# Patient Record
Sex: MALE | Race: WHITE | NOT HISPANIC OR LATINO | Employment: OTHER | URBAN - METROPOLITAN AREA
[De-identification: names, ages, dates, MRNs, and addresses within clinical notes are randomized per-mention and may not be internally consistent; named-entity substitution may affect disease eponyms.]

---

## 2018-08-30 ENCOUNTER — OFFICE VISIT (OUTPATIENT)
Dept: FAMILY MEDICINE CLINIC | Facility: CLINIC | Age: 83
End: 2018-08-30
Payer: MEDICARE

## 2018-08-30 VITALS
BODY MASS INDEX: 25.91 KG/M2 | DIASTOLIC BLOOD PRESSURE: 60 MMHG | SYSTOLIC BLOOD PRESSURE: 114 MMHG | WEIGHT: 171 LBS | HEART RATE: 68 BPM | HEIGHT: 68 IN | RESPIRATION RATE: 16 BRPM | TEMPERATURE: 96.2 F

## 2018-08-30 DIAGNOSIS — I25.10 CORONARY ARTERY DISEASE INVOLVING NATIVE CORONARY ARTERY OF NATIVE HEART WITHOUT ANGINA PECTORIS: ICD-10-CM

## 2018-08-30 DIAGNOSIS — I48.0 PAROXYSMAL ATRIAL FIBRILLATION (HCC): ICD-10-CM

## 2018-08-30 DIAGNOSIS — E11.9 TYPE 2 DIABETES MELLITUS WITH HEMOGLOBIN A1C GOAL OF LESS THAN 7.0% (HCC): Primary | ICD-10-CM

## 2018-08-30 DIAGNOSIS — I10 ESSENTIAL HYPERTENSION: ICD-10-CM

## 2018-08-30 DIAGNOSIS — R68.89 COLD INTOLERANCE: ICD-10-CM

## 2018-08-30 DIAGNOSIS — I44.2 CHB (COMPLETE HEART BLOCK) (HCC): ICD-10-CM

## 2018-08-30 DIAGNOSIS — M06.9 RHEUMATOID ARTHRITIS, INVOLVING UNSPECIFIED SITE, UNSPECIFIED RHEUMATOID FACTOR PRESENCE: ICD-10-CM

## 2018-08-30 DIAGNOSIS — Z95.2 S/P AVR (AORTIC VALVE REPLACEMENT): ICD-10-CM

## 2018-08-30 PROBLEM — Z87.891 FORMER SMOKER: Status: ACTIVE | Noted: 2017-02-26

## 2018-08-30 PROBLEM — Z95.0 PACEMAKER: Status: ACTIVE | Noted: 2018-08-30

## 2018-08-30 PROBLEM — M72.0 DUPUYTREN CONTRACTURE: Status: ACTIVE | Noted: 2018-04-10

## 2018-08-30 PROBLEM — H90.3 BILATERAL SENSORINEURAL HEARING LOSS: Status: ACTIVE | Noted: 2017-02-26

## 2018-08-30 PROCEDURE — 99204 OFFICE O/P NEW MOD 45 MIN: CPT | Performed by: NURSE PRACTITIONER

## 2018-08-30 RX ORDER — BLOOD-GLUCOSE METER
EACH MISCELLANEOUS
COMMUNITY
Start: 2018-08-27 | End: 2018-11-13 | Stop reason: SDUPTHER

## 2018-08-30 RX ORDER — FLUTICASONE PROPIONATE 50 MCG
1 SPRAY, SUSPENSION (ML) NASAL DAILY
COMMUNITY
End: 2019-04-29

## 2018-08-30 RX ORDER — BENAZEPRIL HYDROCHLORIDE 5 MG/1
TABLET, FILM COATED ORAL
COMMUNITY
Start: 2018-02-15 | End: 2018-11-13 | Stop reason: SDUPTHER

## 2018-08-30 RX ORDER — HYDROXYCHLOROQUINE SULFATE 200 MG/1
TABLET, FILM COATED ORAL
COMMUNITY
Start: 2018-04-10 | End: 2018-09-11

## 2018-08-30 RX ORDER — LANCETS 33 GAUGE
EACH MISCELLANEOUS
COMMUNITY
Start: 2018-08-27 | End: 2019-04-29

## 2018-08-30 RX ORDER — POTASSIUM CHLORIDE 750 MG/1
TABLET, EXTENDED RELEASE ORAL
COMMUNITY
Start: 2018-03-01 | End: 2018-11-13 | Stop reason: SDUPTHER

## 2018-08-30 RX ORDER — METOPROLOL SUCCINATE 50 MG/1
TABLET, EXTENDED RELEASE ORAL
COMMUNITY
Start: 2018-02-15 | End: 2018-11-13 | Stop reason: HOSPADM

## 2018-08-30 RX ORDER — WARFARIN SODIUM 2 MG/1
TABLET ORAL
COMMUNITY
Start: 2018-02-15 | End: 2018-11-27 | Stop reason: SDUPTHER

## 2018-08-30 RX ORDER — LANOLIN ALCOHOL/MO/W.PET/CERES
CREAM (GRAM) TOPICAL
COMMUNITY
Start: 2006-04-07 | End: 2018-09-11 | Stop reason: SDUPTHER

## 2018-08-30 RX ORDER — RANITIDINE 300 MG/1
300 TABLET ORAL
COMMUNITY
End: 2018-11-13 | Stop reason: SDUPTHER

## 2018-08-30 RX ORDER — AMLODIPINE BESYLATE 10 MG/1
TABLET ORAL
COMMUNITY
Start: 2018-02-15 | End: 2019-02-07 | Stop reason: SDUPTHER

## 2018-08-30 RX ORDER — ROSUVASTATIN CALCIUM 10 MG/1
TABLET, COATED ORAL
COMMUNITY
Start: 2018-02-15 | End: 2019-09-30

## 2018-08-30 RX ORDER — LANCING DEVICE/LANCETS
KIT MISCELLANEOUS
COMMUNITY
Start: 2018-08-27 | End: 2019-04-29

## 2018-08-30 RX ORDER — ASPIRIN 81 MG/1
TABLET ORAL
COMMUNITY
Start: 2005-02-24 | End: 2018-11-13 | Stop reason: SDUPTHER

## 2018-08-30 RX ORDER — GABAPENTIN 100 MG/1
CAPSULE ORAL
COMMUNITY
Start: 2018-02-15 | End: 2018-11-13 | Stop reason: SDUPTHER

## 2018-08-30 RX ORDER — ALBUTEROL SULFATE 90 UG/1
AEROSOL, METERED RESPIRATORY (INHALATION)
COMMUNITY
Start: 2015-12-18 | End: 2018-11-13 | Stop reason: SDUPTHER

## 2018-08-30 NOTE — PROGRESS NOTES
Assessment/Plan:    Order for labs given  Orders as below  He would like to continue care in [place] office for 3 month follow up  Repeat INR due on 9/12/18  Problem List Items Addressed This Visit        Endocrine    Type 2 diabetes mellitus with hemoglobin A1c goal of less than 7 0% (HCC) - Primary     No results found for: HGBA1C    No results for input(s): POCGLU in the last 72 hours  Blood Sugar Average: Last 72 hrs:    Diabetes diet controlled  Continue same         Relevant Orders    CBC and differential    Comprehensive metabolic panel    Hemoglobin A1C    Lipid panel       Cardiovascular and Mediastinum    CHB (complete heart block) ([facility] )     ICD in place  Has appropriate follow up arranged         Relevant Medications    metoprolol succinate (TOPROL-XL) 50 mg 24 hr tablet    warfarin (COUMADIN) 2 mg tablet    Other Relevant Orders    Ambulatory referral to Cardiology    Coronary artery disease involving native coronary artery of native heart without angina pectoris    Relevant Medications    amLODIPine (NORVASC) 10 mg tablet    metoprolol succinate (TOPROL-XL) 50 mg 24 hr tablet    Essential hypertension     Stable  Continue current medications         Relevant Medications    amLODIPine (NORVASC) 10 mg tablet    benazepril (LOTENSIN) 5 mg tablet    metoprolol succinate (TOPROL-XL) 50 mg 24 hr tablet    Paroxysmal atrial fibrillation (HCC)     On Coumadin    Referral given for local cardiologist if he decides he would like to see someone locally         Relevant Medications    amLODIPine (NORVASC) 10 mg tablet    metoprolol succinate (TOPROL-XL) 50 mg 24 hr tablet    Other Relevant Orders    Ambulatory referral to Cardiology    Protime-INR       Musculoskeletal and Integument    RA (rheumatoid arthritis) ([facility] )     Referred to rheumatology         Relevant Orders    Ambulatory referral to Rheumatology       Other    S/P AVR (aortic valve replacement)      Other Visit Diagnoses     Cold intolerance        Relevant Orders    TSH, 3rd generation with Free T4 reflex          Patient Instructions   Repeat Coumadin bloodwork on 9/12/19  Labs to be done in the beginning of November      Return in about 3 months (around 11/30/2018)  Subjective:      Patient ID: Pierce Hay is a 80 y o  male  Chief Complaint   Patient presents with    initial visit     follow up medication use-lj       New pt here today to establish care  Recently moved to the area earlier this year, prior care at [facility]  He  Plans to continue care with his cardiologist, but needs new PCP  Also requesting a referral for local rheumatologist   He has been having a flare  Was scheduled for a colonoscopy, which he cancelled  Referred for rectal bleeding  This has been rescheduled for December  Wife reports pt with complaints of cold intolerance  Always wearing extra blankets and clothing despite the heat          The following portions of the patient's history were reviewed and updated as appropriate: allergies, current medications, past family history, past medical history, past social history, past surgical history and problem list     Review of Systems   Constitutional: Negative for chills, fatigue and fever  Respiratory: Negative for cough, shortness of breath and wheezing  Cardiovascular: Negative for chest pain, palpitations and leg swelling  Gastrointestinal: Negative for abdominal pain, diarrhea, nausea and vomiting  Musculoskeletal: Positive for arthralgias and joint swelling  Skin: Negative for rash  Neurological: Negative for dizziness and headaches           Current Outpatient Prescriptions   Medication Sig Dispense Refill    albuterol (PROVENTIL HFA,VENTOLIN HFA) 90 mcg/act inhaler Inhale      amLODIPine (NORVASC) 10 mg tablet take 1/2  tablet daily      aspirin (ECOTRIN LOW STRENGTH) 81 mg EC tablet Take by mouth      benazepril (LOTENSIN) 5 mg tablet Take by mouth      Blood Glucose Monitoring Suppl (ONE TOUCH ULTRA 2) w/Device KIT Check sugar daily and as needed  Dx: E11 9      etanercept (ENBREL SURECLICK) 50 MG/ML injection Inject 50 mg as directed once a week   fluticasone (FLONASE) 50 mcg/act nasal spray 1 spray into each nostril daily      folic acid (FOLVITE) 064 mcg tablet Take by mouth      gabapentin (NEURONTIN) 100 mg capsule Take by mouth      glucose blood test strip Check sugar daily and as needed  Dx: E11 9      hydroxychloroquine (PLAQUENIL) 200 mg tablet Take by mouth      [employer] (ONE TOUCH DELICA LANCING DEV) MISC Check sugar daily and as needed  Dx: E11 9      metoprolol succinate (TOPROL-XL) 50 mg 24 hr tablet Take by mouth      ONETOUCH DELICA LANCETS 72H MISC Check sugar daily and as needed  Dx: E11 9      potassium chloride (K-DUR,KLOR-CON) 10 mEq tablet Take by mouth      ranitidine (ZANTAC) 300 MG tablet Take 300 mg by mouth daily at bedtime      rosuvastatin (CRESTOR) 10 MG tablet 1 by mouth daily      warfarin (COUMADIN) 2 mg tablet Take 2 mg on Mondays and Fridays and 4 mg all other days of the week or as directed       No current facility-administered medications for this visit  Objective:    /60   Pulse 68   Temp (!) 96 2 °F (35 7 °C)   Resp 16   Ht 5' 7 5" (1 715 m)   Wt 77 6 kg (171 lb)   BMI 26 39 kg/m²        Physical Exam   Constitutional: He appears well-developed and well-nourished  HENT:   Right Ear: Tympanic membrane, external ear and ear canal normal    Left Ear: Tympanic membrane, external ear and ear canal normal    Nose: No mucosal edema  Mouth/Throat: Oropharynx is clear and moist and mucous membranes are normal    Eyes: Conjunctivae are normal    Cardiovascular: Normal rate, regular rhythm and normal heart sounds  Pulmonary/Chest: Effort normal and breath sounds normal    Abdominal: Bowel sounds are normal  He exhibits no distension  There is no splenomegaly or hepatomegaly  There is no tenderness  Lymphadenopathy:        Right cervical: No superficial cervical adenopathy present  Left cervical: No superficial cervical adenopathy present  Skin: No rash noted  Psychiatric: He has a normal mood and affect  Nursing note and vitals reviewed           HEMOGLOBIN, A1C(%)  Kendra Dt/Tm Resulted Value Status  -----------------------------------------------  8/31/17 8:46A 8/31/17 6 4 FINAL  5/22/17 8:01A 5/22/17 6 2 FINAL  2/16/17 8:47A 2/16/17 6 2 FINAL    LDL (CALCULATED)(mg/dL)  Kendra Dt/Tm Resulted Value Status  -----------------------------------------------  3/14/18 10:07A 3/14/18 61 FINAL  Creatinine Results:    CREATININE(mg/dL)  Kendra Dt/Tm Resulted Value Status  -----------------------------------------------  7/16/18 2:19P 7/16/18 0 8 FINAL  4/10/18 11:43A 4/10/18 0 7 FINAL  3/14/18 10:07A 3/14/18 0 6 FINAL      GUSTAVO Sosa

## 2018-08-31 NOTE — ASSESSMENT & PLAN NOTE
No results found for: HGBA1C    No results for input(s): POCGLU in the last 72 hours  Blood Sugar Average: Last 72 hrs:    Diabetes diet controlled    Continue same

## 2018-08-31 NOTE — ASSESSMENT & PLAN NOTE
On Coumadin    Referral given for local cardiologist if he decides he would like to see someone locally

## 2018-09-11 ENCOUNTER — OFFICE VISIT (OUTPATIENT)
Dept: RHEUMATOLOGY | Facility: CLINIC | Age: 83
End: 2018-09-11
Payer: MEDICARE

## 2018-09-11 VITALS
HEART RATE: 69 BPM | WEIGHT: 167.6 LBS | BODY MASS INDEX: 25.4 KG/M2 | SYSTOLIC BLOOD PRESSURE: 119 MMHG | HEIGHT: 68 IN | DIASTOLIC BLOOD PRESSURE: 70 MMHG

## 2018-09-11 DIAGNOSIS — M06.9 RHEUMATOID ARTHRITIS, INVOLVING UNSPECIFIED SITE, UNSPECIFIED RHEUMATOID FACTOR PRESENCE: ICD-10-CM

## 2018-09-11 PROCEDURE — 99205 OFFICE O/P NEW HI 60 MIN: CPT | Performed by: INTERNAL MEDICINE

## 2018-09-11 RX ORDER — FOLIC ACID 1 MG/1
1 TABLET ORAL DAILY
Qty: 30 TABLET | Refills: 11 | Status: SHIPPED | OUTPATIENT
Start: 2018-09-11 | End: 2018-09-27

## 2018-09-11 RX ORDER — PREDNISONE 1 MG/1
5 TABLET ORAL DAILY
Qty: 30 TABLET | Refills: 2 | Status: SHIPPED | OUTPATIENT
Start: 2018-09-11 | End: 2018-11-13 | Stop reason: SDUPTHER

## 2018-09-11 RX ORDER — HYDROXYCHLOROQUINE SULFATE 200 MG/1
200 TABLET, FILM COATED ORAL 2 TIMES DAILY WITH MEALS
Qty: 60 TABLET | Refills: 5 | Status: SHIPPED | OUTPATIENT
Start: 2018-09-11 | End: 2018-11-13 | Stop reason: SDUPTHER

## 2018-09-11 NOTE — PATIENT INSTRUCTIONS
Please start taking Plaquenil 1 tablet twice daily  Methotrexate will be 4 tablets all at once, once WEEKLY  Please start prednisone 1 tablet daily

## 2018-09-11 NOTE — PROGRESS NOTES
Assessment and Plan:   Mr Sandoval Calvin is an 70-year-old male with history significant for seropositive rheumatoid arthritis (positive RF and CCP) who presents for initial evaluation and establishing with Rheumatology  # Seropositive RA, active, with evidence of chronic synovial hypertrophy and joint deformities  - Edy Carreno has evidence of active and chronic synovitis on his exam today, which leads me to believe he would still benefit from additional treatment and DMARD's  I do not believe his disease is in the "burnt-out" stage yet  - We discussed multiple treatment options, including Plaquenil, traditional DMARD's and biologics  Edy Carreno is concerned about side effects of these medications, which is a very valid point in view of his multiple co-morbidities and age  At this point of time, I would like to initiate treatment to aid with the arthritis and prevent further deformities, but also take in to consideration side effect profiles  - I will start him on Plaquenil 200 mg BID along with Methotrexate 4 tablets weekly and folic acid 1 mg daily  In addition he will also take prednisone 5 mg daily - I advised him to check his blood sugars periodically if possible  We did discuss the side effects of Methotrexate, including GI intolerance, risk of infections, bone marrow suppression, renal and hepatic toxicity  He will obtain CBC, CMP and chronic hepatitis panel today, and repeat at the next visit  We did also review the long term side effects of prednisone and the aim would be to taper off this as soon as possible  - He was also strongly advised to schedule an eye exam every 6 months for Plaquenil toxicity monitoring, and have results faxed to me  - I will see him back in the office in 2 months  Plan:  Diagnoses and all orders for this visit:    Rheumatoid arthritis, involving unspecified site, unspecified rheumatoid factor presence (HCC)  -     C-reactive protein;  Future  -     Sedimentation rate, automated; Future  -     RF Screen w/ Reflex to Titer; Future  -     Cyclic citrul peptide antibody, IgG; Future  -     hydroxychloroquine (PLAQUENIL) 200 mg tablet; Take 1 tablet (200 mg total) by mouth 2 (two) times a day with meals for 180 days  -     Chronic Hepatitis Panel; Future  -     methotrexate (RHEUMATREX) 2 5 MG tablet; Take 4 tablets (10 mg total) by mouth once a week  -     predniSONE 5 mg tablet; Take 1 tablet (5 mg total) by mouth daily  -     folic acid (FOLVITE) 1 mg tablet; Take 1 tablet (1 mg total) by mouth daily      Activities as tolerated    Fall precautions emphasized    Diet: low carb/low fat, more greens/vegetables, adequate hydration  Exercise: try to maintain a low impact exercise regimen as much as possible  Walk for 30 minutes a day for at least 3 days a week    Encouraged to maintain good sleep hygiene  Continue other medications as prescribed by PCP and other specialists        RTC in 2 months  HPI   Mr Cleve Campos is an 80-year-old male with history significant for seropositive rheumatoid arthritis (positive RF and CCP) who presents for initial evaluation and establishing with Rheumatology  Patient and his wife are present at today's visit and although they are both very coherent, there appears to be a slight lapse in recalling patients prior history  History today is obtained from patient, his wife, and also from review of his prior rheumatology records  He was diagnosed with seropositive rheumatoid arthritis about 15 years ago when he developed diffuse joint pains  He was first seen by Dr Viviana Lemon (private practice rheumatologist) and was started on Hydroxychloroquine 400 mg daily along with low dose steroids, and it appears he was maintained on that regimen till about 2016, when he discontinued the medications as his symptoms had resolved   He remained mostly asymptomatic until earlier this year when he again experienced diffuse joint pains and swelling, but predominantly in his hands, wrists, shoulders and knees  Per the notes from his most recent rheumatologist, Dr Rodney Coe from [facility], patient was restarted on Plaquenil and low dose prednisone, but patient is unaware of this and says he was not taking the medications  There was also conversation regarding step-up therapy to TNF-inhibitors, but unfortunately due to the cost Humira or Enbrel were never started  Overall, as per patient he is currently not on any medications for the rheumatoid arthritis  Currently, he states continued aching pain in his joints, mostly affecting his bilateral shoulders, left wrist, bilateral hands and left knee  He has swelling of his fingers, left wrist and knee as well  Morning stiffness lasts a few hours and slightly eases up but does not resolve completely  He denies fevers, sicca symptoms, mouth/nose ulcers, swollen glands, skin rash, abdominal pain, N/V/D or blood in stools  He does not get recurrent infections  A quantiferon checked in 7/2018 was negative  The following portions of the patient's history were reviewed and updated as appropriate: allergies, current medications, past family history, past medical history, past social history, past surgical history and problem list       Review of Systems  Constitutional: Negative for fevers, night sweats, fatigue  Positive for weight loss and chills  ENT/Mouth: Negative for ear pain, nasal congestion, sinus pain, hoarseness, sore throat, rhinorrhea, swallowing difficulty  Positive for loss of hearing  Eyes: Negative for pain, redness, discharge, vision changes  Cardiovascular: Negative for chest pain, palpitations  Positive for SOB  Respiratory: Negative for cough, sputum, wheezing  Gastrointestinal: Negative for nausea, vomiting, diarrhea, constipation, pain, heartburn  Genitourinary: Negative for dysuria, hematuria  Positive for urinary frequency  Musculoskeletal: As per HPI  Skin: Negative for skin rash, color changes     Neuro: Negative for weakness, numbness, tingling, loss of consciousness  Positive for headache  Psych: Negative for anxiety, depression  Heme/Lymph: Negative for easy bruising, bleeding, lymphadenopathy  Past Medical History:   Diagnosis Date    CAD (coronary artery disease)     Cardiomyopathy ([facility] )     Dupuytren contracture     Dyslipidemia     Essential hypertension     ICD (implantable cardioverter-defibrillator) in place     Lumbosacral radiculopathy at S1     Osteoarthritis     Rheumatoid arthritis (HCC)     SNHL (sensorineural hearing loss)     Spinal stenosis     Type 2 diabetes mellitus ([facility] )        Past Surgical History:   Procedure Laterality Date    AORTIC VALVE REPLACEMENT      CHOLECYSTECTOMY         Social History     Social History    Marital status: /Civil Union     Spouse name: N/A    Number of children: N/A    Years of education: N/A     Occupational History    Not on file       Social History Main Topics    Smoking status: Former Smoker     Quit date: 1995    Smokeless tobacco: Never Used    Alcohol use No    Drug use: No    Sexual activity: Not on file     Other Topics Concern    Not on file     Social History Narrative    No narrative on file       Family History   Problem Relation Age of Onset    Heart disease Brother     Cancer Brother     COPD Father     Diabetes Mother        No Known Allergies      Current Outpatient Prescriptions:     albuterol (PROVENTIL HFA,VENTOLIN HFA) 90 mcg/act inhaler, Inhale, Disp: , Rfl:     amLODIPine (NORVASC) 10 mg tablet, take 1/2  tablet daily, Disp: , Rfl:     aspirin (ECOTRIN LOW STRENGTH) 81 mg EC tablet, Take by mouth, Disp: , Rfl:     benazepril (LOTENSIN) 5 mg tablet, Take by mouth, Disp: , Rfl:     Blood Glucose Monitoring Suppl (ONE TOUCH ULTRA 2) w/Device KIT, Check sugar daily and as needed  Dx: E11 9, Disp: , Rfl:     etanercept (ENBREL SURECLICK) 50 MG/ML injection, Inject 50 mg as directed once a week , Disp: , Rfl:     fluticasone (FLONASE) 50 mcg/act nasal spray, 1 spray into each nostril daily, Disp: , Rfl:     folic acid (FOLVITE) 1 mg tablet, Take 1 tablet (1 mg total) by mouth daily, Disp: 30 tablet, Rfl: 11    gabapentin (NEURONTIN) 100 mg capsule, Take by mouth, Disp: , Rfl:     glucose blood test strip, Check sugar daily and as needed  Dx: E11 9, Disp: , Rfl:     Lancet Devices (ONE TOUCH DELICA LANCING DEV) MISC, Check sugar daily and as needed  Dx: E11 9, Disp: , Rfl:     metoprolol succinate (TOPROL-XL) 50 mg 24 hr tablet, Take by mouth, Disp: , Rfl:     ONETOUCH DELICA LANCETS 48B MISC, Check sugar daily and as needed  Dx: E11 9, Disp: , Rfl:     potassium chloride (K-DUR,KLOR-CON) 10 mEq tablet, Take by mouth, Disp: , Rfl:     ranitidine (ZANTAC) 300 MG tablet, Take 300 mg by mouth daily at bedtime, Disp: , Rfl:     rosuvastatin (CRESTOR) 10 MG tablet, 1 by mouth daily, Disp: , Rfl:     warfarin (COUMADIN) 2 mg tablet, Take 2 mg on Mondays and Fridays and 4 mg all other days of the week or as directed, Disp: , Rfl:     hydroxychloroquine (PLAQUENIL) 200 mg tablet, Take 1 tablet (200 mg total) by mouth 2 (two) times a day with meals for 180 days, Disp: 60 tablet, Rfl: 5    methotrexate (RHEUMATREX) 2 5 MG tablet, Take 4 tablets (10 mg total) by mouth once a week, Disp: 16 tablet, Rfl: 2    predniSONE 5 mg tablet, Take 1 tablet (5 mg total) by mouth daily, Disp: 30 tablet, Rfl: 2      Objective:    Vitals:    09/11/18 1154   BP: 119/70   BP Location: Left arm   Patient Position: Sitting   Cuff Size: Standard   Pulse: 69   Weight: 76 kg (167 lb 9 6 oz)   Height: 5' 7 5" (1 715 m)       Physical Exam  General: Well appearing, well nourished, in no distress  Oriented x 3, normal mood and affect   Ambulating with cane  Stooped posture  Skin: Good turgor, no rash, unusual bruising or prominent lesions  Hair: Normal texture and distribution    Nails: Normal color, no deformities  HEENT:  Head: Normocephalic, atraumatic  Eyes: Conjunctiva clear, sclera non-icteric, EOM intact, PERRL  Nose: No external lesions, mucosa non-inflamed  Mouth: Mucous membranes moist, no mucosal lesions  Neck: Supple, thyroid non-enlarged and non-tender  Lungs: Clear to auscultation, no crackles or wheezing  Abdomen: Soft, non-tender, non-distended, bowel sounds normal    Musculoskeletal:   Hands - He has tenderness at multiple PIP's bilaterally, with STS noted at all PIP's  OA changes present at DIP's  Right 5th digit has a fixed Boutenniere deformity  Slight ulnar deviation of right hand  MCP's are unremarkable  Wrists - Left wrist has STS with likely chronic synovial hypertrophy, painful ROM  Elbows - Likely chronic synovitis at bilateral elbows  Slight limitation in full extension of right elbow  Shoulders - Non-tender without any STS, but with limited active abduction till 90 degrees bilaterally  Able to perform increased ROM with passive motion  Hips - Unremarkable  Knees - Left knee with moderate effusion, non-tender, slight warmth, no erythema, good ROM, crepitus present  Right knee unremarkable  Ankles and feet - non-tender, mild pedal edema present  Negative MTP squeeze test    Neurologic: Alert and oriented  No focal neurological deficits appreciated  Psychiatric: Normal mood and affect  NAVYA Roche    Adult Rheumatology

## 2018-09-12 ENCOUNTER — APPOINTMENT (OUTPATIENT)
Dept: LAB | Facility: CLINIC | Age: 83
End: 2018-09-12
Payer: MEDICARE

## 2018-09-12 ENCOUNTER — TELEPHONE (OUTPATIENT)
Dept: FAMILY MEDICINE CLINIC | Facility: CLINIC | Age: 83
End: 2018-09-12

## 2018-09-12 ENCOUNTER — TRANSCRIBE ORDERS (OUTPATIENT)
Dept: ADMINISTRATIVE | Facility: HOSPITAL | Age: 83
End: 2018-09-12

## 2018-09-12 ENCOUNTER — ANTICOAG VISIT (OUTPATIENT)
Dept: FAMILY MEDICINE CLINIC | Facility: CLINIC | Age: 83
End: 2018-09-12

## 2018-09-12 DIAGNOSIS — M06.9 RHEUMATOID ARTHRITIS, INVOLVING UNSPECIFIED SITE, UNSPECIFIED RHEUMATOID FACTOR PRESENCE: ICD-10-CM

## 2018-09-12 DIAGNOSIS — E11.9 TYPE 2 DIABETES MELLITUS WITH HEMOGLOBIN A1C GOAL OF LESS THAN 7.0% (HCC): ICD-10-CM

## 2018-09-12 DIAGNOSIS — R68.89 COLD INTOLERANCE: ICD-10-CM

## 2018-09-12 DIAGNOSIS — I48.0 PAROXYSMAL ATRIAL FIBRILLATION (HCC): ICD-10-CM

## 2018-09-12 LAB
ALBUMIN SERPL BCP-MCNC: 2.9 G/DL (ref 3.5–5)
ALP SERPL-CCNC: 131 U/L (ref 46–116)
ALT SERPL W P-5'-P-CCNC: 15 U/L (ref 12–78)
ANION GAP SERPL CALCULATED.3IONS-SCNC: 8 MMOL/L (ref 4–13)
AST SERPL W P-5'-P-CCNC: 23 U/L (ref 5–45)
BASOPHILS # BLD AUTO: 0.09 THOUSANDS/ΜL (ref 0–0.1)
BASOPHILS NFR BLD AUTO: 1 % (ref 0–1)
BILIRUB SERPL-MCNC: 0.91 MG/DL (ref 0.2–1)
BUN SERPL-MCNC: 13 MG/DL (ref 5–25)
CALCIUM SERPL-MCNC: 8.9 MG/DL (ref 8.3–10.1)
CHLORIDE SERPL-SCNC: 103 MMOL/L (ref 100–108)
CHOLEST SERPL-MCNC: 104 MG/DL (ref 50–200)
CO2 SERPL-SCNC: 28 MMOL/L (ref 21–32)
CREAT SERPL-MCNC: 0.61 MG/DL (ref 0.6–1.3)
CRP SERPL QL: 31.6 MG/L
EOSINOPHIL # BLD AUTO: 0.15 THOUSAND/ΜL (ref 0–0.61)
EOSINOPHIL NFR BLD AUTO: 2 % (ref 0–6)
ERYTHROCYTE [DISTWIDTH] IN BLOOD BY AUTOMATED COUNT: 16.7 % (ref 11.6–15.1)
ERYTHROCYTE [SEDIMENTATION RATE] IN BLOOD: 46 MM/HOUR (ref 0–10)
EST. AVERAGE GLUCOSE BLD GHB EST-MCNC: 120 MG/DL
GFR SERPL CREATININE-BSD FRML MDRD: 92 ML/MIN/1.73SQ M
GLUCOSE P FAST SERPL-MCNC: 106 MG/DL (ref 65–99)
HBA1C MFR BLD: 5.8 % (ref 4.2–6.3)
HCT VFR BLD AUTO: 36.4 % (ref 36.5–49.3)
HDLC SERPL-MCNC: 37 MG/DL (ref 40–60)
HGB BLD-MCNC: 11.3 G/DL (ref 12–17)
IMM GRANULOCYTES # BLD AUTO: 0.02 THOUSAND/UL (ref 0–0.2)
IMM GRANULOCYTES NFR BLD AUTO: 0 % (ref 0–2)
INR PPP: 2.41 (ref 0.86–1.17)
LDLC SERPL CALC-MCNC: 48 MG/DL (ref 0–100)
LYMPHOCYTES # BLD AUTO: 1.94 THOUSANDS/ΜL (ref 0.6–4.47)
LYMPHOCYTES NFR BLD AUTO: 20 % (ref 14–44)
MCH RBC QN AUTO: 25.2 PG (ref 26.8–34.3)
MCHC RBC AUTO-ENTMCNC: 31 G/DL (ref 31.4–37.4)
MCV RBC AUTO: 81 FL (ref 82–98)
MONOCYTES # BLD AUTO: 0.98 THOUSAND/ΜL (ref 0.17–1.22)
MONOCYTES NFR BLD AUTO: 10 % (ref 4–12)
NEUTROPHILS # BLD AUTO: 6.41 THOUSANDS/ΜL (ref 1.85–7.62)
NEUTS SEG NFR BLD AUTO: 67 % (ref 43–75)
NONHDLC SERPL-MCNC: 67 MG/DL
NRBC BLD AUTO-RTO: 0 /100 WBCS
PLATELET # BLD AUTO: 349 THOUSANDS/UL (ref 149–390)
PMV BLD AUTO: 9.2 FL (ref 8.9–12.7)
POTASSIUM SERPL-SCNC: 4 MMOL/L (ref 3.5–5.3)
PROT SERPL-MCNC: 7.2 G/DL (ref 6.4–8.2)
PROTHROMBIN TIME: 26.3 SECONDS (ref 11.8–14.2)
RBC # BLD AUTO: 4.49 MILLION/UL (ref 3.88–5.62)
SODIUM SERPL-SCNC: 139 MMOL/L (ref 136–145)
T4 FREE SERPL-MCNC: 1.53 NG/DL (ref 0.76–1.46)
TRIGL SERPL-MCNC: 94 MG/DL
TSH SERPL DL<=0.05 MIU/L-ACNC: 0.35 UIU/ML (ref 0.36–3.74)
WBC # BLD AUTO: 9.59 THOUSAND/UL (ref 4.31–10.16)

## 2018-09-12 PROCEDURE — 85652 RBC SED RATE AUTOMATED: CPT

## 2018-09-12 PROCEDURE — 36415 COLL VENOUS BLD VENIPUNCTURE: CPT

## 2018-09-12 PROCEDURE — 80061 LIPID PANEL: CPT

## 2018-09-12 PROCEDURE — 86140 C-REACTIVE PROTEIN: CPT

## 2018-09-12 PROCEDURE — 86431 RHEUMATOID FACTOR QUANT: CPT

## 2018-09-12 PROCEDURE — 85610 PROTHROMBIN TIME: CPT

## 2018-09-12 PROCEDURE — 84443 ASSAY THYROID STIM HORMONE: CPT

## 2018-09-12 PROCEDURE — 86430 RHEUMATOID FACTOR TEST QUAL: CPT

## 2018-09-12 PROCEDURE — 84439 ASSAY OF FREE THYROXINE: CPT

## 2018-09-12 PROCEDURE — 86704 HEP B CORE ANTIBODY TOTAL: CPT

## 2018-09-12 PROCEDURE — 86200 CCP ANTIBODY: CPT

## 2018-09-12 PROCEDURE — 80053 COMPREHEN METABOLIC PANEL: CPT

## 2018-09-12 PROCEDURE — 87340 HEPATITIS B SURFACE AG IA: CPT

## 2018-09-12 PROCEDURE — 83036 HEMOGLOBIN GLYCOSYLATED A1C: CPT

## 2018-09-12 PROCEDURE — 85025 COMPLETE CBC W/AUTO DIFF WBC: CPT

## 2018-09-12 PROCEDURE — 86705 HEP B CORE ANTIBODY IGM: CPT

## 2018-09-12 PROCEDURE — 86803 HEPATITIS C AB TEST: CPT

## 2018-09-12 NOTE — TELEPHONE ENCOUNTER
----- Message from Rachelle Wright sent at 9/12/2018  2:12 PM EDT -----      ----- Message -----  From: Lab, Background User  Sent: 9/12/2018   2:04 PM  To: Lars Gardner

## 2018-09-12 NOTE — TELEPHONE ENCOUNTER
Spoke with patients wife, created new calender  Per wife they just moved here and need someone to manage this  Reviewed current dosage with wife and gave new instructions same dosage and redraw in one month  Patients wife verbalized understanding       Set goal range for calender 2-3

## 2018-09-12 NOTE — TELEPHONE ENCOUNTER
Pt was seen by Chipper Lennox, I am unsure if we are namaging the INR if so we need to make a flow sheet  The INR is theraputic however so would be same dose redraw in a month   only if we are managing though

## 2018-09-13 LAB
CRYOGLOB RF SER-ACNC: ABNORMAL [IU]/ML
HBV CORE AB SER QL: NORMAL
HBV CORE IGM SER QL: NORMAL
HBV SURFACE AG SER QL: NORMAL
HCV AB SER QL: NORMAL
RHEUMATOID FACT SER QL LA: POSITIVE

## 2018-09-14 ENCOUNTER — TELEPHONE (OUTPATIENT)
Dept: OBGYN CLINIC | Facility: CLINIC | Age: 83
End: 2018-09-14

## 2018-09-14 LAB — CCP IGA+IGG SERPL IA-ACNC: >250 UNITS (ref 0–19)

## 2018-09-14 NOTE — TELEPHONE ENCOUNTER
Called pt and advised  Verbalized understanding and has a follow up with his PCP when he returns from vacation  I advised him to call the PCP office and let them know that he had labs done and his thyroid levels were slightly abnormal so it could be addressed at the appointment      ----- Message from Nacho Modi MD sent at 9/14/2018 12:40 PM EDT -----  Please let patient know his labs showed inflammation which is likely due to the uncontrolled arthritis  His thyroid levels were slightly abnormal and he should call his PCP regarding this  Thank you

## 2018-09-19 NOTE — TELEPHONE ENCOUNTER
I saw that Arron Tavera and his wife were scheduled for f/u with me next week  They were planning on transferring to the [facility] practice once it opened  Please clarify this with pt    Anthony Medina

## 2018-09-19 NOTE — TELEPHONE ENCOUNTER
Please disregard  Will see pt for lab f/u and schedule him to be seen in [place] for further f/u    Ranjana Pederson

## 2018-09-27 ENCOUNTER — OFFICE VISIT (OUTPATIENT)
Dept: FAMILY MEDICINE CLINIC | Facility: CLINIC | Age: 83
End: 2018-09-27
Payer: MEDICARE

## 2018-09-27 VITALS
SYSTOLIC BLOOD PRESSURE: 120 MMHG | WEIGHT: 163 LBS | TEMPERATURE: 96.8 F | HEART RATE: 100 BPM | BODY MASS INDEX: 24.71 KG/M2 | DIASTOLIC BLOOD PRESSURE: 78 MMHG | RESPIRATION RATE: 18 BRPM | HEIGHT: 68 IN

## 2018-09-27 DIAGNOSIS — Z23 NEED FOR IMMUNIZATION AGAINST INFLUENZA: ICD-10-CM

## 2018-09-27 DIAGNOSIS — M06.9 RHEUMATOID ARTHRITIS, INVOLVING UNSPECIFIED SITE, UNSPECIFIED RHEUMATOID FACTOR PRESENCE: ICD-10-CM

## 2018-09-27 DIAGNOSIS — I48.0 PAROXYSMAL ATRIAL FIBRILLATION (HCC): ICD-10-CM

## 2018-09-27 DIAGNOSIS — E05.90 HYPERTHYROIDISM: Primary | ICD-10-CM

## 2018-09-27 DIAGNOSIS — I10 ESSENTIAL HYPERTENSION: ICD-10-CM

## 2018-09-27 PROCEDURE — 90662 IIV NO PRSV INCREASED AG IM: CPT

## 2018-09-27 PROCEDURE — 99214 OFFICE O/P EST MOD 30 MIN: CPT | Performed by: NURSE PRACTITIONER

## 2018-09-27 PROCEDURE — G0008 ADMIN INFLUENZA VIRUS VAC: HCPCS

## 2018-09-27 RX ORDER — FOLIC ACID 1 MG/1
TABLET ORAL
COMMUNITY
End: 2018-11-13 | Stop reason: SDUPTHER

## 2018-09-27 RX ORDER — METHOTREXATE 2.5 MG/1
TABLET ORAL
Refills: 0 | COMMUNITY
Start: 2018-09-11 | End: 2018-11-13 | Stop reason: SDUPTHER

## 2018-09-27 NOTE — PATIENT INSTRUCTIONS
Take 2mg of Coumadin on Mondays and Friday, 4mg all other days  Recheck your PT/INR at the lab on 10/10/18  Please schedule the ultrasound of your thyroid  We will have you scheduled to see an endocrinologist in November  Please call the [facility] office to schedule your 1 month follow up appointment

## 2018-09-27 NOTE — ASSESSMENT & PLAN NOTE
Stable  Follows with his cardiologist at [facility]    Instructions given for his Coumadin

## 2018-09-27 NOTE — PROGRESS NOTES
Assessment/Plan:    He was instructed to call the office if he has any questions about what he should be taking or any concerns about his care  Problem List Items Addressed This Visit        Endocrine    Hyperthyroidism - Primary     Thyroid US ordered  He will need to see endocrinology for further workup  He would prefer to wait until  [facility] endocrinology comes to the [place] area within the next couple of months  Relevant Medications    PredniSONE 5 MG TBEC    Other Relevant Orders    US thyroid       Cardiovascular and Mediastinum    Essential hypertension     Stable  Continue current medications         Paroxysmal atrial fibrillation (HCC)     Stable  Follows with his cardiologist at [facility]  Instructions given for his Coumadin             Musculoskeletal and Integument    RA (rheumatoid arthritis) ([facility] [address] )     Follows with rheumatology           Other Visit Diagnoses     Need for immunization against influenza        Relevant Orders    influenza vaccine, [id number], high-dose, PF 0 5 mL, for patients 65 yr+ (FLUZONE HIGH-DOSE) (Completed)          Patient Instructions   Take 2mg of Coumadin on Mondays and Friday, 4mg all other days  Recheck your PT/INR at the lab on 10/10/18  Please schedule the ultrasound of your thyroid  We will have you scheduled to see an endocrinologist in November  Please call the [place] office to schedule your 1 month follow up appointment  Return if symptoms worsen or fail to improve  Subjective:      Patient ID: Rashel Park is a 80 y o  male  Chief Complaint   Patient presents with    medication questions     PT INR questions akrma        He has not been taking any of his medications because he was confused about what he should be taking  He saw his rheumatologist and has been feeling much better since his meds were adjusted  He has not been taking his Coumadin      Labs done prior to visit today  No other complaints or concerns  The following portions of the patient's history were reviewed and updated as appropriate: allergies, current medications, past family history, past medical history, past social history, past surgical history and problem list     Review of Systems   Constitutional: Negative for chills, fatigue and fever  Respiratory: Negative for cough, shortness of breath and wheezing  Cardiovascular: Negative for chest pain, palpitations and leg swelling  Gastrointestinal: Negative for abdominal pain, diarrhea, nausea and vomiting  Musculoskeletal:        See HPI   Skin: Negative for rash  Neurological: Negative for dizziness and headaches  Current Outpatient Prescriptions   Medication Sig Dispense Refill    albuterol (PROVENTIL HFA,VENTOLIN HFA) 90 mcg/act inhaler Inhale      amLODIPine (NORVASC) 10 mg tablet take 1/2  tablet daily      aspirin (ECOTRIN LOW STRENGTH) 81 mg EC tablet Take by mouth      benazepril (LOTENSIN) 5 mg tablet Take by mouth      Blood Glucose Monitoring Suppl (ONE TOUCH ULTRA 2) w/Device KIT Check sugar daily and as needed  Dx: E11 9      etanercept (ENBREL SURECLICK) 50 MG/ML injection Inject 50 mg as directed once a week        folic acid (FOLVITE) 1 mg tablet Take by mouth      gabapentin (NEURONTIN) 100 mg capsule Take by mouth      glucose blood test strip Check sugar daily and as needed  Dx: E11 9      hydroxychloroquine (PLAQUENIL) 200 mg tablet Take 1 tablet (200 mg total) by mouth 2 (two) times a day with meals for 180 days 60 tablet 5    Lancet Devices (ONE TOUCH DELICA LANCING DEV) MISC Check sugar daily and as needed  Dx: E11 9      methotrexate (RHEUMATREX) 2 5 MG tablet Take by mouth      metoprolol succinate (TOPROL-XL) 50 mg 24 hr tablet Take by mouth      ONETOUCH DELICA LANCETS 06O MISC Check sugar daily and as needed  Dx: E11 9      potassium chloride (K-DUR,KLOR-CON) 10 mEq tablet Take by mouth      predniSONE 5 mg tablet Take 1 tablet (5 mg total) by mouth daily 30 tablet 2    rosuvastatin (CRESTOR) 10 MG tablet 1 by mouth daily      warfarin (COUMADIN) 2 mg tablet Take 2 mg on Mondays and Fridays and 4 mg all other days of the week or as directed      fluticasone (FLONASE) 50 mcg/act nasal spray 1 spray into each nostril daily      methotrexate (RHEUMATREX) 2 5 MG tablet Take 4 tablets (10 mg total) by mouth once a week (Patient not taking: Reported on 9/27/2018 ) 16 tablet 2    methotrexate 2 5 MG tablet TAKE 4 TABLETS BY MOUTH ONCE A WEEK  0    PredniSONE 5 MG TBEC Take by mouth      ranitidine (ZANTAC) 300 MG tablet Take 300 mg by mouth daily at bedtime       No current facility-administered medications for this visit  Objective:    /78   Pulse 100   Temp (!) 96 8 °F (36 °C)   Resp 18   Ht 5' 7 5" (1 715 m)   Wt 73 9 kg (163 lb)   BMI 25 15 kg/m²        Physical Exam   Constitutional: He appears well-developed and well-nourished  HENT:   Head: Normocephalic and atraumatic  Right Ear: Tympanic membrane, external ear and ear canal normal    Left Ear: Tympanic membrane, external ear and ear canal normal    Nose: No mucosal edema or rhinorrhea  Mouth/Throat: Uvula is midline, oropharynx is clear and moist and mucous membranes are normal    Eyes: Conjunctivae are normal    Neck: Neck supple  No edema present  No thyromegaly present  Cardiovascular: Normal rate, regular rhythm, normal heart sounds and intact distal pulses  No murmur heard  Pulmonary/Chest: Effort normal and breath sounds normal    Abdominal: Bowel sounds are normal  He exhibits no distension  There is no splenomegaly or hepatomegaly  There is no tenderness  Lymphadenopathy:        Right cervical: No superficial cervical adenopathy present  Left cervical: No superficial cervical adenopathy present  Skin: Skin is warm, dry and intact  No rash noted  Psychiatric: He has a normal mood and affect  Nursing note and vitals reviewed  Morgan Berg

## 2018-09-27 NOTE — ASSESSMENT & PLAN NOTE
Thyroid US ordered  He will need to see endocrinology for further workup  He would prefer to wait until  [facility] comes to the [place] area within the next couple of months

## 2018-10-10 ENCOUNTER — TELEPHONE (OUTPATIENT)
Dept: FAMILY MEDICINE CLINIC | Facility: CLINIC | Age: 83
End: 2018-10-10

## 2018-10-10 ENCOUNTER — TRANSCRIBE ORDERS (OUTPATIENT)
Dept: ADMINISTRATIVE | Facility: HOSPITAL | Age: 83
End: 2018-10-10

## 2018-10-10 DIAGNOSIS — I78.0 HEREDITARY HEMORRHAGIC TELANGIECTASIA (HCC): Primary | ICD-10-CM

## 2018-10-10 DIAGNOSIS — I48.0 PAROXYSMAL ATRIAL FIBRILLATION (HCC): ICD-10-CM

## 2018-10-10 DIAGNOSIS — R79.89 ABNORMAL TSH: Primary | ICD-10-CM

## 2018-10-10 NOTE — TELEPHONE ENCOUNTER
He went with his wife for BW this morning and he couldn't get his done because they didn't have an order in the system for him    He needs his Coumadin level tested and his Thyroid please create order and they will have it in the system when he goes, please call to let know when done

## 2018-10-11 PROBLEM — R79.89 ABNORMAL TSH: Status: ACTIVE | Noted: 2018-09-27

## 2018-10-12 ENCOUNTER — ANTICOAG VISIT (OUTPATIENT)
Dept: FAMILY MEDICINE CLINIC | Facility: CLINIC | Age: 83
End: 2018-10-12

## 2018-10-12 ENCOUNTER — APPOINTMENT (OUTPATIENT)
Dept: LAB | Facility: CLINIC | Age: 83
End: 2018-10-12
Payer: MEDICARE

## 2018-10-12 DIAGNOSIS — I48.0 PAROXYSMAL ATRIAL FIBRILLATION (HCC): ICD-10-CM

## 2018-10-12 DIAGNOSIS — R79.89 ABNORMAL TSH: ICD-10-CM

## 2018-10-12 LAB
INR PPP: 1.85 (ref 0.86–1.17)
PROTHROMBIN TIME: 21.4 SECONDS (ref 11.8–14.2)
T4 FREE SERPL-MCNC: 1.18 NG/DL (ref 0.76–1.46)
TSH SERPL DL<=0.05 MIU/L-ACNC: 0.82 UIU/ML (ref 0.36–3.74)

## 2018-10-12 PROCEDURE — 85610 PROTHROMBIN TIME: CPT

## 2018-10-12 PROCEDURE — 36415 COLL VENOUS BLD VENIPUNCTURE: CPT

## 2018-10-12 PROCEDURE — 84439 ASSAY OF FREE THYROXINE: CPT

## 2018-10-12 PROCEDURE — 84443 ASSAY THYROID STIM HORMONE: CPT

## 2018-10-19 ENCOUNTER — TELEPHONE (OUTPATIENT)
Dept: FAMILY MEDICINE CLINIC | Facility: CLINIC | Age: 83
End: 2018-10-19

## 2018-10-19 NOTE — TELEPHONE ENCOUNTER
Spoke with Wife , who stated that she was concerned that Pt was taking too much Warfarin , confirmed with wife that Pt was on correct amount of Warfarin but Pt did not get his INR lab today   Wife stated they  will both go for INR on Monday Oct 22, 2018  [facility]

## 2018-10-19 NOTE — TELEPHONE ENCOUNTER
Mrs Debbie Lopez left a message on the test results hotline  She is looking for his INR results and states he had his INR done in [place] "last Friday" and he "never heard back from anyone" She said every time he has his INR done they have a problem getting his results  She would like a call back   Geri Torres LPN

## 2018-10-22 NOTE — TELEPHONE ENCOUNTER
I spoke w/ pt's son, states his parents have been issues with their memory  He was there this weekend to go over their medications  I expressed my concerns about his parents not knowing when they are due for labs or how they should be taking their medication  He did say we should call him directly with any instructions regarding their Coumadin  He will call them to arrange for their PT/INR to be done today or tomorrow    Tam Warren

## 2018-10-22 NOTE — TELEPHONE ENCOUNTER
I called pt today, and his wife answered  She states she did not know they were supposed to go for their INRs today  She has it on the calendar for next Saturday  States both her and her  woke up and have bad backs and can hardly move  She states they will go tomorrow for labs  She does not recall speaking with our office staff last week regarding this  I got the phone number for pt's son and left a message for him to call back to discuss my concerns    Aniyah Luke

## 2018-10-23 ENCOUNTER — ANTICOAG VISIT (OUTPATIENT)
Dept: FAMILY MEDICINE CLINIC | Facility: CLINIC | Age: 83
End: 2018-10-23

## 2018-10-23 ENCOUNTER — APPOINTMENT (OUTPATIENT)
Dept: LAB | Facility: CLINIC | Age: 83
End: 2018-10-23
Payer: MEDICARE

## 2018-10-23 DIAGNOSIS — I48.0 PAROXYSMAL ATRIAL FIBRILLATION (HCC): ICD-10-CM

## 2018-10-23 LAB
INR PPP: 2.28 (ref 0.86–1.17)
PROTHROMBIN TIME: 25.2 SECONDS (ref 11.8–14.2)

## 2018-10-23 PROCEDURE — 85610 PROTHROMBIN TIME: CPT

## 2018-10-23 PROCEDURE — 36415 COLL VENOUS BLD VENIPUNCTURE: CPT

## 2018-11-01 ENCOUNTER — TELEPHONE (OUTPATIENT)
Dept: FAMILY MEDICINE CLINIC | Facility: CLINIC | Age: 83
End: 2018-11-01

## 2018-11-01 NOTE — TELEPHONE ENCOUNTER
I spoke w/ Maikol Soriano  She verified that Hortencia German did not have her labs done this week  She plans to go tomorrow morning  I expressed my concerns about their inability to manage their medications or appointment correctly  She would like to be called with INR results and will accompany pt to future appts  She will call back to reschedule his November follow up   Oumar Lackey

## 2018-11-01 NOTE — TELEPHONE ENCOUNTER
L/M for pt's daughter in law to call back  Lester Jarquin and his wife did not go for their INR's again  I am concerned about their memory  They need someone in the home to help their manage their medications and appointments  Ari Hedge

## 2018-11-02 ENCOUNTER — TELEPHONE (OUTPATIENT)
Dept: FAMILY MEDICINE CLINIC | Facility: CLINIC | Age: 83
End: 2018-11-02

## 2018-11-02 ENCOUNTER — ANTICOAG VISIT (OUTPATIENT)
Dept: FAMILY MEDICINE CLINIC | Facility: CLINIC | Age: 83
End: 2018-11-02

## 2018-11-02 ENCOUNTER — APPOINTMENT (OUTPATIENT)
Dept: LAB | Facility: CLINIC | Age: 83
End: 2018-11-02
Payer: MEDICARE

## 2018-11-02 DIAGNOSIS — I48.0 PAROXYSMAL ATRIAL FIBRILLATION (HCC): ICD-10-CM

## 2018-11-02 DIAGNOSIS — I78.0 HEREDITARY HEMORRHAGIC TELANGIECTASIA (HCC): ICD-10-CM

## 2018-11-02 LAB
INR PPP: 2.12 (ref 0.86–1.17)
PROTHROMBIN TIME: 23.8 SECONDS (ref 11.8–14.2)

## 2018-11-02 PROCEDURE — 85610 PROTHROMBIN TIME: CPT

## 2018-11-02 PROCEDURE — 36415 COLL VENOUS BLD VENIPUNCTURE: CPT

## 2018-11-02 NOTE — TELEPHONE ENCOUNTER
Reviewed  I spoke with Keyonna Michele is as well, she is aware of instructions and will follow up with both of her in [facility]    Chacha RUSTdonald

## 2018-11-02 NOTE — TELEPHONE ENCOUNTER
Called patients son Christi Soler, gave son patients dosage schedule and to recheck his INR and 1 month  (Wrote down instructions) He is aware of his wife Alyssa Bolanos trying to manage this, informed him our office tried calling her number we have is correct currently giving her issues  Son will visit patient and verify dosage        Christi Soler (Son) [phone]

## 2018-11-06 ENCOUNTER — TELEPHONE (OUTPATIENT)
Dept: FAMILY MEDICINE CLINIC | Facility: CLINIC | Age: 83
End: 2018-11-06

## 2018-11-06 NOTE — TELEPHONE ENCOUNTER
Spoke with patient states he woke up this morning and his left knee is "making a awful about of noise " Has bothered him for awhile  Does not recall any injuries  Unable to bend it without causing pain  Wife requesting an Xray   Sending to [place] to review

## 2018-11-06 NOTE — TELEPHONE ENCOUNTER
Nicolas Viera   Patients wife called and said her husbands knee is swollen  She wants an xray order  Please call back to advise

## 2018-11-06 NOTE — TELEPHONE ENCOUNTER
It sounds like he is having a rheumatoid arthritis flare  He should call Dr Jorge Black office  He may need additional steroids    Shonna Harris

## 2018-11-06 NOTE — TELEPHONE ENCOUNTER
Spoke with patient and wife Nory Sims, previous message given  Per wife patient has appointment with Dr Saab  on 11/13 next week so they will keep appointment     No further actions required at this time  Ana Luisa Khan LPN

## 2018-11-13 ENCOUNTER — APPOINTMENT (OUTPATIENT)
Dept: LAB | Facility: CLINIC | Age: 83
End: 2018-11-13
Payer: MEDICARE

## 2018-11-13 ENCOUNTER — APPOINTMENT (OUTPATIENT)
Dept: RADIOLOGY | Facility: CLINIC | Age: 83
End: 2018-11-13
Payer: MEDICARE

## 2018-11-13 ENCOUNTER — OFFICE VISIT (OUTPATIENT)
Dept: RHEUMATOLOGY | Facility: CLINIC | Age: 83
End: 2018-11-13
Payer: MEDICARE

## 2018-11-13 VITALS
HEIGHT: 68 IN | BODY MASS INDEX: 25.85 KG/M2 | SYSTOLIC BLOOD PRESSURE: 149 MMHG | HEART RATE: 61 BPM | WEIGHT: 170.6 LBS | DIASTOLIC BLOOD PRESSURE: 77 MMHG

## 2018-11-13 DIAGNOSIS — Z79.899 HIGH RISK MEDICATION USE: ICD-10-CM

## 2018-11-13 DIAGNOSIS — M05.9 SEROPOSITIVE RHEUMATOID ARTHRITIS (HCC): ICD-10-CM

## 2018-11-13 DIAGNOSIS — Z79.52 CURRENT CHRONIC USE OF SYSTEMIC STEROIDS: ICD-10-CM

## 2018-11-13 DIAGNOSIS — M05.9 SEROPOSITIVE RHEUMATOID ARTHRITIS (HCC): Primary | ICD-10-CM

## 2018-11-13 LAB
BASOPHILS # BLD AUTO: 0.08 THOUSANDS/ΜL (ref 0–0.1)
BASOPHILS NFR BLD AUTO: 1 % (ref 0–1)
EOSINOPHIL # BLD AUTO: 0.11 THOUSAND/ΜL (ref 0–0.61)
EOSINOPHIL NFR BLD AUTO: 1 % (ref 0–6)
ERYTHROCYTE [DISTWIDTH] IN BLOOD BY AUTOMATED COUNT: 16.8 % (ref 11.6–15.1)
ERYTHROCYTE [SEDIMENTATION RATE] IN BLOOD: 16 MM/HOUR (ref 0–10)
HCT VFR BLD AUTO: 40.6 % (ref 36.5–49.3)
HGB BLD-MCNC: 12.3 G/DL (ref 12–17)
IMM GRANULOCYTES # BLD AUTO: 0.03 THOUSAND/UL (ref 0–0.2)
IMM GRANULOCYTES NFR BLD AUTO: 0 % (ref 0–2)
LYMPHOCYTES # BLD AUTO: 1.54 THOUSANDS/ΜL (ref 0.6–4.47)
LYMPHOCYTES NFR BLD AUTO: 16 % (ref 14–44)
MCH RBC QN AUTO: 25.8 PG (ref 26.8–34.3)
MCHC RBC AUTO-ENTMCNC: 30.3 G/DL (ref 31.4–37.4)
MCV RBC AUTO: 85 FL (ref 82–98)
MONOCYTES # BLD AUTO: 0.57 THOUSAND/ΜL (ref 0.17–1.22)
MONOCYTES NFR BLD AUTO: 6 % (ref 4–12)
NEUTROPHILS # BLD AUTO: 7.23 THOUSANDS/ΜL (ref 1.85–7.62)
NEUTS SEG NFR BLD AUTO: 76 % (ref 43–75)
NRBC BLD AUTO-RTO: 0 /100 WBCS
PLATELET # BLD AUTO: 271 THOUSANDS/UL (ref 149–390)
PMV BLD AUTO: 9.7 FL (ref 8.9–12.7)
RBC # BLD AUTO: 4.77 MILLION/UL (ref 3.88–5.62)
WBC # BLD AUTO: 9.56 THOUSAND/UL (ref 4.31–10.16)

## 2018-11-13 PROCEDURE — 99214 OFFICE O/P EST MOD 30 MIN: CPT | Performed by: INTERNAL MEDICINE

## 2018-11-13 PROCEDURE — 80053 COMPREHEN METABOLIC PANEL: CPT

## 2018-11-13 PROCEDURE — 36415 COLL VENOUS BLD VENIPUNCTURE: CPT

## 2018-11-13 PROCEDURE — 71046 X-RAY EXAM CHEST 2 VIEWS: CPT

## 2018-11-13 PROCEDURE — 85652 RBC SED RATE AUTOMATED: CPT

## 2018-11-13 PROCEDURE — 86140 C-REACTIVE PROTEIN: CPT

## 2018-11-13 PROCEDURE — 85025 COMPLETE CBC W/AUTO DIFF WBC: CPT

## 2018-11-13 RX ORDER — METOPROLOL SUCCINATE 50 MG/1
50 TABLET, EXTENDED RELEASE ORAL
COMMUNITY
Start: 2018-02-15 | End: 2019-02-07 | Stop reason: SDUPTHER

## 2018-11-13 RX ORDER — POTASSIUM CHLORIDE 750 MG/1
10 TABLET, EXTENDED RELEASE ORAL
COMMUNITY
Start: 2018-03-01 | End: 2019-09-30

## 2018-11-13 RX ORDER — HYDROXYCHLOROQUINE SULFATE 200 MG/1
400 TABLET, FILM COATED ORAL
COMMUNITY
Start: 2018-04-10 | End: 2018-11-13 | Stop reason: SDUPTHER

## 2018-11-13 RX ORDER — HYDROXYCHLOROQUINE SULFATE 200 MG/1
200 TABLET, FILM COATED ORAL 2 TIMES DAILY WITH MEALS
Qty: 60 TABLET | Refills: 5 | Status: SHIPPED | OUTPATIENT
Start: 2018-11-13 | End: 2019-05-07 | Stop reason: SDUPTHER

## 2018-11-13 RX ORDER — BENAZEPRIL HYDROCHLORIDE 5 MG/1
5 TABLET, FILM COATED ORAL
COMMUNITY
Start: 2018-02-15 | End: 2019-09-30

## 2018-11-13 RX ORDER — ALBUTEROL SULFATE 90 UG/1
2 AEROSOL, METERED RESPIRATORY (INHALATION)
COMMUNITY
Start: 2015-12-18 | End: 2019-04-29

## 2018-11-13 RX ORDER — ROSUVASTATIN CALCIUM 10 MG/1
TABLET, COATED ORAL
COMMUNITY
Start: 2018-02-15 | End: 2018-11-13 | Stop reason: HOSPADM

## 2018-11-13 RX ORDER — GABAPENTIN 100 MG/1
100 CAPSULE ORAL
COMMUNITY
Start: 2018-02-15 | End: 2019-02-07 | Stop reason: SDUPTHER

## 2018-11-13 RX ORDER — FOLIC ACID 1 MG/1
1 TABLET ORAL
COMMUNITY
End: 2019-03-12

## 2018-11-13 NOTE — PROGRESS NOTES
Assessment and Plan:   Mr Rhoda Bowman is an 51-year-old  male with history significant for seropositive rheumatoid arthritis (positive rheumatoid factor and CCP antibody) who presents for follow-up  He is currently on methotrexate 4 tablets weekly with folic acid 1 mg daily, hydroxychloroquine 200 mg b i d  and prednisone 5 mg daily  # Seropositive rheumatoid arthritis, currently in clinical remission, with evidence of chronic synovial hypertrophy and joint deformities  - Mai Gonzalez is seen today in follow-up and with the current medication regimen he is on has had significant improvement in his overall arthritis, without significant evidence of synovitis today  He is very pleased with his improvement  I advised him we will continue the same regimen with methotrexate 4 tablets weekly with folic acid 1 mg daily, hydroxychloroquine 200 mg b i d  and prednisone 5 mg daily  - I will obtain high risk medication lab monitoring today  As he is also on methotrexate and has a history of a chronic cough I will obtain a chest x-ray to further evaluate  - In view of the hydroxychloroquine he is aware to get at least annual eye exams done  - As he has previously not had a bone density done and he is currently on chronic steroids with a history of rheumatoid arthritis, I will order the study today  - I will plan to see him back in the office in 3 months, but advised him to call me he has any concerns or flare-up in symptoms  # Chronic steroid use  - He is aware of the side effects with chronic steroid use including risk of infections, osteoporosis, avascular necrosis, diabetes, hypertension, cataracts/glaucoma and GI intolerance  He is currently on a very low dose as maintenance for the rheumatoid arthritis, and at future visits if he continues to remain asymptomatic I can potentially plan to taper off this        Plan:  Diagnoses and all orders for this visit:    Seropositive rheumatoid arthritis ([facility] [address] )  - Comprehensive metabolic panel; Future  -     CBC and differential; Future  -     C-reactive protein; Future  -     Sedimentation rate, automated; Future  -     PredniSONE 5 MG TBEC; Take 1 tablet (5 mg total) by mouth daily  -     hydroxychloroquine (PLAQUENIL) 200 mg tablet; Take 1 tablet (200 mg total) by mouth 2 (two) times a day with meals for 30 days  -     methotrexate (RHEUMATREX) 2 5 MG tablet; Take 4 tablets (10 mg total) by mouth once a week    High risk medication use  -     Comprehensive metabolic panel; Future  -     CBC and differential; Future  -     C-reactive protein; Future  -     Sedimentation rate, automated; Future  -     XR chest pa & lateral; Future    Activities as tolerated    Diet: low carb/low fat, more greens/vegetables, adequate hydration  Exercise: try to maintain a low impact exercise regimen as much as possible  Walk for 30 minutes a day for at least 3 days a week    Encouraged to maintain good sleep hygiene  Continue other medications as prescribed by PCP and other specialists        RTC in 3 months  HPI    INITIAL VISIT NOTE:  Mr Debbie Lopez is an 80-year-old male with history significant for seropositive rheumatoid arthritis (positive RF and CCP) who presents for initial evaluation and establishing with Rheumatology       Patient and his wife are present at today's visit and although they are both very coherent, there appears to be a slight lapse in recalling patients prior history  History today is obtained from patient, his wife, and also from review of his prior rheumatology records      He was diagnosed with seropositive rheumatoid arthritis about 15 years ago when he developed diffuse joint pains  He was first seen by Dr Ramiro Contreras (private practice rheumatologist) and was started on Hydroxychloroquine 400 mg daily along with low dose steroids, and it appears he was maintained on that regimen till about 2016, when he discontinued the medications as his symptoms had resolved  He remained mostly asymptomatic until earlier this year when he again experienced diffuse joint pains and swelling, but predominantly in his hands, wrists, shoulders and knees  Per the notes from his most recent rheumatologist, Dr Efrain Zurita from [facility], patient was restarted on Plaquenil and low dose prednisone, but patient is unaware of this and says he was not taking the medications  There was also conversation regarding step-up therapy to TNF-inhibitors, but unfortunately due to the cost Humira or Enbrel were never started  Overall, as per patient he is currently not on any medications for the rheumatoid arthritis      Currently, he states continued aching pain in his joints, mostly affecting his bilateral shoulders, left wrist, bilateral hands and left knee  He has swelling of his fingers, left wrist and knee as well  Morning stiffness lasts a few hours and slightly eases up but does not resolve completely  He denies fevers, sicca symptoms, mouth/nose ulcers, swollen glands, skin rash, abdominal pain, N/V/D or blood in stools  He does not get recurrent infections  A quantiferon checked in 7/2018 was negative  11/13/2018:  Patient is seen for follow-up today in view of seropositive rheumatoid arthritis with chronic deformities  He is currently on methotrexate 4 tablets weekly with folic acid 1 mg daily, hydroxychloroquine 200 mg b i d  and prednisone 5 mg daily  This combination of medications was started following his initial visit with me on 09/11, and he states there has been a significant improvement in his overall joint pains since starting this regimen  He noticed the improvement in the first few days, and states he only experiences occasional pain in his left hand 3rd and 4th digits, but otherwise denies any joint pains at this time  A few days ago he did have a twisting injury of his left knee which resulted in an occasional pain, but that is also improving    He does have chronic numbness down his left lower extremity  He denies any acutely swollen joints and states morning stiffness has also improved  He is otherwise tolerating the medications well without any noticeable side effects such as fevers, chills, recurrent infections, mouth or nose ulcers or GI upset  Of note he does give a history of a chronic dry cough for the past 1 year which is associated with a runny nose, and this has previously been attributed to allergies  He has not had any recent chest x-rays done  He has chronic shortness of breath on exertion which is unchanged  No chest pain  No other complaints at this time  The following portions of the patient's history were reviewed and updated as appropriate: allergies, current medications, past family history, past medical history, past social history, past surgical history and problem list       Review of Systems  Constitutional: Negative for fevers, chills, night sweats, fatigue  Positive for weight gain  ENT/Mouth: Negative for hearing changes, ear pain, nasal congestion, sinus pain, hoarseness, sore throat, rhinorrhea, swallowing difficulty  Eyes: Negative for pain, redness, discharge, vision changes  Cardiovascular: Negative for chest pain, palpitations  Respiratory: Negative for cough, sputum, wheezing  Positive for shortness of breath  Gastrointestinal: Negative for nausea, vomiting, diarrhea, constipation, pain, heartburn  Genitourinary: Negative for dysuria, hematuria  Positive for urinary frequency  Musculoskeletal: As per HPI  Skin: Negative for skin rash, color changes  Neuro: Negative for weakness, tingling, loss of consciousness  Positive for numbness  Psych: Negative for anxiety, depression  Heme/Lymph: Negative for easy bruising, bleeding, lymphadenopathy          Past Medical History:   Diagnosis Date    CAD (coronary artery disease)     Cardiomyopathy ([facility] [address] )     Dupuytren contracture     Dyslipidemia     Essential hypertension     ICD (implantable cardioverter-defibrillator) in place     Lumbosacral radiculopathy at S1     Osteoarthritis     Rheumatoid arthritis ([facility] [address]     SNHL (sensorineural hearing loss)     Spinal stenosis     Type 2 diabetes mellitus ([facility] [address] )        Past Surgical History:   Procedure Laterality Date    AORTIC VALVE REPLACEMENT      CHOLECYSTECTOMY         Social History     Social History    Marital status: /Civil Union     Spouse name: N/A    Number of children: N/A    Years of education: N/A     Occupational History    Not on file       Social History Main Topics    Smoking status: Former Smoker     Quit date: 1995    Smokeless tobacco: Never Used    Alcohol use No    Drug use: No    Sexual activity: Not on file     Other Topics Concern    Not on file     Social History Narrative    No narrative on file       Family History   Problem Relation Age of Onset    Heart disease Brother     Cancer Brother     COPD Father     Diabetes Mother        No Known Allergies      Current Outpatient Prescriptions:     albuterol (PROVENTIL HFA,VENTOLIN HFA) 90 mcg/act inhaler, Inhale 2 puffs, Disp: , Rfl:     amLODIPine (NORVASC) 10 mg tablet, take 1/2  tablet daily, Disp: , Rfl:     benazepril (LOTENSIN) 5 mg tablet, Take 5 mg by mouth, Disp: , Rfl:     fluticasone (FLONASE) 50 mcg/act nasal spray, 1 spray into each nostril daily, Disp: , Rfl:     folic acid (FOLVITE) 1 mg tablet, Take 1 mg by mouth, Disp: , Rfl:     gabapentin (NEURONTIN) 100 mg capsule, Take 100 mg by mouth, Disp: , Rfl:     glucose blood (ONE TOUCH ULTRA TEST) test strip, Check sugar daily and as needed  Dx: E11 9, Disp: , Rfl:     glucose blood test strip, Check sugar daily and as needed  Dx: E11 9, Disp: , Rfl:     hydroxychloroquine (PLAQUENIL) 200 mg tablet, Take 1 tablet (200 mg total) by mouth 2 (two) times a day with meals for 30 days, Disp: 60 tablet, Rfl: 5    Lancet Devices (ONE TOUCH DELICA LANCING DEV) MISC, Check sugar daily and as needed  Dx: E11 9, Disp: , Rfl:     methotrexate (RHEUMATREX) 2 5 MG tablet, Take 4 tablets (10 mg total) by mouth once a week, Disp: 16 tablet, Rfl: 2    metoprolol succinate (TOPROL XL) 50 mg 24 hr tablet, Take 50 mg by mouth, Disp: , Rfl:     ONETOUCH DELICA LANCETS 10B MISC, Check sugar daily and as needed  Dx: E11 9, Disp: , Rfl:     potassium chloride (K-DUR,KLOR-CON) 10 mEq tablet, Take 10 mEq by mouth, Disp: , Rfl:     PredniSONE 5 MG TBEC, Take 1 tablet (5 mg total) by mouth daily, Disp: 30 tablet, Rfl: 5    rosuvastatin (CRESTOR) 10 MG tablet, 1 by mouth daily, Disp: , Rfl:     warfarin (COUMADIN) 2 mg tablet, Take 2 mg on Mondays and Fridays and 4 mg all other days of the week or as directed, Disp: , Rfl:       Objective:    Vitals:    11/13/18 1116   BP: 149/77   BP Location: Left arm   Patient Position: Sitting   Cuff Size: Adult   Pulse: 61   Weight: 77 4 kg (170 lb 9 6 oz)   Height: 5' 8" (1 727 m)       Physical Exam  General: Well appearing, well nourished, in no distress  Oriented x 3, normal mood and affect  Ambulating without difficulty  Stooped posture  Skin: Good turgor, no rash, unusual bruising or prominent lesions  Hair: Normal texture and distribution  Nails: Normal color, no deformities  HEENT:  Head: Normocephalic, atraumatic  Eyes: Conjunctiva clear, sclera non-icteric, EOM intact  Nose: No external lesions, mucosa non-inflamed  Mouth: Mucous membranes moist, no mucosal lesions  Neck: Supple  No lymphadenopathy  Heart: Regular rate and rhythm, no murmur or gallop  Lungs: Clear to auscultation, no crackles or wheezing  Abdomen: Soft, non-tender, non-distended, bowel sounds normal    Extremities: No amputations or deformities, cyanosis, edema  Musculoskeletal:   Hands - there is significant improvement noted in soft tissue swelling at his bilateral PIPs  He possibly has mild synovitis, but he is not significantly tender at his joints    He does have osteoarthritic changes present at his DIPs bilaterally  Right 5th digit has a fixed boutonniere deformity  Slight ulnar deviation of his right hand  MCPs are unremarkable  Wrists - no soft tissue swelling or tenderness appreciated  Elbows - chronic synovial hypertrophy noted more significantly at his right elbow  He has slight limitation in full extension of right elbow  Elbows are nontender  Shoulders - nontender without any soft tissue swelling, but would limited active abduction till 90° bilaterally  Able to perform increased range of motion with passive motion  Hips - unremarkable  Knees - they are nontender without soft tissue swelling  Crepitus present bilaterally  Ankles and feet - nontender with negative MTP squeeze test bilaterally  Neurologic: Alert and oriented  No focal neurological deficits appreciated  Psychiatric: Normal mood and affect  NAVYA Barrios    Rheumatology

## 2018-11-14 ENCOUNTER — TELEPHONE (OUTPATIENT)
Dept: OBGYN CLINIC | Facility: CLINIC | Age: 83
End: 2018-11-14

## 2018-11-14 LAB
ALBUMIN SERPL BCP-MCNC: 3.5 G/DL (ref 3.5–5)
ALP SERPL-CCNC: 115 U/L (ref 46–116)
ALT SERPL W P-5'-P-CCNC: 19 U/L (ref 12–78)
ANION GAP SERPL CALCULATED.3IONS-SCNC: 5 MMOL/L (ref 4–13)
AST SERPL W P-5'-P-CCNC: 24 U/L (ref 5–45)
BILIRUB SERPL-MCNC: 0.66 MG/DL (ref 0.2–1)
BUN SERPL-MCNC: 8 MG/DL (ref 5–25)
CALCIUM SERPL-MCNC: 8.5 MG/DL (ref 8.3–10.1)
CHLORIDE SERPL-SCNC: 102 MMOL/L (ref 100–108)
CO2 SERPL-SCNC: 29 MMOL/L (ref 21–32)
CREAT SERPL-MCNC: 0.61 MG/DL (ref 0.6–1.3)
CRP SERPL QL: 4.6 MG/L
GFR SERPL CREATININE-BSD FRML MDRD: 92 ML/MIN/1.73SQ M
GLUCOSE SERPL-MCNC: 92 MG/DL (ref 65–140)
POTASSIUM SERPL-SCNC: 3.9 MMOL/L (ref 3.5–5.3)
PROT SERPL-MCNC: 7.4 G/DL (ref 6.4–8.2)
SODIUM SERPL-SCNC: 136 MMOL/L (ref 136–145)

## 2018-11-14 NOTE — TELEPHONE ENCOUNTER
----- Message from Daryl Chand MD sent at 11/14/2018  7:46 AM EST -----  Please let him know his labs are normal and the inflammation markers have significantly reduced as a result of treatment  Thanks

## 2018-11-15 ENCOUNTER — OFFICE VISIT (OUTPATIENT)
Dept: PODIATRY | Facility: CLINIC | Age: 83
End: 2018-11-15
Payer: MEDICARE

## 2018-11-15 VITALS — BODY MASS INDEX: 25.76 KG/M2 | WEIGHT: 170 LBS | HEIGHT: 68 IN

## 2018-11-15 DIAGNOSIS — M21.969 ACQUIRED DEFORMITY OF FOOT, UNSPECIFIED LATERALITY: Primary | ICD-10-CM

## 2018-11-15 DIAGNOSIS — B35.1 ONYCHOMYCOSIS: ICD-10-CM

## 2018-11-15 DIAGNOSIS — E11.42 DIABETIC POLYNEUROPATHY ASSOCIATED WITH TYPE 2 DIABETES MELLITUS (HCC): ICD-10-CM

## 2018-11-15 DIAGNOSIS — I70.209 PERIPHERAL ARTERIOSCLEROSIS (HCC): ICD-10-CM

## 2018-11-15 DIAGNOSIS — M79.672 PAIN IN BOTH FEET: ICD-10-CM

## 2018-11-15 DIAGNOSIS — M79.671 PAIN IN BOTH FEET: ICD-10-CM

## 2018-11-15 PROCEDURE — 99203 OFFICE O/P NEW LOW 30 MIN: CPT | Performed by: PODIATRIST

## 2018-11-15 NOTE — PROGRESS NOTES
Assessment/Plan:  Pain  Paronychia  Peripheral artery disease  Mild diabetic neuropathy  Mycosis of nail  Plan  Diabetic foot exam performed  Patient educated on conditions  All nails debrided without pain or complication  No problem-specific Assessment & Plan notes found for this encounter  There are no diagnoses linked to this encounter  Subjective:  Patient complains of pain in his feet with ambulation  No history of trauma  Patient is diabetic      Past Medical History:   Diagnosis Date    CAD (coronary artery disease)     Cardiomy[facility] [address]     Dupuytren contracture     Dyslipidemia     Essential hypertension     ICD (implantable cardioverter-defibrillator) in place     Lumbosacral radiculopathy at S1     Osteoarthritis     Rheumatoid arthritis (HCC)     SNHL (sensorineural hearing loss)     Spinal stenosis     Type 2 diabetes mellitus (HCC)        Past Surgical History:   Procedure Laterality Date    AORTIC VALVE REPLACEMENT      CHOLECYSTECTOMY         No Known Allergies      Current Outpatient Prescriptions:     albuterol (PROVENTIL HFA,VENTOLIN HFA) 90 mcg/act inhaler, Inhale 2 puffs, Disp: , Rfl:     amLODIPine (NORVASC) 10 mg tablet, take 1/2  tablet daily, Disp: , Rfl:     benazepril (LOTENSIN) 5 mg tablet, Take 5 mg by mouth, Disp: , Rfl:     fluticasone (FLONASE) 50 mcg/act nasal spray, 1 spray into each nostril daily, Disp: , Rfl:     folic acid (FOLVITE) 1 mg tablet, Take 1 mg by mouth, Disp: , Rfl:     gabapentin (NEURONTIN) 100 mg capsule, Take 100 mg by mouth, Disp: , Rfl:     glucose blood (ONE TOUCH ULTRA TEST) test strip, Check sugar daily and as needed  Dx: E11 9, Disp: , Rfl:     glucose blood test strip, Check sugar daily and as needed  Dx: E11 9, Disp: , Rfl:     hydroxychloroquine (PLAQUENIL) 200 mg tablet, Take 1 tablet (200 mg total) by mouth 2 (two) times a day with meals for 30 days, Disp: 60 tablet, Rfl: 5    Lancet Devices (ONE TOUCH DELICA LANCING DEV) MISC, Check sugar daily and as needed  Dx: E11 9, Disp: , Rfl:     methotrexate (RHEUMATREX) 2 5 MG tablet, Take 4 tablets (10 mg total) by mouth once a week, Disp: 16 tablet, Rfl: 2    metoprolol succinate (TOPROL XL) 50 mg 24 hr tablet, Take 50 mg by mouth, Disp: , Rfl:     ONETOUCH DELICA LANCETS 52M MISC, Check sugar daily and as needed  Dx: E11 9, Disp: , Rfl:     potassium chloride (K-DUR,KLOR-CON) 10 mEq tablet, Take 10 mEq by mouth, Disp: , Rfl:     PredniSONE 5 MG TBEC, Take 1 tablet (5 mg total) by mouth daily, Disp: 30 tablet, Rfl: 5    rosuvastatin (CRESTOR) 10 MG tablet, 1 by mouth daily, Disp: , Rfl:     warfarin (COUMADIN) 2 mg tablet, Take 2 mg on Mondays and Fridays and 4 mg all other days of the week or as directed, Disp: , Rfl:     Patient Active Problem List   Diagnosis    Patient refuses to disclose advance directive information    Bilateral sensorineural hearing loss    Biventricular ICD (implantable cardioverter-defibrillator) in place    Cardiomy[facility] [address] [facility]    CHB [facility]complete heart block) ([facility] [address] [facility]    Coronary artery disease involving native coronary artery of native heart without angina pectoris    Dupuytren contracture    Dyslipidemia, goal LDL below 70    Hyperplasia of prostate with lower urinary tract symptoms (LUTS)    Former smoker    Essential hypertension    Lymphedema of left leg    Osteoarthritis of multiple joints    Pacemaker    Paroxysmal atrial fibrillation (HCC)    RA [facility]rheumatoid arthritis) ([facility] [address] [facility]    Restrictive lung disease    RBBB    S/P AVR (aortic valve replacement)    S/P primary angioplasty with coronary stent    Spinal stenosis of lumbar region without neurogenic claudication    Type 2 diabetes mellitus with hemoglobin A1c goal of less than 7 0% (HCC)    Abnormal TSH          Patient ID: Renea Francois is a 80 y o  male      HPI    The following portions of the patient's history were reviewed and updated as appropriate: allergies, current medications, past family history, past medical history, past social history, past surgical history and problem list     Review of Systems      Objective:  Patient's shoes and socks removed  Foot Exam    General  General Appearance: appears stated age and healthy   Orientation: alert and oriented to person, place, and time   Affect: appropriate   Gait: antalgic       Right Foot/Ankle     Inspection and Palpation  Ecchymosis: none  Tenderness: metatarsals   Swelling: metatarsals   Arch: pes planus  Hammertoes: fifth toe  Claw Toes: absent  Hallux valgus: no  Hallux limitus: yes  Skin Exam: dry skin; Left Foot/Ankle      Inspection and Palpation  Ecchymosis: none  Tenderness: metatarsals   Swelling: metatarsals   Arch: pes planus  Hammertoes: fifth toe  Claw toes: absent  Hallux valgus: no  Hallux limitus: yes  Skin Exam: dry skin; Physical Exam   Constitutional: He appears well-developed and well-nourished  Cardiovascular: Normal rate and regular rhythm  QA 9 findings bilateral   Negative digital hair noted  Positive abnormal cooling   Feet:   Right Foot:   Skin Integrity: Positive for dry skin  Left Foot:   Skin Integrity: Positive for dry skin  Skin:   Severe mycotic toenail noted bilateral   Nails are thickened with debris  Positive malodor   Nursing note and vitals reviewed    Right Foot/Ankle   Right Foot Inspection  Skin Exam: dry skin                                Right Toe  - Comprehensive Exam  Ecchymosis: none  Arch: pes planus  Hammertoes: fifth toe  Claw Toes: absent  Hallux valgus: no  Hallux limitus: yes  Swelling: metatarsals   Tenderness: metatarsals         Left Foot/Ankle  Left Foot Inspection  Skin Exam: dry skin                                             Left Toe  - Comprehensive Exam  Ecchymosis: none  Arch: pes planus  Hammertoes: fifth toe  Claw toes: absent  Hallux valgus: no  Hallux limitus: yes  Swelling: metatarsals   Tenderness: metatarsals

## 2018-11-15 NOTE — PROGRESS NOTES
Assessment/Plan:    No problem-specific Assessment & Plan notes found for this encounter  There are no diagnoses linked to this encounter        Subjective:     Past Medical History:   Diagnosis Date    CAD (coronary artery disease)     Cardiomyopathy ([facility] [address]     Dupuytren contracture     Dyslipidemia     Essential hypertension     ICD (implantable cardioverter-defibrillator) in place     Lumbosacral radiculopathy at S1     Osteoarthritis     Rheumatoid arthritis (HCC)     SNHL (sensorineural hearing loss)     Spinal stenosis     [facility]        Past Surgical History:   Procedure Laterality Date    AORTIC VALVE REPLACEMENT      CHOLECYSTECTOMY         No Known Allergies      Current Outpatient Prescriptions:     albuterol (PROVENTIL HFA,VENTOLIN HFA) 90 mcg/act inhaler, Inhale 2 puffs, Disp: , Rfl:     amLODIPine (NORVASC) 10 mg tablet, take 1/2  tablet daily, Disp: , Rfl:     benazepril (LOTENSIN) 5 mg tablet, Take 5 mg by mouth, Disp: , Rfl:     fluticasone (FLONASE) 50 mcg/act nasal spray, 1 spray into each nostril daily, Disp: , Rfl:     folic acid (FOLVITE) 1 mg tablet, Take 1 mg by mouth, Disp: , Rfl:     gabapentin (NEURONTIN) 100 mg capsule, Take 100 mg by mouth, Disp: , Rfl:     glucose blood (ONE TOUCH ULTRA TEST) test strip, Check sugar daily and as needed  Dx: E11 9, Disp: , Rfl:     glucose blood test strip, Check sugar daily and as needed  Dx: E11 9, Disp: , Rfl:     hydroxychloroquine (PLAQUENIL) 200 mg tablet, Take 1 tablet (200 mg total) by mouth 2 (two) times a day with meals for 30 days, Disp: 60 tablet, Rfl: 5    Lancet Devices (ONE TOUCH DELICA LANCING DEV) MISC, Check sugar daily and as needed  Dx: E11 9, Disp: , Rfl:     methotrexate (RHEUMATREX) 2 5 MG tablet, Take 4 tablets (10 mg total) by mouth once a week, Disp: 16 tablet, Rfl: 2    metoprolol succinate (TOPROL XL) 50 mg 24 hr tablet, Take 50 mg by mouth, Disp: , Rfl:     ONETOUCH DELICA LANCETS 33G MISC, Check sugar daily and as needed  Dx: E11 9, Disp: , Rfl:     potassium chloride (K-DUR,KLOR-CON) 10 mEq tablet, Take 10 mEq by mouth, Disp: , Rfl:     PredniSONE 5 MG TBEC, Take 1 tablet (5 mg total) by mouth daily, Disp: 30 tablet, Rfl: 5    rosuvastatin (CRESTOR) 10 MG tablet, 1 by mouth daily, Disp: , Rfl:     warfarin (COUMADIN) 2 mg tablet, Take 2 mg on Mondays and Fridays and 4 mg all other days of the week or as directed, Disp: , Rfl:     Patient Active Problem List   Diagnosis    Patient refuses to disclose advance directive information    Bilateral sensorineural hearing loss    Biventricular ICD (implantable cardioverter-defibrillator) in place    [email or web address]    CHB (complete heart block) ([email or web address]    Coronary artery disease involving native coronary artery of native heart without angina pectoris    Dupuytren contracture    Dyslipidemia, goal LDL below 70    Hyperplasia of prostate with lower urinary tract symptoms (LUTS)    Former smoker    Essential hypertension    Lymphedema of left leg    Osteoarthritis of multiple joints    Pacemaker    Paroxysmal atrial fibrillation (HCC)    RA (rheumatoid arthritis) ([email or web address]    Restrictive lung disease    RBBB    S/P AVR (aortic valve replacement)    S/P primary angioplasty with coronary stent    Spinal stenosis of lumbar region without neurogenic claudication    Type 2 diabetes mellitus with hemoglobin A1c goal of less than 7 0% (HCC)    Abnormal TSH          Patient ID: Reyna Ayala is a 80 y o  male  HPI    The following portions of the patient's history were reviewed and updated as appropriate: allergies, current medications, past family history, past medical history, past social history, past surgical history and problem list     Review of Systems      Objective:  Patient's shoes and socks removed     Foot Exam    Right Foot/Ankle     Inspection and Palpation  Skin Exam: dry skin;     Neurovascular  Dorsalis pedis: 1+  Posterior tibial: 1+      Left Foot/Ankle      Inspection and Palpation  Skin Exam: dry skin;     Neurovascular  Dorsalis pedis: 1+  Posterior tibial: 1+        Physical Exam   Cardiovascular: Pulses are weak pulses  Pulses:       Dorsalis pedis pulses are 1+ on the right side, and 1+ on the left side  Posterior tibial pulses are 1+ on the right side, and 1+ on the left side  Feet:   Right Foot:   Skin Integrity: Positive for warmth and dry skin  Left Foot:   Skin Integrity: Positive for warmth and dry skin  Patient's shoes and socks removed  Right Foot/Ankle   Right Foot Inspection  Skin Exam: dry skin and warmth                          Toe Exam: swelling and erythema  Sensory   Vibration: diminished  Proprioception: diminished   Monofilament testing: intact  Vascular  Capillary refills: elevated  The right DP pulse is 1+  The right PT pulse is 1+  Left Foot/Ankle  Left Foot Inspection  Skin Exam: dry skin and warmth                                         Sensory   Vibration: diminished  Proprioception: diminished  Monofilament: intact  Vascular  Capillary refills: elevated  The left DP pulse is 1+  The left PT pulse is 1+  Assign Risk Category:  Deformity present;  Loss of protective sensation; Weak pulses       Risk: 2

## 2018-11-19 NOTE — TELEPHONE ENCOUNTER
Spoke with patient and informed him about his lab results  Patient was not able to fully understand and I spoke with his wife who provided gratitude and understanding

## 2018-11-20 ENCOUNTER — TELEPHONE (OUTPATIENT)
Dept: OBGYN CLINIC | Facility: CLINIC | Age: 83
End: 2018-11-20

## 2018-11-20 NOTE — TELEPHONE ENCOUNTER
Spoke with Patient and in formed that his chest XR were normal   He showed appreciation and understandings  He Also mention about blood work needing to be done but the orders are not in   Dose he need labs to be done?

## 2018-11-20 NOTE — TELEPHONE ENCOUNTER
----- Message from Christopher Del Toro MD sent at 11/15/2018  9:56 PM EST -----  Please let patient know his chest x ray was normal  Thanks

## 2018-11-21 NOTE — TELEPHONE ENCOUNTER
Spoke with Patient and made him aware that at this time there's is no additional lab work needed  Patient shared understandings

## 2018-11-27 ENCOUNTER — TELEPHONE (OUTPATIENT)
Dept: FAMILY MEDICINE CLINIC | Facility: CLINIC | Age: 83
End: 2018-11-27

## 2018-11-27 DIAGNOSIS — I48.0 PAROXYSMAL ATRIAL FIBRILLATION (HCC): Primary | ICD-10-CM

## 2018-11-27 RX ORDER — WARFARIN SODIUM 2 MG/1
TABLET ORAL
Qty: 90 TABLET | Refills: 1 | Status: SHIPPED | OUTPATIENT
Start: 2018-11-27

## 2018-11-27 NOTE — TELEPHONE ENCOUNTER
I spoke w/ Irene Rosa  He has been out of Coumadin for the past 2 weeks  States he called the office for a refill and no one returned his phone call  I sent over a refill and instructed him to have INR drawn in 2 weeks   Victor Hugo Leonard

## 2018-11-27 NOTE — TELEPHONE ENCOUNTER
Mrs Jer Christianson called stating that pt has been out of warfarin for two weeks and does not understand how to get medication or when to go for labs     Koko Bush MA

## 2018-11-29 ENCOUNTER — TELEPHONE (OUTPATIENT)
Dept: RHEUMATOLOGY | Facility: CLINIC | Age: 83
End: 2018-11-29

## 2018-11-29 ENCOUNTER — TRANSCRIBE ORDERS (OUTPATIENT)
Dept: ADMINISTRATIVE | Facility: HOSPITAL | Age: 83
End: 2018-11-29

## 2018-11-29 ENCOUNTER — HOSPITAL ENCOUNTER (OUTPATIENT)
Dept: RADIOLOGY | Facility: HOSPITAL | Age: 83
Discharge: HOME | End: 2018-11-29
Attending: INTERNAL MEDICINE
Payer: MEDICARE

## 2018-11-29 DIAGNOSIS — Z79.52 CURRENT CHRONIC USE OF SYSTEMIC STEROIDS: ICD-10-CM

## 2018-11-29 DIAGNOSIS — M05.9 SEROPOSITIVE RHEUMATOID ARTHRITIS (HCC): ICD-10-CM

## 2018-11-29 PROCEDURE — 77080 DXA BONE DENSITY AXIAL: CPT

## 2018-11-29 NOTE — TELEPHONE ENCOUNTER
----- Message from Sobia Wiley MD sent at 11/29/2018  1:30 PM EST -----  Please let patient know his bone density shows osteoporosis and I would like him to schedule a follow up to discuss this  Thank you

## 2018-12-04 ENCOUNTER — TRANSCRIBE ORDERS (OUTPATIENT)
Dept: ADMINISTRATIVE | Facility: HOSPITAL | Age: 83
End: 2018-12-04

## 2018-12-11 ENCOUNTER — OFFICE VISIT (OUTPATIENT)
Dept: RHEUMATOLOGY | Facility: CLINIC | Age: 83
End: 2018-12-11
Payer: MEDICARE

## 2018-12-11 ENCOUNTER — TELEPHONE (OUTPATIENT)
Dept: FAMILY MEDICINE CLINIC | Facility: CLINIC | Age: 83
End: 2018-12-11

## 2018-12-11 ENCOUNTER — APPOINTMENT (OUTPATIENT)
Dept: LAB | Facility: CLINIC | Age: 83
End: 2018-12-11
Payer: MEDICARE

## 2018-12-11 VITALS
SYSTOLIC BLOOD PRESSURE: 134 MMHG | WEIGHT: 170 LBS | HEIGHT: 67 IN | BODY MASS INDEX: 26.68 KG/M2 | DIASTOLIC BLOOD PRESSURE: 75 MMHG | HEART RATE: 54 BPM

## 2018-12-11 DIAGNOSIS — M81.0 AGE-RELATED OSTEOPOROSIS WITHOUT CURRENT PATHOLOGICAL FRACTURE: ICD-10-CM

## 2018-12-11 DIAGNOSIS — M81.0 AGE-RELATED OSTEOPOROSIS WITHOUT CURRENT PATHOLOGICAL FRACTURE: Primary | ICD-10-CM

## 2018-12-11 DIAGNOSIS — M05.79 RHEUMATOID ARTHRITIS INVOLVING MULTIPLE SITES WITH POSITIVE RHEUMATOID FACTOR (HCC): ICD-10-CM

## 2018-12-11 DIAGNOSIS — Z79.52 CURRENT USE OF STEROID MEDICATION: ICD-10-CM

## 2018-12-11 DIAGNOSIS — Z79.899 HIGH RISK MEDICATION USE: ICD-10-CM

## 2018-12-11 LAB
25(OH)D3 SERPL-MCNC: 19.7 NG/ML (ref 30–100)
MAGNESIUM SERPL-MCNC: 2.2 MG/DL (ref 1.6–2.6)
PHOSPHATE SERPL-MCNC: 3 MG/DL (ref 2.3–4.1)

## 2018-12-11 PROCEDURE — 99214 OFFICE O/P EST MOD 30 MIN: CPT | Performed by: INTERNAL MEDICINE

## 2018-12-11 PROCEDURE — 36415 COLL VENOUS BLD VENIPUNCTURE: CPT

## 2018-12-11 PROCEDURE — 82306 VITAMIN D 25 HYDROXY: CPT

## 2018-12-11 PROCEDURE — 83735 ASSAY OF MAGNESIUM: CPT

## 2018-12-11 PROCEDURE — 84100 ASSAY OF PHOSPHORUS: CPT

## 2018-12-11 RX ORDER — B-COMPLEX WITH VITAMIN C
1 TABLET ORAL 2 TIMES DAILY WITH MEALS
Qty: 60 TABLET | Refills: 11 | Status: SHIPPED | OUTPATIENT
Start: 2018-12-11

## 2018-12-11 NOTE — PROGRESS NOTES
Assessment and Plan:   Mr Teresa Aaron is an 49-year-old  male with history significant for seropositive rheumatoid arthritis (positive rheumatoid factor and CCP antibody) who presents for follow-up of osteoporosis  He is currently on methotrexate 4 tablets weekly with folic acid 1 mg daily, hydroxychloroquine 200 mg b i d  and prednisone 5 mg daily  # Osteoporosis without current evidence of pathological fracture  - Lester Jarquin was found to have a bone density consistent with osteoporosis on a recently done DEXA scan, and I requested he follow up in the office to further discuss osteoporosis and its management  We discussed various medications including bisphosphonates and denosumab, but will proceed with Prolia 60 mg subcutaneously every 6 months  We discussed the side effects associated with Prolia, including but not limited to, risk for infections, need for electrolyte monitoring, worsening arthralgias, atypical fractures or osteonecrosis  He is also due to see his dentist and possibly have dental work done  I advised him to discuss with his dentist prior to initiation of Prolia if there are any contraindications at this time from their perspective  - I also requested he obtain the below mentioned labs today  After review of the labs and clearance from his dentist, I advised him to call the office to further set up an appointment to receive the first Prolia injection  Following the Prolia injection I will repeat a BMP, magnesium and phosphorus in 2 weeks  - I also advised him to start taking calcium and vitamin-D supplements twice daily, to achieve a calcium intake of 1000 mg daily   - At this time it is most likely the osteoporosis is age related, and possibly aggravated with the underlying rheumatoid arthritis  He is currently on chronic steroid use at 5 mg daily, but since the arthritic symptoms have been well controlled I would like to begin a taper of the steroids    I advised him following today's visit to decrease prednisone to 5 mg every other day and I will reassess for a further taper at the next office visit  # Seropositive rheumatoid arthritis, currently in clinical remission, with evidence of chronic synovial hypertrophy and joint deformities  - As the arthritis is currently well controlled, I will continue him on the same regimen of methotrexate 4 tablets weekly with folic acid 1 mg daily, hydroxychloroquine 200 mg b i d  and prednisone as discussed above  He is up-to-date with high-risk medication monitoring   - Return to office for a routine visit in 3 months  Plan:  Diagnoses and all orders for this visit:    Age-related osteoporosis without current pathological fracture  -     Magnesium; Future  -     Phosphorus; Future  -     Vitamin D 25 hydroxy; Future  -     calcium carbonate-vitamin D (OSCAL-D) 500 mg-200 units per tablet; Take 1 tablet by mouth 2 (two) times a day with meals    Current use of steroid medication    Rheumatoid arthritis involving multiple sites with positive rheumatoid factor (HCC)    High risk medication use      Activities as tolerated    Diet: low carb/low fat, more greens/vegetables, adequate hydration  Exercise: try to maintain a low impact exercise regimen as much as possible  Walk for 30 minutes a day for at least 3 days a week    Encouraged to maintain good sleep hygiene  Continue other medications as prescribed by PCP and other specialists        RTC in 3 months  HPI    INITIAL VISIT NOTE:  Mr Wallace Judd is an 12-year-old male with history significant for seropositive rheumatoid arthritis (positive RF and CCP) who presents for initial evaluation and establishing with Rheumatology       Patient and his wife are present at today's visit and although they are both very coherent, there appears to be a slight lapse in recalling patients prior history   History today is obtained from patient, his wife, and also from review of his prior rheumatology records      He was diagnosed with seropositive rheumatoid arthritis about 15 years ago when he developed diffuse joint pains  He was first seen by Dr Graeme Awad (private practice rheumatologist) and was started on Hydroxychloroquine 400 mg daily along with low dose steroids, and it appears he was maintained on that regimen till about 2016, when he discontinued the medications as his symptoms had resolved  He remained mostly asymptomatic until earlier this year when he again experienced diffuse joint pains and swelling, but predominantly in his hands, wrists, shoulders and knees  Per the notes from his most recent rheumatologist, Dr Deetta Merlin from [facility], patient was restarted on Plaquenil and low dose prednisone, but patient is unaware of this and says he was not taking the medications  There was also conversation regarding step-up therapy to TNF-inhibitors, but unfortunately due to the cost Humira or Enbrel were never started  Overall, as per patient he is currently not on any medications for the rheumatoid arthritis      Currently, he states continued aching pain in his joints, mostly affecting his bilateral shoulders, left wrist, bilateral hands and left knee  He has swelling of his fingers, left wrist and knee as well  Morning stiffness lasts a few hours and slightly eases up but does not resolve completely  He denies fevers, sicca symptoms, mouth/nose ulcers, swollen glands, skin rash, abdominal pain, N/V/D or blood in stools  He does not get recurrent infections  A quantiferon checked in 7/2018 was negative          11/13/2018:  Patient is seen for follow-up today in view of seropositive rheumatoid arthritis with chronic deformities    He is currently on methotrexate 4 tablets weekly with folic acid 1 mg daily, hydroxychloroquine 200 mg b i d  and prednisone 5 mg daily      This combination of medications was started following his initial visit with me on 09/11, and he states there has been a significant improvement in his overall joint pains since starting this regimen  He noticed the improvement in the first few days, and states he only experiences occasional pain in his left hand 3rd and 4th digits, but otherwise denies any joint pains at this time  A few days ago he did have a twisting injury of his left knee which resulted in an occasional pain, but that is also improving  He does have chronic numbness down his left lower extremity  He denies any acutely swollen joints and states morning stiffness has also improved  He is otherwise tolerating the medications well without any noticeable side effects such as fevers, chills, recurrent infections, mouth or nose ulcers or GI upset  Of note he does give a history of a chronic dry cough for the past 1 year which is associated with a runny nose, and this has previously been attributed to allergies  He has not had any recent chest x-rays done  He has chronic shortness of breath on exertion which is unchanged  No chest pain  No other complaints at this time  12/11/2018:  Patient presents for follow-up today to discuss results of his bone density exam     He had a recent DEXA scan done on the 29th of November which showed osteoporosis and a T-score of -2 6 in the left hip  There is a 10 year risk of hip fracture being 11%, with a 10 year risk of major osteoporotic fracture being 20%  Patient denies any history of recent obvious fractures  He is willing to start medications for the osteoporosis  He is currently not taking calcium or vitamin-D supplements  In terms of the rheumatoid arthritis he has been doing well on a combination of methotrexate 4 tablets weekly, with hydroxychloroquine 200 mg b i d  and prednisone 5 mg daily  He denies any significant joint pains or swelling at today's visit  We did also review his labs following the prior visit which revealed improvement of his inflammatory markers, as well as an unremarkable CBC and CMP      He does report a head injury he sustained earlier today, and has a bruise at that site  He denies any headaches, dizziness or changes in his vision  No other acute complaints at today's visit  The following portions of the patient's history were reviewed and updated as appropriate: allergies, current medications, past family history, past medical history, past social history, past surgical history and problem list       Review of Systems  Constitutional: Negative for weight change, fevers, chills, night sweats, fatigue  ENT/Mouth: Negative for ear pain, nasal congestion, sinus pain, hoarseness, sore throat, rhinorrhea, swallowing difficulty  Positive for hearing loss  Eyes: Negative for pain, redness, discharge, vision changes  Cardiovascular: Negative for chest pain, SOB, palpitations  Respiratory: Negative for sputum, wheezing, dyspnea  Positive for cough  Gastrointestinal: Negative for nausea, vomiting, diarrhea, constipation, pain, heartburn  Genitourinary: Negative for dysuria, hematuria  Positive for urinary frequency  Musculoskeletal: As per HPI  Skin: Negative for skin rash, color changes  Neuro: Negative for weakness, numbness, tingling, loss of consciousness  Psych: Negative for anxiety, depression  Heme/Lymph: Negative for easy bruising, bleeding, lymphadenopathy          Past Medical History:   Diagnosis Date    CAD (coronary artery disease)     Cardiomyopathy ([facility] [address] )     Dupuytren contracture     Dyslipidemia     Essential hypertension     ICD (implantable cardioverter-defibrillator) in place     Lumbosacral radiculopathy at S1     Osteoarthritis     Rheumatoid arthritis (HCC)     SNHL (sensorineural hearing loss)     Spinal stenosis     Type 2 diabetes mellitus ([facility] [address] )        Past Surgical History:   Procedure Laterality Date    AORTIC VALVE REPLACEMENT      CHOLECYSTECTOMY         Social History     Social History    Marital status: /Civil Union     Spouse name: N/A    Number of children: N/A    Years of education: N/A     Occupational History    Not on file       Social History Main Topics    Smoking status: Former Smoker     Quit date: 1995    Smokeless tobacco: Never Used    Alcohol use No    Drug use: No    Sexual activity: Not on file     Other Topics Concern    Not on file     Social History Narrative    No narrative on file       Family History   Problem Relation Age of Onset    Heart disease Brother     Cancer Brother     COPD Father     Diabetes Mother        No Known Allergies      Current Outpatient Prescriptions:     albuterol (PROVENTIL HFA,VENTOLIN HFA) 90 mcg/act inhaler, Inhale 2 puffs, Disp: , Rfl:     amLODIPine (NORVASC) 10 mg tablet, take 1/2  tablet daily, Disp: , Rfl:     benazepril (LOTENSIN) 5 mg tablet, Take 5 mg by mouth, Disp: , Rfl:     fluticasone (FLONASE) 50 mcg/act nasal spray, 1 spray into each nostril daily, Disp: , Rfl:     folic acid (FOLVITE) 1 mg tablet, Take 1 mg by mouth, Disp: , Rfl:     gabapentin (NEURONTIN) 100 mg capsule, Take 100 mg by mouth, Disp: , Rfl:     glucose blood (ONE TOUCH ULTRA TEST) test strip, Check sugar daily and as needed  Dx: E11 9, Disp: , Rfl:     glucose blood test strip, Check sugar daily and as needed  Dx: E11 9, Disp: , Rfl:     hydroxychloroquine (PLAQUENIL) 200 mg tablet, Take 1 tablet (200 mg total) by mouth 2 (two) times a day with meals for 30 days, Disp: 60 tablet, Rfl: 5    Lancet Devices (ONE TOUCH DELICA LANCING DEV) MISC, Check sugar daily and as needed  Dx: E11 9, Disp: , Rfl:     methotrexate (RHEUMATREX) 2 5 MG tablet, Take 4 tablets (10 mg total) by mouth once a week, Disp: 16 tablet, Rfl: 2    metoprolol succinate (TOPROL XL) 50 mg 24 hr tablet, Take 50 mg by mouth, Disp: , Rfl:     ONETOUCH DELICA LANCETS 40Z MISC, Check sugar daily and as needed  Dx: E11 9, Disp: , Rfl:     potassium chloride (K-DUR,KLOR-CON) 10 mEq tablet, Take 10 mEq by mouth, Disp: , Rfl:     PredniSONE 5 MG TBEC, Take 1 tablet (5 mg total) by mouth daily, Disp: 30 tablet, Rfl: 5    rosuvastatin (CRESTOR) 10 MG tablet, 1 by mouth daily, Disp: , Rfl:     warfarin (COUMADIN) 2 mg tablet, 2 mg alternating with 3 mg daily or as directed, Disp: 90 tablet, Rfl: 1    calcium carbonate-vitamin D (OSCAL-D) 500 mg-200 units per tablet, Take 1 tablet by mouth 2 (two) times a day with meals, Disp: 60 tablet, Rfl: 11      Objective:    Vitals:    12/11/18 1149   BP: 134/75   Pulse: (!) 54   Weight: 77 1 kg (170 lb)   Height: 5' 7" (1 702 m)       Physical Exam  General: Well appearing, well nourished, in no distress  Oriented x 3, normal mood and affect  Ambulating with cane  Stooped posture  Skin: Good turgor, no rash or prominent lesions  Small bruise present on his scalp sustained after an injury earlier today  Nails: Normal color, no deformities  HEENT:  Head: Normocephalic, atraumatic  Eyes: Conjunctiva clear, sclera non-icteric, EOM intact  Nose: No external lesions, mucosa non-inflamed  Mouth: Mucous membranes moist, no mucosal lesions  Neck: Supple, thyroid non-enlarged and non-tender  No lymphadenopathy  Extremities: No amputations or deformities, cyanosis, edema  Musculoskeletal:   Hands - there is significant improvement noted in soft tissue swelling at his bilateral PIPs  He possibly has mild synovitis, but he is not significantly tender at his joints  He does have osteoarthritic changes present at his DIPs bilaterally  Right 5th digit has a fixed boutonniere deformity  Slight ulnar deviation of his right hand  MCPs are unremarkable  Wrists - no soft tissue swelling or tenderness appreciated  Elbows - chronic synovial hypertrophy noted more significantly at his right elbow  He has slight limitation in full extension of right elbow  Elbows are nontender  Shoulders - nontender without any soft tissue swelling, but would limited active abduction till 90° bilaterally    Able to perform increased range of motion with passive motion  Knees - they are nontender without soft tissue swelling  Crepitus present bilaterally  Ankles and feet - nontender with negative MTP squeeze test bilaterally  Neurologic: Alert and oriented  No focal neurological deficits appreciated  Psychiatric: Normal mood and affect  NAVYA Barrios    Rheumatology

## 2018-12-11 NOTE — TELEPHONE ENCOUNTER
We need to contact Aida Talavera regarding his overdue INR   He was due on 12/3 [facility] [place]

## 2018-12-11 NOTE — PATIENT INSTRUCTIONS
Plan to start Prolia every 6 months  Please check with your dentist if any work is needed prior to starting the medication  Please decrease prednisone 5 mg tablet to every other day  Denosumab (By injection)   Denosumab ([id number])  Treats osteoporosis, bone cancer, hypercalcemia, and other bone problems in patients who have cancer  Brand Name(s): Prolia, Xgeva   There may be other brand names for this medicine  When This Medicine Should Not Be Used: This medicine is not right for everyone  You should not receive it if you had an allergic reaction to denosumab or if you are pregnant  How to Use This Medicine:   Injectable  · A doctor or other health professional will give you this medicine  This medicine is usually given as a shot under the skin of your upper arm, upper thigh, or stomach  · This medicine should come with a Medication Guide  Ask your pharmacist for a copy if you do not have one  · Missed dose: Call your doctor or pharmacist for instructions  Drugs and Foods to Avoid:   Ask your doctor or pharmacist before using any other medicine, including over-the-counter medicines, vitamins, and herbal products  · Do not use Prolia® and Xgeva® together  They contain the same medicine  · Some medicines can affect how denosumab works  Tell your doctor if you are also using medicine that weakens your immune system, including a steroid or cancer medicine  Warnings While Using This Medicine:   · This medicine may cause birth defects if either partner is using it during conception or pregnancy  Tell your doctor right away if you or your partner becomes pregnant  Use an effective form of birth control  Women who are being treated with Ivana Garcia should continue using birth control for at least 5 months after the last dose  · Tell your doctor if you are breastfeeding, or if you have kidney disease, diabetes, gum disease, or an allergy to latex   Tell your doctor if you have problems with your thyroid, parathyroid, or digestive system  · This medicine may cause the following problems:  ¨ Low calcium levels in your blood  ¨ Increased risk of broken thigh bone  ¨ Increased risk of infections  ¨ Serious skin reactions  ¨ Severe bone, joint, or muscle pain  · This medicine can cause jaw problems  You must have regular dental exams while you are being treated with this medicine  Tell your dentist that you are using this medicine  Practice good oral hygiene  · Do not suddenly stop using Prolia® without checking first with your doctor  Doing so may increase risk for more fractures  Talk to your doctor about other medicine that you can take  · Your doctor will do lab tests at regular visits to check on the effects of this medicine  Keep all appointments  Possible Side Effects While Using This Medicine:   Call your doctor right away if you notice any of these side effects:  · Allergic reaction: Itching or hives, swelling in your face or hands, swelling or tingling in your mouth or throat, chest tightness, trouble breathing  · Blistering, peeling, red skin rash  · Chest pain, fast or uneven heartbeat, trouble breathing  · Fever, chills, cough, sore throat, body aches  · Lightheadedness, dizziness, fainting  · Muscle spasms or twitching, numbness or tingling in your fingers, toes, or lips  · Pain or burning during urination, change in how much or how often you urinate  · Pain, swelling, heavy feeling, or numbness in your mouth or jaw, loose teeth or other teeth problems  · Severe bone, joint, or muscle pain  · Unusual pain in your thigh, groin, or hip  If you notice these less serious side effects, talk with your doctor:   · Diarrhea, nausea  · Redness, pain, itching, burning, swelling, or a lump under your skin where the shot was given  · Tiredness or weakness  If you notice other side effects that you think are caused by this medicine, tell your doctor  Call your doctor for medical advice about side effects  You may report side effects to FDA at 4-921-FDA-5141  © 2017 [facility] Information is for End User's use only and may not be sold, redistributed or otherwise used for commercial purposes  The above information is an  only  It is not intended as medical advice for individual conditions or treatments  Talk to your doctor, nurse or pharmacist before following any medical regimen to see if it is safe and effective for you

## 2018-12-12 ENCOUNTER — APPOINTMENT (OUTPATIENT)
Dept: LAB | Facility: CLINIC | Age: 83
End: 2018-12-12
Payer: MEDICARE

## 2018-12-12 ENCOUNTER — ANTICOAG VISIT (OUTPATIENT)
Dept: FAMILY MEDICINE CLINIC | Facility: CLINIC | Age: 83
End: 2018-12-12

## 2018-12-12 DIAGNOSIS — I48.0 PAROXYSMAL ATRIAL FIBRILLATION (HCC): ICD-10-CM

## 2018-12-12 LAB
INR PPP: 2.12 (ref 0.86–1.17)
PROTHROMBIN TIME: 23.8 SECONDS (ref 11.8–14.2)

## 2018-12-12 PROCEDURE — 36415 COLL VENOUS BLD VENIPUNCTURE: CPT

## 2018-12-12 PROCEDURE — 85610 PROTHROMBIN TIME: CPT

## 2018-12-13 ENCOUNTER — OFFICE VISIT (OUTPATIENT)
Dept: CARDIOLOGY CLINIC | Facility: CLINIC | Age: 83
End: 2018-12-13
Payer: MEDICARE

## 2018-12-13 VITALS
SYSTOLIC BLOOD PRESSURE: 138 MMHG | HEIGHT: 67 IN | DIASTOLIC BLOOD PRESSURE: 68 MMHG | OXYGEN SATURATION: 98 % | HEART RATE: 54 BPM | BODY MASS INDEX: 26.68 KG/M2 | WEIGHT: 170 LBS

## 2018-12-13 DIAGNOSIS — I48.0 PAROXYSMAL ATRIAL FIBRILLATION (HCC): ICD-10-CM

## 2018-12-13 DIAGNOSIS — R06.00 DYSPNEA ON EXERTION: Primary | ICD-10-CM

## 2018-12-13 DIAGNOSIS — Z95.2 S/P AVR (AORTIC VALVE REPLACEMENT): ICD-10-CM

## 2018-12-13 DIAGNOSIS — I25.10 CORONARY ARTERY DISEASE INVOLVING NATIVE CORONARY ARTERY OF NATIVE HEART WITHOUT ANGINA PECTORIS: ICD-10-CM

## 2018-12-13 DIAGNOSIS — I10 ESSENTIAL HYPERTENSION: ICD-10-CM

## 2018-12-13 PROCEDURE — 93000 ELECTROCARDIOGRAM COMPLETE: CPT | Performed by: INTERNAL MEDICINE

## 2018-12-13 PROCEDURE — 99205 OFFICE O/P NEW HI 60 MIN: CPT | Performed by: INTERNAL MEDICINE

## 2018-12-13 NOTE — PROGRESS NOTES
Cardiology Consultation     Miriam Calle  [id number]  1934  [address] [facility] [address]    Consult for: CAD  Appreciate consult by: LLUVIA Chu      HPI: Miriam Calle is a 80y o  year old male who is here to establish cardiac care as he has recently moved to the area  He was previously seeing a cardiologist at 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 and he had pacemaker inserted at that time  Pacemaker was upgraded to a biventricular ICD because of reduction of ejection fraction after RV pacing  Subsequent ejection fraction improved  Patient is on Coumadin  for paroxysmal atrial fibrillation but denies history of palpitations (does not recall ever having atrial fibrillation)  Echocardiogram in March 2017 showed ejection fraction of 50-55% mild mitral regurgitation and normal aortic valve gradient  He has shortness of breath with exertion but denies any chest pain  He denies LE edema but has cramping in his legs        Past Medical History:   Diagnosis Date    CAD (coronary artery disease)     Cardiomyopathy ([address] )     Dupuytren contracture     Dyslipidemia     Essential hypertension     ICD (implantable cardioverter-defibrillator) in place     Lumbosacral radiculopathy at S1     Osteoarthritis     Rheumatoid arthritis (HCC)     SNHL (sensorineural hearing loss)     Spinal stenosis     Type 2 diabetes mellitus ([address] )      Social History     Social History    Marital status: /Civil Union     Spouse name: N/A    Number of children: N/A    Years of education: N/A     Occupational History    Not on file       Social History Main Topics    Smoking status: Former Smoker     Quit date: 1995    Smokeless tobacco: Never Used    Alcohol use No    Drug use: No    Sexual activity: Not on file     Other Topics Concern    Not on file     Social History Narrative    No narrative on file      Family History   Problem Relation Age of Onset    Heart disease Brother     Cancer Brother     COPD Father     Diabetes Mother      Past Surgical History:   Procedure Laterality Date    AORTIC VALVE REPLACEMENT      CHOLECYSTECTOMY         Current Outpatient Prescriptions:     albuterol (PROVENTIL HFA,VENTOLIN HFA) 90 mcg/act inhaler, Inhale 2 puffs, Disp: , Rfl:     amLODIPine (NORVASC) 10 mg tablet, take 1/2  tablet daily, Disp: , Rfl:     benazepril (LOTENSIN) 5 mg tablet, Take 5 mg by mouth, Disp: , Rfl:     calcium carbonate-vitamin D (OSCAL-D) 500 mg-200 units per tablet, Take 1 tablet by mouth 2 (two) times a day with meals, Disp: 60 tablet, Rfl: 11    fluticasone (FLONASE) 50 mcg/act nasal spray, 1 spray into each nostril daily, Disp: , Rfl:     folic acid (FOLVITE) 1 mg tablet, Take 1 mg by mouth, Disp: , Rfl:     gabapentin (NEURONTIN) 100 mg capsule, Take 100 mg by mouth, Disp: , Rfl:     glucose blood (ONE TOUCH ULTRA TEST) test strip, Check sugar daily and as needed  Dx: E11 9, Disp: , Rfl:     glucose blood test strip, Check sugar daily and as needed  Dx: E11 9, Disp: , Rfl:     hydroxychloroquine (PLAQUENIL) 200 mg tablet, Take 1 tablet (200 mg total) by mouth 2 (two) times a day with meals for 30 days, Disp: 60 tablet, Rfl: 5    Lancet Devices (ONE TOUCH DELICA LANCING DEV) MISC, Check sugar daily and as needed  Dx: E11 9, Disp: , Rfl:     methotrexate (RHEUMATREX) 2 5 MG tablet, Take 4 tablets (10 mg total) by mouth once a week, Disp: 16 tablet, Rfl: 2    metoprolol succinate (TOPROL XL) 50 mg 24 hr tablet, Take 50 mg by mouth, Disp: , Rfl:     ONETOUCH DELICA LANCETS 52P MISC, Check sugar daily and as needed  Dx: E11 9, Disp: , Rfl:     potassium chloride (K-DUR,KLOR-CON) 10 mEq tablet, Take 10 mEq by mouth, Disp: , Rfl:     PredniSONE 5 MG TBEC, Take 1 tablet (5 mg total) by mouth daily, Disp: 30 tablet, Rfl: 5    rosuvastatin (CRESTOR) 10 MG tablet, 1 by mouth daily, Disp: , Rfl:     warfarin (COUMADIN) 2 mg tablet, 2 mg alternating with 3 mg daily or as directed, Disp: 90 tablet, Rfl: 1  No Known Allergies      Review of Systems:  Review of Systems   Constitutional: Negative for chills, fatigue and fever  HENT: Negative for congestion, nosebleeds and postnasal drip  Respiratory: Positive for cough, shortness of breath and wheezing  Negative for chest tightness  Cardiovascular: Negative for chest pain, palpitations and leg swelling  Gastrointestinal: Negative for abdominal distention, abdominal pain, diarrhea, nausea and vomiting  Endocrine: Negative for polydipsia, polyphagia and polyuria  Musculoskeletal: Positive for arthralgias, gait problem and myalgias  Skin: Negative for color change, pallor and rash  Allergic/Immunologic: Negative for environmental allergies, food allergies and immunocompromised state  Neurological: Negative for dizziness, seizures, syncope and light-headedness  Hematological: Negative for adenopathy  Does not bruise/bleed easily  Psychiatric/Behavioral: Negative for dysphoric mood  The patient is not nervous/anxious  Physical Exam:  Vitals:    12/13/18 0822   BP: 138/68   BP Location: Right arm   Patient Position: Sitting   Cuff Size: Standard   Pulse: (!) 54   SpO2: 98%   Weight: 77 1 kg (170 lb)   Height: 5' 7" (1 702 m)     Physical Exam   Constitutional: He is oriented to person, place, and time  He appears well-developed  No distress  HENT:   Head: Normocephalic and atraumatic     Eyes: Pupils are equal, round, and reactive to light  Conjunctivae and EOM are normal    Neck: Neck supple  No JVD present  No thyromegaly present  Cardiovascular: Normal rate and regular rhythm  Exam reveals no gallop and no friction rub  Murmur heard  Pulmonary/Chest: Effort normal  No respiratory distress  He has wheezes  He has no rales  He exhibits no tenderness  Abdominal: Soft  He exhibits no distension  There is no tenderness  Musculoskeletal: He exhibits no edema  Neurological: He is alert and oriented to person, place, and time  No cranial nerve deficit  Skin: Skin is warm and dry  No rash noted  He is not diaphoretic  No erythema  Psychiatric: He has a normal mood and affect   His behavior is normal  Judgment and thought content normal        Labs:  Appointment on 12/12/2018   Component Date Value    Protime 12/12/2018 23 8*    INR 12/12/2018 2 12*   Appointment on 12/11/2018   Component Date Value    Magnesium 12/11/2018 2 2     Phosphorus 12/11/2018 3 0     Vit D, 25-Hydroxy 12/11/2018 19 7*   Appointment on 11/13/2018   Component Date Value    Sodium 11/13/2018 136     Potassium 11/13/2018 3 9     Chloride 11/13/2018 102     CO2 11/13/2018 29     ANION GAP 11/13/2018 5     BUN 11/13/2018 8     Creatinine 11/13/2018 0 61     Glucose 11/13/2018 92     Calcium 11/13/2018 8 5     AST 11/13/2018 24     ALT 11/13/2018 19     Alkaline Phosphatase 11/13/2018 115     Total Protein 11/13/2018 7 4     Albumin 11/13/2018 3 5     Total Bilirubin 11/13/2018 0 66     eGFR 11/13/2018 92     WBC 11/13/2018 9 56     RBC 11/13/2018 4 77     Hemoglobin 11/13/2018 12 3     Hematocrit 11/13/2018 40 6     MCV 11/13/2018 85     MCH 11/13/2018 25 8*    MCHC 11/13/2018 30 3*    RDW 11/13/2018 16 8*    MPV 11/13/2018 9 7     Platelets 91/76/7853 271     nRBC 11/13/2018 0     Neutrophils Relative 11/13/2018 76*    Immat GRANS % 11/13/2018 0     Lymphocytes Relative 11/13/2018 16     Monocytes Relative 11/13/2018 6     Eosinophils Relative 11/13/2018 1     Basophils Relative 11/13/2018 1     Neutrophils Absolute 11/13/2018 7 23     Immature Grans Absolute 11/13/2018 0 03     Lymphocytes Absolute 11/13/2018 1 54     Monocytes Absolute 11/13/2018 0 57     Eosinophils Absolute 11/13/2018 0 11     Basophils Absolute 11/13/2018 0 08     CRP 11/13/2018 4 6*    Sed Rate 11/13/2018 16*   Appointment on 11/02/2018   Component Date Value    Protime 11/02/2018 23 8*    INR 11/02/2018 2 12*   Appointment on 10/23/2018   Component Date Value    Protime 10/23/2018 25 2*    INR 10/23/2018 2 28*     Imaging: Xr Chest Pa & Lateral    Result Date: 11/15/2018  Narrative: CHEST INDICATION:   [id number]: Other long term (current) drug therapy  COMPARISON:  None EXAM PERFORMED/VIEWS:  XR CHEST PA & LATERAL Images: 3 FINDINGS:  Biventricular pacemaker seen Postsurgical changes from prior median sternotomy No acute consolidation congestion Osseous structures appear within normal limits for patient age  Impression: No acute consolidation or congestion Workstation performed: [id number]     Dxa Bone Density Spine Hip And Pelvis    Result Date: 11/29/2018  Narrative: CENTRAL  DXA SCAN CLINICAL HISTORY:   80year old  male risk factors include previous smoking  Rheumatoid arthritis  Osteoporosis screening  TECHNIQUE: Bone densitometry was performed using a [place] bone densitometer  Regions of interest appear properly placed  There are no obvious fractures or other confounding variables which could limit the study  Degenerative changes of the lumbar spine and hip  This will falsely elevate the bone mineral densities in these regions  COMPARISON:  None   RESULTS: LUMBAR SPINE:  L1-L4: BMD 1 415 gm/cm2 T-score 1 4 Z-score 2 2 LEFT TOTAL HIP: BMD 0 730 gm/cm2 T-score -2 6 Z-score -1 3 LEFT FEMORAL NECK: BMD 0 731 gm/cm2 T-score -2 6 Z-score -0 9 TOTAL RIGHT HIP: BMD 0 741 gm/sq-cm, T-score is -2 5 Z-score is -1 2 FEMORAL NECK RIGHT HIP : BMD 0 805 gm/sq-cm, T-score is -2 0 Z-score is -0 4     Impression: 1  Based on the [facility] classification, the T-score of -2 6 in the left hip is consistent with OSTEOPOROSIS  2   According to the [address] [place] [address], prescription therapy is recommended with a T-score of -2 5 or less in the spine or hip after appropriate evaluation to exclude secondary causes  3   A daily intake of at least 1200 mg Calcium and 800 to 1000 IU of Vitamin D, as well as weight bearing and muscle strengthening exercise, fall prevention and avoidance of tobacco and excessive alcohol intake as  basic preventive measures are suggested  4   Repeat DXA  in 18 - 24 months, on the same machine, as clinically indicated  The 10 year risk of hip fracture is 11%, with the 10 year risk of major osteoporotic fracture being 20%, as calculated by the [facility] fracture risk assessment tool (FRAX)  The current NOF guidelines recommend treating patients with FRAX 10 year risk score of >3% for hip fracture and >20% for major osteoporotic fracture  WHO CLASSIFICATION: Normal (a T-score of -1 0 or higher) Low bone mineral density (a T-score of less than -1 0 but higher than -2 5) Osteoporosis (a T-score of -2 5 or less) Severe osteoporosis (a T-score of -2 5 or less with a fragility fracture)   Workstation performed: [id number]     EKG (independently reviewed):  Sinus rhythm at 55 beats per minute with nonspecific interventricular conduction delay    Discussion/Summary:  1  Dyspnea on exertion  - Patient had dyspnea prior to PCI placement - will obtain nuclear stress test (cannot walk on treadmill and has pacemaker)  - 2D echocardiogram to evaluate valve gradients    2  Coronary artery disease involving native coronary artery of native heart without angina pectoris  - ASA  - NM myocardial perfusion spect (rx stress and/or rest); Future  - Echo complete with contrast if indicated; Future    3   Essential hypertension  - BP well controlled on current Rx     4  S/P AVR (aortic valve replacement)  - Will obtain 2D echocardiogram    5  Paroxysmal atrial fibrillation  - on coumadin therapy  6  SSS s/p pacemaker  - will establish care in pacemaker clinic

## 2018-12-13 NOTE — LETTER
December 13, 2018     [facility], [address] [place]    Patient: James Sharma   YOB: 1934   Date of Visit: 12/13/2018       Dear Dr Nation Force: Thank you for referring James Sharma to me for evaluation  Below are my notes for this consultation  If you have questions, please do not hesitate to call me  I look forward to following your patient along with you  Sincerely,        Kathi Yancey DO        CC: No Recipients  Em Harrell DO  12/13/2018 10:05 AM  Sign at close encounter               Cardiology Consultation     James Sharma  [id number]  1934  [address]  [address] [place] [id number]    Consult for: CAD  Appreciate consult by: LLUVIA Escobar      HPI: James Sharma is a 80y o  year old male who is here to establish cardiac care as he has recently moved to the area  He was previously seeing a cardiologist at [facility]  Previous records were reviewed through the Care [facility] system  According to the patient, he had a myocardial infarction in 1995  Shortly afterwards, he had a stent placed to an unknown vessel  In December 2011, he had an aortic valve replacement (25 mm trifecta tissue valve) and may have had bypass at that time as he had vein removed from his left leg  Procedure was complicated by complete heart block and he had pacemaker inserted at that time  Pacemaker was upgraded to a biventricular ICD because of reduction of ejection fraction after RV pacing  Subsequent ejection fraction improved  Patient is on Coumadin  for paroxysmal atrial fibrillation but denies history of palpitations (does not recall ever having atrial fibrillation)  Echocardiogram in March 2017 showed ejection fraction of 50-55% mild mitral regurgitation and normal aortic valve gradient  He has shortness of breath with exertion but denies any chest pain    He denies LE edema but has cramping in his legs  Past Medical History:   Diagnosis Date    CAD (coronary artery disease)     Cardiomyopathy ([facility] )     Dupuytren contracture     Dyslipidemia     Essential hypertension     ICD (implantable cardioverter-defibrillator) in place     Lumbosacral radiculopathy at S1     Osteoarthritis     Rheumatoid arthritis (HCC)     SNHL (sensorineural hearing loss)     Spinal stenosis     Type 2 diabetes mellitus ([facility] )      Social History     Social History    Marital status: /Civil Union     Spouse name: N/A    Number of children: N/A    Years of education: N/A     Occupational History    Not on file       Social History Main Topics    Smoking status: Former Smoker     Quit date: 1995    Smokeless tobacco: Never Used    Alcohol use No    Drug use: No    Sexual activity: Not on file     Other Topics Concern    Not on file     Social History Narrative    No narrative on file      Family History   Problem Relation Age of Onset    Heart disease Brother     Cancer Brother     COPD Father     Diabetes Mother      Past Surgical History:   Procedure Laterality Date    AORTIC VALVE REPLACEMENT      CHOLECYSTECTOMY         Current Outpatient Prescriptions:     albuterol (PROVENTIL HFA,VENTOLIN HFA) 90 mcg/act inhaler, Inhale 2 puffs, Disp: , Rfl:     amLODIPine (NORVASC) 10 mg tablet, take 1/2  tablet daily, Disp: , Rfl:     benazepril (LOTENSIN) 5 mg tablet, Take 5 mg by mouth, Disp: , Rfl:     calcium carbonate-vitamin D (OSCAL-D) 500 mg-200 units per tablet, Take 1 tablet by mouth 2 (two) times a day with meals, Disp: 60 tablet, Rfl: 11    fluticasone (FLONASE) 50 mcg/act nasal spray, 1 spray into each nostril daily, Disp: , Rfl:     folic acid (FOLVITE) 1 mg tablet, Take 1 mg by mouth, Disp: , Rfl:     gabapentin (NEURONTIN) 100 mg capsule, Take 100 mg by mouth, Disp: , Rfl:     glucose blood (ONE TOUCH ULTRA TEST) test strip, Check sugar daily and as needed  Dx: E11 9, Disp: , Rfl:     glucose blood test strip, Check sugar daily and as needed  Dx: E11 9, Disp: , Rfl:     hydroxychloroquine (PLAQUENIL) 200 mg tablet, Take 1 tablet (200 mg total) by mouth 2 (two) times a day with meals for 30 days, Disp: 60 tablet, Rfl: 5    Lancet Devices (ONE TOUCH DELICA LANCING DEV) MISC, Check sugar daily and as needed  Dx: E11 9, Disp: , Rfl:     methotrexate (RHEUMATREX) 2 5 MG tablet, Take 4 tablets (10 mg total) by mouth once a week, Disp: 16 tablet, Rfl: 2    metoprolol succinate (TOPROL XL) 50 mg 24 hr tablet, Take 50 mg by mouth, Disp: , Rfl:     ONETOUCH DELICA LANCETS 53M MISC, Check sugar daily and as needed  Dx: E11 9, Disp: , Rfl:     potassium chloride (K-DUR,KLOR-CON) 10 mEq tablet, Take 10 mEq by mouth, Disp: , Rfl:     PredniSONE 5 MG TBEC, Take 1 tablet (5 mg total) by mouth daily, Disp: 30 tablet, Rfl: 5    rosuvastatin (CRESTOR) 10 MG tablet, 1 by mouth daily, Disp: , Rfl:     warfarin (COUMADIN) 2 mg tablet, 2 mg alternating with 3 mg daily or as directed, Disp: 90 tablet, Rfl: 1  No Known Allergies      Review of Systems:  Review of Systems   Constitutional: Negative for chills, fatigue and fever  HENT: Negative for congestion, nosebleeds and postnasal drip  Respiratory: Positive for cough, shortness of breath and wheezing  Negative for chest tightness  Cardiovascular: Negative for chest pain, palpitations and leg swelling  Gastrointestinal: Negative for abdominal distention, abdominal pain, diarrhea, nausea and vomiting  Endocrine: Negative for polydipsia, polyphagia and polyuria  Musculoskeletal: Positive for arthralgias, gait problem and myalgias  Skin: Negative for color change, pallor and rash  Allergic/Immunologic: Negative for environmental allergies, food allergies and immunocompromised state  Neurological: Negative for dizziness, seizures, syncope and light-headedness  Hematological: Negative for adenopathy   Does not bruise/bleed easily  Psychiatric/Behavioral: Negative for dysphoric mood  The patient is not nervous/anxious  Physical Exam:  Vitals:    12/13/18 0822   BP: 138/68   BP Location: Right arm   Patient Position: Sitting   Cuff Size: Standard   Pulse: (!) 54   SpO2: 98%   Weight: 77 1 kg (170 lb)   Height: 5' 7" (1 702 m)     Physical Exam   Constitutional: He is oriented to person, place, and time  He appears well-developed  No distress  HENT:   Head: Normocephalic and atraumatic  Eyes: Pupils are equal, round, and reactive to light  Conjunctivae and EOM are normal    Neck: Neck supple  No JVD present  No thyromegaly present  Cardiovascular: Normal rate and regular rhythm  Exam reveals no gallop and no friction rub  Murmur heard  Pulmonary/Chest: Effort normal  No respiratory distress  He has wheezes  He has no rales  He exhibits no tenderness  Abdominal: Soft  He exhibits no distension  There is no tenderness  Musculoskeletal: He exhibits no edema  Neurological: He is alert and oriented to person, place, and time  No cranial nerve deficit  Skin: Skin is warm and dry  No rash noted  He is not diaphoretic  No erythema  Psychiatric: He has a normal mood and affect   His behavior is normal  Judgment and thought content normal        Labs:  Appointment on 12/12/2018   Component Date Value    Protime 12/12/2018 23 8*    INR 12/12/2018 2 12*   Appointment on 12/11/2018   Component Date Value    Magnesium 12/11/2018 2 2     Phosphorus 12/11/2018 3 0     Vit D, 25-Hydroxy 12/11/2018 19 7*   Appointment on 11/13/2018   Component Date Value    Sodium 11/13/2018 136     Potassium 11/13/2018 3 9     Chloride 11/13/2018 102     CO2 11/13/2018 29     ANION GAP 11/13/2018 5     BUN 11/13/2018 8     Creatinine 11/13/2018 0 61     Glucose 11/13/2018 92     Calcium 11/13/2018 8 5     AST 11/13/2018 24     ALT 11/13/2018 19     Alkaline Phosphatase 11/13/2018 115     Total Protein 11/13/2018 7 4     Albumin 11/13/2018 3 5     Total Bilirubin 11/13/2018 0 66     eGFR 11/13/2018 92     WBC 11/13/2018 9 56     RBC 11/13/2018 4 77     Hemoglobin 11/13/2018 12 3     Hematocrit 11/13/2018 40 6     MCV 11/13/2018 85     MCH 11/13/2018 25 8*    MCHC 11/13/2018 30 3*    RDW 11/13/2018 16 8*    MPV 11/13/2018 9 7     Platelets 71/92/7229 271     nRBC 11/13/2018 0     Neutrophils Relative 11/13/2018 76*    Immat GRANS % 11/13/2018 0     Lymphocytes Relative 11/13/2018 16     Monocytes Relative 11/13/2018 6     Eosinophils Relative 11/13/2018 1     Basophils Relative 11/13/2018 1     Neutrophils Absolute 11/13/2018 7 23     Immature Grans Absolute 11/13/2018 0 03     Lymphocytes Absolute 11/13/2018 1 54     Monocytes Absolute 11/13/2018 0 57     Eosinophils Absolute 11/13/2018 0 11     Basophils Absolute 11/13/2018 0 08     CRP 11/13/2018 4 6*    Sed Rate 11/13/2018 16*   Appointment on 11/02/2018   Component Date Value    Protime 11/02/2018 23 8*    INR 11/02/2018 2 12*   Appointment on 10/23/2018   Component Date Value    Protime 10/23/2018 25 2*    INR 10/23/2018 2 28*     Imaging: Xr Chest Pa & Lateral    Result Date: 11/15/2018  Narrative: CHEST INDICATION:   [id number]: Other long term (current) drug therapy  COMPARISON:  None EXAM PERFORMED/VIEWS:  XR CHEST PA & LATERAL Images: 3 FINDINGS:  Biventricular pacemaker seen Postsurgical changes from prior median sternotomy No acute consolidation congestion Osseous structures appear within normal limits for patient age  Impression: No acute consolidation or congestion Workstation performed: [id number]     Dxa Bone Density Spine Hip And Pelvis    Result Date: 11/29/2018  Narrative: CENTRAL  DXA SCAN CLINICAL HISTORY:   80year old  male risk factors include previous smoking  Rheumatoid arthritis  Osteoporosis screening  TECHNIQUE: Bone densitometry was performed using a Asthmatx bone densitometer    [facility] interest appear properly placed  There are no obvious fractures or other confounding variables which could limit the study  Degenerative changes of the lumbar spine and hip  This will falsely elevate the bone mineral densities in these regions  COMPARISON:  None  RESULTS: LUMBAR SPINE:  L1-L4: BMD 1 415 gm/cm2 T-score 1 4 Z-score 2 2 LEFT TOTAL HIP: BMD 0 730 gm/cm2 T-score -2 6 Z-score -1 3 LEFT FEMORAL NECK: BMD 0 731 gm/cm2 T-score -2 6 Z-score -0 9 TOTAL RIGHT HIP: BMD 0 741 gm/sq-cm, T-score is -2 5 Z-score is -1 2                  FEMORAL NECK RIGHT HIP : BMD 0 805 gm/sq-cm, T-score is -2 0 Z-score is -0 4     Impression: 1  Based on the [facility] classification, the T-score of -2 6 in the left hip is consistent with OSTEOPOROSIS  2   According to the [address] [place] [address], prescription therapy is recommended with a T-score of -2 5 or less in the spine or hip after appropriate evaluation to exclude secondary causes  3   A daily intake of at least 1200 mg Calcium and 800 to 1000 IU of Vitamin D, as well as weight bearing and muscle strengthening exercise, fall prevention and avoidance of tobacco and excessive alcohol intake as  basic preventive measures are suggested  4   Repeat DXA  in 18 - 24 months, on the same machine, as clinically indicated  The 10 year risk of hip fracture is 11%, with the 10 year risk of major osteoporotic fracture being 20%, as calculated by the [facility] fracture risk assessment tool (FRAX)  The current NOF guidelines recommend treating patients with FRAX 10 year risk score of >3% for hip fracture and >20% for major osteoporotic fracture   WHO CLASSIFICATION: Normal (a T-score of -1 0 or higher) Low bone mineral density (a T-score of less than -1 0 but higher than -2 5) Osteoporosis (a T-score of -2 5 or less) Severe osteoporosis (a T-score of -2 5 or less with a fragility fracture)   Workstation performed: [id number]     EKG (independently reviewed):  Sinus rhythm at 55 beats per minute with nonspecific interventricular conduction delay    Discussion/Summary:  1  Dyspnea on exertion  - Patient had dyspnea prior to PCI placement - will obtain nuclear stress test (cannot walk on treadmill and has pacemaker)  - 2D echocardiogram to evaluate valve gradients    2  Coronary artery disease involving native coronary artery of native heart without angina pectoris  - ASA  - NM myocardial perfusion spect (rx stress and/or rest); Future  - Echo complete with contrast if indicated; Future    3  Essential hypertension  - BP well controlled on current Rx     4  S/P AVR (aortic valve replacement)  - Will obtain 2D echocardiogram    5  Paroxysmal atrial fibrillation  - on coumadin therapy  6  SSS s/p pacemaker  - will establish care in pacemaker clinic

## 2018-12-17 ENCOUNTER — TELEPHONE (OUTPATIENT)
Dept: OBGYN CLINIC | Facility: HOSPITAL | Age: 83
End: 2018-12-17

## 2018-12-17 DIAGNOSIS — E55.9 VITAMIN D DEFICIENCY: Primary | ICD-10-CM

## 2018-12-17 RX ORDER — ERGOCALCIFEROL 1.25 MG/1
50000 CAPSULE ORAL WEEKLY
Qty: 4 CAPSULE | Refills: 0 | Status: SHIPPED | OUTPATIENT
Start: 2018-12-17 | End: 2019-04-29

## 2018-12-17 NOTE — TELEPHONE ENCOUNTER
Rupesh Fletcher  [phone]    Dr Papito Fermin    Patient called back to notify his dentist said it is ok to go ahead with injection

## 2018-12-17 NOTE — TELEPHONE ENCOUNTER
Please advise patient to start taking Vitamin D supplements as his levels were low  I will send this to the pharmacy  It will be ONE tablet WEEKLY  Please also schedule patient for a visit at [place] to receive the Prolia injection

## 2018-12-18 NOTE — TELEPHONE ENCOUNTER
Spoke with Patient about him starting the Vitamin D2 50,000 units once weekly as prescribed  Also told patient that he may take the other vitamin d prescribed twice daily as instructed   He also was also to schedule him in for an appointment for the prolia injection

## 2018-12-19 ENCOUNTER — APPOINTMENT (OUTPATIENT)
Dept: RADIOLOGY | Facility: CLINIC | Age: 83
End: 2018-12-19
Payer: MEDICARE

## 2018-12-19 ENCOUNTER — OFFICE VISIT (OUTPATIENT)
Dept: RHEUMATOLOGY | Facility: CLINIC | Age: 83
End: 2018-12-19
Payer: MEDICARE

## 2018-12-19 ENCOUNTER — OFFICE VISIT (OUTPATIENT)
Dept: FAMILY MEDICINE CLINIC | Facility: CLINIC | Age: 83
End: 2018-12-19
Payer: MEDICARE

## 2018-12-19 VITALS
DIASTOLIC BLOOD PRESSURE: 71 MMHG | SYSTOLIC BLOOD PRESSURE: 113 MMHG | WEIGHT: 170 LBS | HEIGHT: 67 IN | BODY MASS INDEX: 26.68 KG/M2 | HEART RATE: 71 BPM

## 2018-12-19 VITALS
BODY MASS INDEX: 26.53 KG/M2 | SYSTOLIC BLOOD PRESSURE: 120 MMHG | HEART RATE: 72 BPM | HEIGHT: 67 IN | TEMPERATURE: 96.1 F | DIASTOLIC BLOOD PRESSURE: 60 MMHG | WEIGHT: 169 LBS | RESPIRATION RATE: 16 BRPM

## 2018-12-19 DIAGNOSIS — R79.89 ABNORMAL TSH: ICD-10-CM

## 2018-12-19 DIAGNOSIS — M25.562 ACUTE PAIN OF LEFT KNEE: ICD-10-CM

## 2018-12-19 DIAGNOSIS — Z00.00 MEDICARE ANNUAL WELLNESS VISIT, SUBSEQUENT: Primary | ICD-10-CM

## 2018-12-19 DIAGNOSIS — E11.9 TYPE 2 DIABETES MELLITUS WITH HEMOGLOBIN A1C GOAL OF LESS THAN 7.0% (HCC): ICD-10-CM

## 2018-12-19 DIAGNOSIS — M25.20 JOINT LAXITY: ICD-10-CM

## 2018-12-19 DIAGNOSIS — I10 ESSENTIAL HYPERTENSION: ICD-10-CM

## 2018-12-19 DIAGNOSIS — Z12.5 SCREENING PSA (PROSTATE SPECIFIC ANTIGEN): ICD-10-CM

## 2018-12-19 DIAGNOSIS — M81.0 AGE-RELATED OSTEOPOROSIS WITHOUT CURRENT PATHOLOGICAL FRACTURE: Primary | ICD-10-CM

## 2018-12-19 DIAGNOSIS — E78.5 DYSLIPIDEMIA, GOAL LDL BELOW 70: ICD-10-CM

## 2018-12-19 DIAGNOSIS — Z23 NEED FOR SHINGLES VACCINE: ICD-10-CM

## 2018-12-19 DIAGNOSIS — I42.9 CARDIOMYOPATHY, UNSPECIFIED TYPE (HCC): ICD-10-CM

## 2018-12-19 DIAGNOSIS — M06.9 RHEUMATOID ARTHRITIS, INVOLVING UNSPECIFIED SITE, UNSPECIFIED RHEUMATOID FACTOR PRESENCE: ICD-10-CM

## 2018-12-19 DIAGNOSIS — K92.1 MELENA: ICD-10-CM

## 2018-12-19 DIAGNOSIS — I44.2 CHB (COMPLETE HEART BLOCK) (HCC): ICD-10-CM

## 2018-12-19 DIAGNOSIS — R06.2 WHEEZING: ICD-10-CM

## 2018-12-19 PROBLEM — E11.42 DIABETIC POLYNEUROPATHY ASSOCIATED WITH TYPE 2 DIABETES MELLITUS (HCC): Status: RESOLVED | Noted: 2018-11-15 | Resolved: 2018-12-19

## 2018-12-19 PROCEDURE — 73564 X-RAY EXAM KNEE 4 OR MORE: CPT

## 2018-12-19 PROCEDURE — 90471 IMMUNIZATION ADMIN: CPT

## 2018-12-19 PROCEDURE — 90750 HZV VACC RECOMBINANT IM: CPT

## 2018-12-19 PROCEDURE — G0439 PPPS, SUBSEQ VISIT: HCPCS | Performed by: NURSE PRACTITIONER

## 2018-12-19 PROCEDURE — 96372 THER/PROPH/DIAG INJ SC/IM: CPT | Performed by: INTERNAL MEDICINE

## 2018-12-19 PROCEDURE — 99214 OFFICE O/P EST MOD 30 MIN: CPT | Performed by: NURSE PRACTITIONER

## 2018-12-19 NOTE — PROGRESS NOTES
Assessment/Plan:    Problem List Items Addressed This Visit        Endocrine    RESOLVED: Type 2 diabetes mellitus with hemoglobin A1c goal of less than 7 0% ([facility])    Relevant Orders    Comprehensive metabolic panel    Hemoglobin A1C    Microalbumin / creatinine urine ratio       Cardiovascular and Mediastinum    Card[facility] ([facility] )     Follows with cardiology  Plan for stress test and 2D echo         CHB (complete heart block) ([facility])     Pacemaker/AICD         Essential hypertension     Stable  Continue current medications            Musculoskeletal and Integument    RA (rheumatoid arthritis) ([facility] )     Stable  Follows with rheumatology            Other    Abnormal TSH     Repeat labs ordered         Relevant Orders    TSH, 3rd generation    T4, free    Dyslipidemia, goal LDL below 70     Labs up to date  Tolerating statin  Other Visit Diagnoses     Acute pain of left knee    -  Primary    Relevant Orders    XR knee 4+ vw left injury    Ambulatory referral to Orthopedic Surgery    Joint laxity        Relevant Orders    XR knee 4+ vw left injury    Ambulatory referral to Orthopedic Surgery    Need for shingles vaccine        Relevant Orders    Zoster Vaccine Recombinant IM (Completed)    Wheezing        Screening PSA (prostate specific antigen)        Relevant Orders    PSA, Total Screen    Melena        Relevant Orders    Occult Bloood,Fecal Immunochemical          Patient Instructions   Go for Xray today  Have labs done prior to visit in 3 months      Return in about 3 months (around 3/19/2019)  Subjective:      Patient ID: Jeanine Olmstead is a 80 y o  male  Chief Complaint   Patient presents with   [facility] Wellness Visit     [facility]       Here today for 2  Month follow up  He has been feeling well since his last visit  Approx 1 month ago, he fell and twisted his left knee  Since then, he has had worsening pain and feels like things are popping out of place    He does have some swelling, but denies redness or warmth  Not taking anything OTC for symptoms  Hurts along lateral aspect mostly  Saw cardiology locally  RA stable  Following with rheum  Requesting shingles vaccination today  Thinks he has noticed some blood in his stool for the past several months  Has had routine CRC screening in the past   No abdominal pain, appetite changes, or weight loss  Hypertension   This is a chronic problem  The problem is unchanged  The problem is controlled  Pertinent negatives include no chest pain, headaches, palpitations or shortness of breath  There are no associated agents to hypertension  Risk factors for coronary artery disease include dyslipidemia and family history  There are no compliance problems  Hyperlipidemia   This is a chronic problem  The problem is controlled  Recent lipid tests were reviewed and are normal  Pertinent negatives include no chest pain or shortness of breath  Current antihyperlipidemic treatment includes statins  There are no compliance problems  The following portions of the patient's history were reviewed and updated as appropriate: allergies, current medications, past family history, past medical history, past social history, past surgical history and problem list     Review of Systems   Constitutional: Negative for chills, fatigue and fever  Respiratory: Negative for cough, shortness of breath and wheezing  Cardiovascular: Negative for chest pain, palpitations and leg swelling  Gastrointestinal: Negative for abdominal pain, diarrhea, nausea and vomiting  Musculoskeletal: Positive for joint swelling  Skin: Negative for rash  Neurological: Negative for dizziness and headaches           Current Outpatient Prescriptions   Medication Sig Dispense Refill    albuterol (PROVENTIL HFA,VENTOLIN HFA) 90 mcg/act inhaler Inhale 2 puffs      amLODIPine (NORVASC) 10 mg tablet take 1/2  tablet daily      benazepril (LOTENSIN) 5 mg tablet Take 5 mg by mouth  calcium carbonate-vitamin D (OSCAL-D) 500 mg-200 units per tablet Take 1 tablet by mouth 2 (two) times a day with meals 60 tablet 11    ergocalciferol (VITAMIN D2) 50,000 units Take 1 capsule (50,000 Units total) by mouth once a week 4 capsule 0    fluticasone (FLONASE) 50 mcg/act nasal spray 1 spray into each nostril daily      folic acid (FOLVITE) 1 mg tablet Take 1 mg by mouth      gabapentin (NEURONTIN) 100 mg capsule Take 100 mg by mouth      glucose blood (ONE TOUCH ULTRA TEST) test strip Check sugar daily and as needed  Dx: E11 9      glucose blood test strip Check sugar daily and as needed  Dx: E11 9      hydroxychloroquine (PLAQUENIL) 200 mg tablet Take 1 tablet (200 mg total) by mouth 2 (two) times a day with meals for 30 days 60 tablet 5    Lancet Devices (ONE TOUCH DELICA LANCING DEV) MISC Check sugar daily and as needed  Dx: E11 9      methotrexate (RHEUMATREX) 2 5 MG tablet Take 4 tablets (10 mg total) by mouth once a week 16 tablet 2    metoprolol succinate (TOPROL XL) 50 mg 24 hr tablet Take 50 mg by mouth      ONETOUCH DELICA LANCETS 08X MISC Check sugar daily and as needed  Dx: E11 9      potassium chloride (K-DUR,KLOR-CON) 10 mEq tablet Take 10 mEq by mouth      PredniSONE 5 MG TBEC Take 1 tablet (5 mg total) by mouth daily 30 tablet 5    rosuvastatin (CRESTOR) 10 MG tablet 1 by mouth daily      warfarin (COUMADIN) 2 mg tablet 2 mg alternating with 3 mg daily or as directed 90 tablet 1     No current facility-administered medications for this visit  Objective:    /60   Pulse 72   Temp (!) 96 1 °F (35 6 °C)   Resp 16   Ht 5' 7" (1 702 m)   Wt 76 7 kg (169 lb)   BMI 26 47 kg/m²        Physical Exam   Constitutional: He appears well-developed and well-nourished  HENT:   Head: Normocephalic and atraumatic     Right Ear: Tympanic membrane, external ear and ear canal normal    Left Ear: Tympanic membrane, external ear and ear canal normal    Nose: No mucosal edema or rhinorrhea  Mouth/Throat: Uvula is midline, oropharynx is clear and moist and mucous membranes are normal    Eyes: Conjunctivae are normal    Neck: Neck supple  No edema present  No thyromegaly present  Cardiovascular: Normal rate, regular rhythm, normal heart sounds and intact distal pulses  No murmur heard  Pulmonary/Chest: Effort normal and breath sounds normal    Abdominal: Bowel sounds are normal  He exhibits no distension  There is no splenomegaly or hepatomegaly  There is no tenderness  Musculoskeletal:        Right knee: He exhibits swelling  He exhibits normal range of motion  Tenderness found  Lateral joint line tenderness noted  Lymphadenopathy:        Right cervical: No superficial cervical adenopathy present  Left cervical: No superficial cervical adenopathy present  Skin: Skin is warm, dry and intact  No rash noted  Psychiatric: He has a normal mood and affect  Nursing note and vitals reviewed               Luzmaria Corcoran

## 2018-12-19 NOTE — PROGRESS NOTES
Assessment and Plan:    Problem List Items Addressed This Visit        Endocrine    RESOLVED: Type 2 diabetes mellitus with hemoglobin A1c goal of less than 7 0% ([facility])    Relevant Orders    Comprehensive metabolic panel    Hemoglobin A1C    Microalbumin / creatinine urine ratio       Cardiovascular and Mediastinum    Cardiomyopathy ([facility] )     Follows with cardiology  Plan for stress test and 2D echo         CHB (complete heart block) ([facility])     Pacemaker/AICD         Essential hypertension     Stable  Continue current medications            Musculoskeletal and Integument    RA (rheumatoid arthritis) ([facility] )     Stable  Follows with rheumatology            Other    Abnormal TSH     Repeat labs ordered         Relevant Orders    TSH, 3rd generation    T4, free    Dyslipidemia, goal LDL below 70     Labs up to date  Tolerating statin  Other Visit Diagnoses     Acute pain of left knee    -  Primary    Relevant Orders    XR knee 4+ vw left injury    Ambulatory referral to Orthopedic Surgery    Joint laxity        Relevant Orders    XR knee 4+ vw left injury    Ambulatory referral to Orthopedic Surgery    Need for shingles vaccine        Relevant Orders    Zoster Vaccine Recombinant IM (Completed)    Wheezing        Screening PSA (prostate specific antigen)        Relevant Orders    PSA, Total Screen    Melena        Relevant Orders    Occult Bloood,Fecal Immunochemical    Medicare annual wellness visit, subsequent            Health Maintenance Due   Topic Date Due    Medicare Annual Wellness Visit (AWV)  1934    DM Eye Exam  12[birth year]14/1944    URINE MICROALBUMIN  12/14/1944    Pneumococcal PPSV23/PCV13 65+ Years / Low and Medium Risk (2 of 2 - PCV13) 11/01/2006         HPI:  Liz Watkins is a 80 y o  male here for his Subsequent Wellness Visit      Patient Active Problem List   Diagnosis    Patient refuses to disclose advance directive information    Bilateral sensorineural hearing loss    Biventricular ICD (implantable cardioverter-defibrillator) in place    Cardiomyopathy ([facility] )    CHB (complete heart block) ([place] [address] )    Coronary artery disease involving native coronary artery of native heart without angina pectoris    Dupuytren contracture    Dyslipidemia, goal LDL below 70    Hyperplasia of prostate with lower urinary tract symptoms (LUTS)    Former smoker    Essential hypertension    Lymphedema of left leg    Osteoarthritis of multiple joints    Pacemaker    Paroxysmal atrial fibrillation (HCC)    RA (rheumatoid arthritis) ([place] [address] )    Restrictive lung disease    RBBB    S/P AVR (aortic valve replacement)    S/P primary angioplasty with coronary stent    Spinal stenosis of lumbar region without neurogenic claudication    Abnormal TSH    Acquired deformity of foot    Pain in both feet    Peripheral arteriosclerosis ([place] [address] )    Onychomycosis    Dyspnea on exertion     Past Medical History:   Diagnosis Date    CAD (coronary artery disease)     Cardiomyopathy ([place] [address] )     Dupuytren contracture     Dyslipidemia     Essential hypertension     ICD (implantable cardioverter-defibrillator) in place     Lumbosacral radiculopathy at S1     Osteoarthritis     Rheumatoid arthritis ([place] [address] )     SNHL (sensorineural hearing loss)     Spinal stenosis     Type 2 diabetes mellitus ([place] [address] )      Past Surgical History:   Procedure Laterality Date    AORTIC VALVE REPLACEMENT      CHOLECYSTECTOMY       Family History   Problem Relation Age of Onset    Heart disease Brother     Cancer Brother     COPD Father     Diabetes Mother      History   Smoking Status    Former Smoker    Quit date: 1995   Smokeless Tobacco    Never Used     History   Alcohol Use No      History   Drug Use No       Current Outpatient Prescriptions   Medication Sig Dispense Refill    albuterol (PROVENTIL HFA,VENTOLIN HFA) 90 mcg/act inhaler Inhale 2 puffs      amLODIPine (NORVASC) 10 mg tablet take 1/2  tablet daily      benazepril (LOTENSIN) 5 mg tablet Take 5 mg by mouth      calcium carbonate-vitamin D (OSCAL-D) 500 mg-200 units per tablet Take 1 tablet by mouth 2 (two) times a day with meals 60 tablet 11    ergocalciferol (VITAMIN D2) 50,000 units Take 1 capsule (50,000 Units total) by mouth once a week 4 capsule 0    fluticasone (FLONASE) 50 mcg/act nasal spray 1 spray into each nostril daily      folic acid (FOLVITE) 1 mg tablet Take 1 mg by mouth      gabapentin (NEURONTIN) 100 mg capsule Take 100 mg by mouth      glucose blood (ONE TOUCH ULTRA TEST) test strip Check sugar daily and as needed  Dx: E11 9      glucose blood test strip Check sugar daily and as needed  Dx: E11 9      hydroxychloroquine (PLAQUENIL) 200 mg tablet Take 1 tablet (200 mg total) by mouth 2 (two) times a day with meals for 30 days 60 tablet 5    Lanc[employer] (ONE TOUCH DELICA LANCING DEV) MISC Check sugar daily and as needed  Dx: E11 9      methotrexate (RHEUMATREX) 2 5 MG tablet Take 4 tablets (10 mg total) by mouth once a week 16 tablet 2    metoprolol succinate (TOPROL XL) 50 mg 24 hr tablet Take 50 mg by mouth      ONETOUCH DELICA LANCETS 85S MISC Check sugar daily and as needed  Dx: E11 9      potassium chloride (K-DUR,KLOR-CON) 10 mEq tablet Take 10 mEq by mouth      PredniSONE 5 MG TBEC Take 1 tablet (5 mg total) by mouth daily 30 tablet 5    rosuvastatin (CRESTOR) 10 MG tablet 1 by mouth daily      warfarin (COUMADIN) 2 mg tablet 2 mg alternating with 3 mg daily or as directed 90 tablet 1     No current facility-administered medications for this visit        No Known Allergies  Immunization History   Administered Date(s) Administered    H1N1 Inj 01/11/2010    H1N1, All Formulations 01/11/2010    Influenza 11/01/2005, 10/12/2006, 10/16/2007, 10/30/2008, 10/02/2009, 09/28/2010, 10/31/2011, 09/24/2012, 09/23/2013, 10/22/2013, 09/16/2014, 11/15/2016, 10/02/2017    Influenza Split High Dose Preservative Free IM 11/15/2016, 10/02/2017    Influenza TIV (IM) 11/01/2005, 10/12/2006, 10/16/2007, 10/30/2008, 10/02/2009, 09/28/2010, 10/31/2011, 09/24/2012, 09/23/2013, 10/22/2013, 09/16/2014    Influenza, high dose seasonal 0 5 mL 09/27/2018    Pneumococcal Polysaccharide PPV23 11/01/2005    Tdap 07/17/2013    Zoster 10/30/2006    Zoster Vaccine Recombinant 12/19/2018       Patient Care Team:  María Elena Martino as PCP - General (Family Medicine)    Medicare Screening Tests and Risk Assessments:  Rubén Horvath is here for his Subsequent Wellness visit  Last Medicare Wellness visit information reviewed, patient interviewed and updates made to the record today  Health Risk Assessment:  Patient rates overall health as fair  Patient feels that their physical health rating is Slightly worse  Eyesight was rated as Same  Hearing was rated as Same  Patient feels that their emotional and mental health rating is Slightly better  Pain experienced by patient in the last 7 days has been A lot  Patient's pain rating has been 5/10  Patient states that he has experienced weight loss or gain in last 6 months  Emotional/Mental Health:  Patient has been feeling nervous/anxious  PHQ-9 Depression Screening:    Frequency of the following problems over the past two weeks:      1  Little interest or pleasure in doing things: 0 - not at all      2  Feeling down, depressed, or hopeless: 0 - not at all  PHQ-2 Score: 0          Broken Bones/Falls: Fall Risk Assessment:    In the past year, patient has experienced: History of falling in past year     Number of falls: 2 or more          Bladder/Bowel:  Patient has leaked urine accidently in the last six months  Patient reports no loss of bowel control  Immunizations:  Patient has had a flu vaccination within the last year  Patient has received a pneumonia shot  Patient has not received a shingles shot  Patient has received tetanus/diphtheria shot       Home Safety:  Patient does not have trouble with stairs inside or outside of their home  Patient currently reports that there are safety hazards present in home , working smoke alarms, working carbon monoxide detectors  Preventative Screenings:   prostate cancer screen performed, colon cancer screen completed, cholesterol screen completed, glaucoma eye exam completed,     Nutrition:  Current diet: Regular and Limited junk food with servings of the following:    Medications:  Patient is not currently taking any over-the-counter supplements  Patient is able to manage medications  Lifestyle Choices:  Patient reports no tobacco use  Patient has not smoked or used tobacco in the past   Patient reports no alcohol use  Patient drives a vehicle  Patient wears seat belt  Activities of Daily Living:  Can get out of bed by his or her self, able to dress self, able to make own meals, able to do own shopping, able to bathe self, can do own laundry/housekeeping, can manage own money, pay bills and track expenses    Previous Hospitalizations:  Hospitalization or ED visit in past 12 months  Number of hospitalizations within the last year: 1-2        Advanced Directives:  Patient has decided on a power of   Patient has spoken to designated power of   Patient has completed advanced directive  Preventative Screening/Counseling:      Cardiovascular:      General: Screening Current      Counseling: Healthy Diet and Healthy Weight          Diabetes:      General: Screening Current      Counseling: Healthy Diet          Colorectal Cancer:      General: Screening Not Indicated      Counseling: high fiber diet      Due for studies: Fecal Occult Blood      Comments: Pt does note possible blood in his stool  FIT testing ordered and will refer to GI pending results    Previous CRC screening completed        Prostate Cancer:      General: Risks and Benefits Discussed      Due for labs: PSA          Osteoporosis:      General: Screening Current Counseling: Calcium and Vitamin D Intake and Regular Weightbearing Exercise          AAA:  Male patient with age over [de-identified] years  General: Screening Not Indicated          Glaucoma:      General: Screening Current          HIV:      General: Patient Declines          Hepatitis C:      General: Patient Declines        Advanced Directives:   Patient has living will for healthcare, has durable POA for healthcare, patient has an advanced directive  End of life assessment reviewed with patient  Provider agrees with end of life decisions        Immunizations:  Patient reviewed and up to date      Influenza: Influenza UTD This Year      Shingrix: Shingrix Vaccine Needed Today      TD: Td Vaccine UTD      Other Preventative Counseling (Non-Medicare):  Alcohol Use, Fall Prevention, Increase physical activity, Nutrition Counseling and Skin self-exam

## 2018-12-20 DIAGNOSIS — R73.01 IMPAIRED FASTING GLUCOSE: Primary | ICD-10-CM

## 2018-12-21 ENCOUNTER — PATIENT OUTREACH (OUTPATIENT)
Dept: FAMILY MEDICINE CLINIC | Facility: CLINIC | Age: 83
End: 2018-12-21

## 2018-12-21 DIAGNOSIS — R73.01 IMPAIRED FASTING GLUCOSE: Primary | ICD-10-CM

## 2018-12-21 NOTE — PROGRESS NOTES
Mai Gonzalez returns my call  He needs help with remembering lab draws and appointments  He is agreeable to phone outreach although he prefers in person contact  He and his wife live in their home  He wants no help from his children for medical issues  PCP has spoken to me regarding missed appointments and showing up at the wrong offices  Mai Gonzalez wishes for standard, weekly lab draws and  Some way to remember appointments  I will involve Jayne Rose, the community health worker as well  I will speak to Trupti Alcocer NP for further suggestion next week when she returns from the holiday

## 2018-12-22 ENCOUNTER — ANTICOAG VISIT (OUTPATIENT)
Dept: FAMILY MEDICINE CLINIC | Facility: CLINIC | Age: 83
End: 2018-12-22

## 2018-12-22 ENCOUNTER — TELEPHONE (OUTPATIENT)
Dept: FAMILY MEDICINE CLINIC | Facility: CLINIC | Age: 83
End: 2018-12-22

## 2018-12-22 DIAGNOSIS — I48.0 PAROXYSMAL ATRIAL FIBRILLATION (HCC): ICD-10-CM

## 2018-12-22 NOTE — TELEPHONE ENCOUNTER
Discussed with Emily  I am concerned that he is missing 60 days worth of Coumadin  He is overdue for an INR and agreed to go on 12/24/18 first thing in the morning    Arina Granados, DO

## 2018-12-22 NOTE — TELEPHONE ENCOUNTER
Mr Maryse Fernando called because he said he went to refill his coumadin and the pharmacist told him it is too soon  I called the pharmacy ([facility]) to confirm this  Patient had prescription refilled on 11/27/18 for a 90 day prescription  He should have about 60 pills left, but Mr Maryse Fernando says he is completely out  I spoke to Dr Pancho Flannery who stated that patient needs to go for INR first thing Monday morning to check his level before we can refill or give new dosing  We are concerned he either lost the pills, or is taking too much  Mr Maryse Fernando is aware to get to the lab as soon as possible Monday morning since we are only here a half day Monday and that someone will call him with his INR level and new dosing, and that we can then possibly send in a new script to be filled  Mr Maryse Fernando verbalized his understanding that it is crucial to get his labs done ASAP so we can fix this issue   im going to leave this message open until we get the INR in on Monday Viviane Murphy MA

## 2018-12-24 ENCOUNTER — TRANSCRIBE ORDERS (OUTPATIENT)
Dept: ADMINISTRATIVE | Facility: HOSPITAL | Age: 83
End: 2018-12-24

## 2018-12-24 ENCOUNTER — APPOINTMENT (OUTPATIENT)
Dept: LAB | Facility: CLINIC | Age: 83
End: 2018-12-24
Payer: MEDICARE

## 2018-12-24 DIAGNOSIS — I48.0 PAROXYSMAL ATRIAL FIBRILLATION (HCC): Primary | ICD-10-CM

## 2018-12-24 DIAGNOSIS — I48.0 PAROXYSMAL ATRIAL FIBRILLATION (HCC): ICD-10-CM

## 2018-12-24 LAB
INR PPP: 1.85 (ref 0.86–1.17)
INR PPP: 1.85 (ref 0.86–1.17)
PROTHROMBIN TIME: 21.4 SECONDS (ref 11.8–14.2)

## 2018-12-24 PROCEDURE — 36415 COLL VENOUS BLD VENIPUNCTURE: CPT

## 2018-12-24 PROCEDURE — 85610 PROTHROMBIN TIME: CPT

## 2018-12-24 NOTE — TELEPHONE ENCOUNTER
Called lab to find out INR but there was no answer  [employer]  [facility] [phone]  Brisa Tamez MA

## 2018-12-24 NOTE — TELEPHONE ENCOUNTER
12/24/2018 2:11 PM Called patient regarding INR of 1 85    He was advised to restart his previous dose of 3 mg M/F and 4 on other days  I asked him to get his INR rechecked in 2 weeks  I then called his pharmacy and gave him new prescriptions for his current dose       Milka Brody DO

## 2018-12-26 ENCOUNTER — TRANSCRIBE ORDERS (OUTPATIENT)
Dept: ADMINISTRATIVE | Facility: HOSPITAL | Age: 83
End: 2018-12-26

## 2018-12-26 ENCOUNTER — APPOINTMENT (OUTPATIENT)
Dept: LAB | Facility: CLINIC | Age: 83
End: 2018-12-26
Payer: MEDICARE

## 2018-12-26 DIAGNOSIS — R79.89 ABNORMAL TSH: ICD-10-CM

## 2018-12-26 DIAGNOSIS — E11.9 TYPE 2 DIABETES MELLITUS WITH HEMOGLOBIN A1C GOAL OF LESS THAN 7.0% (HCC): ICD-10-CM

## 2018-12-26 DIAGNOSIS — I48.0 PAROXYSMAL ATRIAL FIBRILLATION (HCC): Primary | ICD-10-CM

## 2018-12-26 DIAGNOSIS — Z12.5 SCREENING PSA (PROSTATE SPECIFIC ANTIGEN): ICD-10-CM

## 2018-12-26 LAB
ALBUMIN SERPL BCP-MCNC: 3.1 G/DL (ref 3.5–5)
ALP SERPL-CCNC: 107 U/L (ref 46–116)
ALT SERPL W P-5'-P-CCNC: 12 U/L (ref 12–78)
ANION GAP SERPL CALCULATED.3IONS-SCNC: 7 MMOL/L (ref 4–13)
AST SERPL W P-5'-P-CCNC: 18 U/L (ref 5–45)
BILIRUB SERPL-MCNC: 0.61 MG/DL (ref 0.2–1)
BUN SERPL-MCNC: 12 MG/DL (ref 5–25)
CALCIUM SERPL-MCNC: 8.5 MG/DL (ref 8.3–10.1)
CHLORIDE SERPL-SCNC: 107 MMOL/L (ref 100–108)
CO2 SERPL-SCNC: 27 MMOL/L (ref 21–32)
CREAT SERPL-MCNC: 0.58 MG/DL (ref 0.6–1.3)
CREAT UR-MCNC: 111 MG/DL
EST. AVERAGE GLUCOSE BLD GHB EST-MCNC: 134 MG/DL
GFR SERPL CREATININE-BSD FRML MDRD: 94 ML/MIN/1.73SQ M
GLUCOSE P FAST SERPL-MCNC: 94 MG/DL (ref 65–99)
HBA1C MFR BLD: 6.3 % (ref 4.2–6.3)
MICROALBUMIN UR-MCNC: 26.5 MG/L (ref 0–20)
MICROALBUMIN/CREAT 24H UR: 24 MG/G CREATININE (ref 0–30)
POTASSIUM SERPL-SCNC: 4.1 MMOL/L (ref 3.5–5.3)
PROT SERPL-MCNC: 7.4 G/DL (ref 6.4–8.2)
PSA SERPL-MCNC: 0.5 NG/ML (ref 0–4)
SODIUM SERPL-SCNC: 141 MMOL/L (ref 136–145)
T4 FREE SERPL-MCNC: 1.27 NG/DL (ref 0.76–1.46)
TSH SERPL DL<=0.05 MIU/L-ACNC: 0.56 UIU/ML (ref 0.36–3.74)

## 2018-12-26 PROCEDURE — 82043 UR ALBUMIN QUANTITATIVE: CPT | Performed by: NURSE PRACTITIONER

## 2018-12-26 PROCEDURE — 36415 COLL VENOUS BLD VENIPUNCTURE: CPT

## 2018-12-26 PROCEDURE — 84443 ASSAY THYROID STIM HORMONE: CPT

## 2018-12-26 PROCEDURE — G0103 PSA SCREENING: HCPCS

## 2018-12-26 PROCEDURE — 84439 ASSAY OF FREE THYROXINE: CPT

## 2018-12-26 PROCEDURE — 82570 ASSAY OF URINE CREATININE: CPT | Performed by: NURSE PRACTITIONER

## 2018-12-26 PROCEDURE — 83036 HEMOGLOBIN GLYCOSYLATED A1C: CPT

## 2018-12-26 PROCEDURE — 80053 COMPREHEN METABOLIC PANEL: CPT

## 2018-12-28 ENCOUNTER — HOSPITAL ENCOUNTER (OUTPATIENT)
Dept: NON INVASIVE DIAGNOSTICS | Facility: HOSPITAL | Age: 83
Discharge: HOME | End: 2018-12-28
Attending: INTERNAL MEDICINE
Payer: MEDICARE

## 2018-12-28 ENCOUNTER — PATIENT OUTREACH (OUTPATIENT)
Dept: FAMILY MEDICINE CLINIC | Facility: CLINIC | Age: 83
End: 2018-12-28

## 2018-12-28 ENCOUNTER — HOSPITAL ENCOUNTER (OUTPATIENT)
Dept: RADIOLOGY | Facility: HOSPITAL | Age: 83
Discharge: HOME | End: 2018-12-28
Attending: INTERNAL MEDICINE
Payer: MEDICARE

## 2018-12-28 DIAGNOSIS — I25.10 CORONARY ARTERY DISEASE INVOLVING NATIVE CORONARY ARTERY OF NATIVE HEART WITHOUT ANGINA PECTORIS: ICD-10-CM

## 2018-12-28 DIAGNOSIS — Z95.2 S/P AVR (AORTIC VALVE REPLACEMENT): ICD-10-CM

## 2018-12-28 LAB
CHEST PAIN STATEMENT: NORMAL
MAX DIASTOLIC BP: 86 MMHG
MAX HEART RATE: 90 BPM
MAX PREDICTED HEART RATE: 136 BPM
MAX. SYSTOLIC BP: 169 MMHG
PROTOCOL NAME: NORMAL
REASON FOR TERMINATION: NORMAL
TARGET HR FORMULA: NORMAL
TEST INDICATION: NORMAL
TIME IN EXERCISE PHASE: NORMAL

## 2018-12-28 PROCEDURE — 93016 CV STRESS TEST SUPVJ ONLY: CPT | Performed by: INTERNAL MEDICINE

## 2018-12-28 PROCEDURE — 93017 CV STRESS TEST TRACING ONLY: CPT

## 2018-12-28 PROCEDURE — A9502 TC99M TETROFOSMIN: HCPCS

## 2018-12-28 PROCEDURE — 78452 HT MUSCLE IMAGE SPECT MULT: CPT | Performed by: INTERNAL MEDICINE

## 2018-12-28 PROCEDURE — 93018 CV STRESS TEST I&R ONLY: CPT | Performed by: INTERNAL MEDICINE

## 2018-12-28 PROCEDURE — 93306 TTE W/DOPPLER COMPLETE: CPT

## 2018-12-28 PROCEDURE — 78452 HT MUSCLE IMAGE SPECT MULT: CPT

## 2018-12-28 PROCEDURE — 93306 TTE W/DOPPLER COMPLETE: CPT | Performed by: INTERNAL MEDICINE

## 2018-12-28 RX ADMIN — REGADENOSON 0.4 MG: 0.08 INJECTION, SOLUTION INTRAVENOUS at 09:13

## 2018-12-31 ENCOUNTER — PATIENT OUTREACH (OUTPATIENT)
Dept: FAMILY MEDICINE CLINIC | Facility: CLINIC | Age: 83
End: 2018-12-31

## 2018-12-31 ENCOUNTER — APPOINTMENT (OUTPATIENT)
Dept: LAB | Facility: CLINIC | Age: 83
End: 2018-12-31
Payer: MEDICARE

## 2018-12-31 DIAGNOSIS — K92.1 MELENA: ICD-10-CM

## 2018-12-31 LAB — HEMOCCULT STL QL IA: NEGATIVE

## 2018-12-31 PROCEDURE — G0328 FECAL BLOOD SCRN IMMUNOASSAY: HCPCS

## 2018-12-31 NOTE — PROGRESS NOTES
Lizzy Howard states he and Federico Biggs have been taking day trips to see friends  We speak of ability to have weekly lab draws at [facility] for continuity  He agrees to this but then states "I don't think we need help anymore " He states he is not taking all meds listed but refuses review  I will contact again in one month  Lizzy Howard has my number if he desires to contact me sooner

## 2019-01-02 ENCOUNTER — APPOINTMENT (OUTPATIENT)
Dept: LAB | Facility: CLINIC | Age: 84
End: 2019-01-02
Payer: MEDICARE

## 2019-01-02 DIAGNOSIS — I48.0 PAROXYSMAL ATRIAL FIBRILLATION (HCC): ICD-10-CM

## 2019-01-02 LAB
INR PPP: 1.24 (ref 0.86–1.17)
PROTHROMBIN TIME: 15.7 SECONDS (ref 11.8–14.2)

## 2019-01-02 PROCEDURE — 36415 COLL VENOUS BLD VENIPUNCTURE: CPT

## 2019-01-02 PROCEDURE — 85610 PROTHROMBIN TIME: CPT

## 2019-01-07 ENCOUNTER — OFFICE VISIT (OUTPATIENT)
Dept: OBGYN CLINIC | Facility: CLINIC | Age: 84
End: 2019-01-07
Payer: MEDICARE

## 2019-01-07 VITALS
HEART RATE: 68 BPM | BODY MASS INDEX: 24.62 KG/M2 | HEIGHT: 70 IN | SYSTOLIC BLOOD PRESSURE: 133 MMHG | DIASTOLIC BLOOD PRESSURE: 74 MMHG | WEIGHT: 172 LBS

## 2019-01-07 DIAGNOSIS — M17.12 PRIMARY OSTEOARTHRITIS OF LEFT KNEE: Primary | ICD-10-CM

## 2019-01-07 PROCEDURE — 99203 OFFICE O/P NEW LOW 30 MIN: CPT | Performed by: ORTHOPAEDIC SURGERY

## 2019-01-07 RX ORDER — WARFARIN SODIUM 4 MG/1
TABLET ORAL
Refills: 0 | COMMUNITY
Start: 2018-12-24 | End: 2019-06-19 | Stop reason: SDUPTHER

## 2019-01-07 RX ORDER — HYDROXYCHLOROQUINE SULFATE 200 MG/1
TABLET, FILM COATED ORAL
Refills: 0 | COMMUNITY
Start: 2018-12-22 | End: 2019-01-08

## 2019-01-07 RX ORDER — WARFARIN SODIUM 3 MG/1
TABLET ORAL
Refills: 0 | COMMUNITY
Start: 2018-12-24 | End: 2019-02-28 | Stop reason: SDUPTHER

## 2019-01-07 NOTE — PROGRESS NOTES
Assessment/Plan:  1  Primary osteoarthritis of left knee         Scribe Attestation    I,:   Amy Matthew am acting as a scribe while in the presence of the attending physician :        I,:   Bambi Garza, DO personally performed the services described in this documentation    as scribed in my presence :            Irene Rosa has osteoarthritis of the left knee  We discussed that he most likely aggravated the arthritis in his knee when he twisted and that there are a couple of different treatment options we could proceed with  He was offered a cortisone injection and we discussed Efluexxa injections as well  Because he is improving and feeling better he would like to continue with conservative treatment of ice and rest  He will follow up with me as needed  Subjective:   Benton Hairston is a 80 y o  male who presents to the office today for left sided knee pain  He states about 4-5 weeks ago he started to feel knee pain  He thinks he may have twisted his knee but is unsure  He went to his PCP who ordered x-rays which showed osteoarthritis  He was instructed to follow up in our office  At today's appointment he states he feels better and his pain is improving  He is now having pain with walking long distances and feels wobbly without his cane  He denies any radiating pain, numbness or tingling at today's visit           Review of Systems   Constitutional: Negative for chills, fever and unexpected weight change  HENT: Positive for hearing loss  Negative for nosebleeds and sore throat  Eyes: Negative for pain, redness and visual disturbance  Respiratory: Positive for cough and shortness of breath  Negative for wheezing  Cardiovascular: Negative for chest pain, palpitations and leg swelling  Gastrointestinal: Negative for abdominal pain, nausea and vomiting  Endocrine: Positive for polyuria  Negative for polyphagia  Genitourinary: Negative for dysuria and hematuria     Musculoskeletal: Positive for arthralgias, joint swelling and myalgias  See HPI   Skin: Negative for rash and wound  Neurological: Negative for dizziness, numbness and headaches  Psychiatric/Behavioral: Negative for decreased concentration and suicidal ideas  The patient is not nervous/anxious  Past Medical History:   Diagnosis Date    CAD (coronary artery disease)     Cardiomyopathy ([facility] )     Dupuytren contracture     Dyslipidemia     Essential hypertension     ICD (implantable cardioverter-defibrillator) in place     Lumbosacral radiculopathy at S1     Osteoarthritis     Rheumatoid arthritis (HCC)     SNHL (sensorineural hearing loss)     Spinal stenosis     Type 2 diabetes mellitus ([facility] )        Past Surgical History:   Procedure Laterality Date    AORTIC VALVE REPLACEMENT      CHOLECYSTECTOMY         Family History   Problem Relation Age of Onset    Heart disease Brother     Cancer Brother     COPD Father     Diabetes Mother        Social History     Occupational History    Not on file       Social History Main Topics    Smoking status: Former Smoker     Quit date: 1995    Smokeless tobacco: Never Used    Alcohol use No    Drug use: No    Sexual activity: Not on file         Current Outpatient Prescriptions:     albuterol (PROVENTIL HFA,VENTOLIN HFA) 90 mcg/act inhaler, Inhale 2 puffs, Disp: , Rfl:     amLODIPine (NORVASC) 10 mg tablet, take 1/2  tablet daily, Disp: , Rfl:     benazepril (LOTENSIN) 5 mg tablet, Take 5 mg by mouth, Disp: , Rfl:     calcium carbonate-vitamin D (OSCAL-D) 500 mg-200 units per tablet, Take 1 tablet by mouth 2 (two) times a day with meals, Disp: 60 tablet, Rfl: 11    ergocalciferol (VITAMIN D2) 50,000 units, Take 1 capsule (50,000 Units total) by mouth once a week, Disp: 4 capsule, Rfl: 0    fluticasone (FLONASE) 50 mcg/act nasal spray, 1 spray into each nostril daily, Disp: , Rfl:     folic acid (FOLVITE) 1 mg tablet, Take 1 mg by mouth, Disp: , Rfl:    gabapentin (NEURONTIN) 100 mg capsule, Take 100 mg by mouth, Disp: , Rfl:     glucose blood (ONE TOUCH ULTRA TEST) test strip, 1 each by Other route daily Use as instructed, Disp: 100 each, Rfl: 3    glucose blood test strip, 1 each by Other route daily Use as instructed, Disp: 100 each, Rfl: 1    hydroxychloroquine (PLAQUENIL) 200 mg tablet, , Disp: , Rfl: 0    Lancet Devices (ONE TOUCH DELICA LANCING DEV) MISC, Check sugar daily and as needed  Dx: E11 9, Disp: , Rfl:     methotrexate (RHEUMATREX) 2 5 MG tablet, Take 4 tablets (10 mg total) by mouth once a week, Disp: 16 tablet, Rfl: 2    methotrexate 2 5 mg tablet, , Disp: , Rfl: 0    metoprolol succinate (TOPROL XL) 50 mg 24 hr tablet, Take 50 mg by mouth, Disp: , Rfl:     ONETOUCH DELICA LANCETS 54I MISC, Check sugar daily and as needed  Dx: E11 9, Disp: , Rfl:     potassium chloride (K-DUR,KLOR-CON) 10 mEq tablet, Take 10 mEq by mouth, Disp: , Rfl:     PredniSONE 5 MG TBEC, Take 1 tablet (5 mg total) by mouth daily, Disp: 30 tablet, Rfl: 5    rosuvastatin (CRESTOR) 10 MG tablet, 1 by mouth daily, Disp: , Rfl:     warfarin (COUMADIN) 2 mg tablet, 2 mg alternating with 3 mg daily or as directed, Disp: 90 tablet, Rfl: 1    warfarin (COUMADIN) 3 mg tablet, TAKE 1 TABLET BY MOUTH ON MONDAY AND FRIDAY, Disp: , Rfl: 0    warfarin (COUMADIN) 4 mg tablet, TAKE 1 TABLET BY MOUTH 5 DAYS A WEEK, Disp: , Rfl: 0    hydroxychloroquine (PLAQUENIL) 200 mg tablet, Take 1 tablet (200 mg total) by mouth 2 (two) times a day with meals for 30 days, Disp: 60 tablet, Rfl: 5    No Known Allergies    Objective:  Vitals:    01/07/19 0855   BP: 133/74   Pulse: 68       Right Knee Exam     Tenderness   The patient is experiencing no tenderness  Range of Motion   The patient has normal right knee ROM  Muscle Strength     The patient has normal right knee strength      Other   Swelling: none    Comments:  5/5 great toe strength      Left Knee Exam     Tenderness The patient is experiencing tenderness in the medial joint line, lateral joint line and patellar tendon  Range of Motion   The patient has normal left knee ROM  Muscle Strength     The patient has normal left knee strength  Tests   Kajal:  Medial - negative Lateral - negative    Other   Sensation: normal  Swelling: mild  Effusion: no effusion present    Comments:  5/5 great toe strength          Observations   Left Knee   Negative for effusion  Physical Exam   Constitutional: He is oriented to person, place, and time  He appears well-developed  HENT:   Head: Normocephalic and atraumatic  Eyes: Conjunctivae are normal    Neck: Neck supple  Cardiovascular: Intact distal pulses  Pulmonary/Chest: Effort normal  No respiratory distress  Musculoskeletal:        Left knee: He exhibits no effusion  Neurological: He is alert and oriented to person, place, and time  Skin: Skin is warm and dry  Psychiatric: He has a normal mood and affect  His behavior is normal    Vitals reviewed  I have personally reviewed pertinent films in PACS and my interpretation is as follows: Four view left knee x-ray taken on 12/19/2018 shows severe degenerative changes  No acute fracture or dislocation is present

## 2019-01-08 ENCOUNTER — OFFICE VISIT (OUTPATIENT)
Dept: CARDIOLOGY CLINIC | Facility: CLINIC | Age: 84
End: 2019-01-08
Payer: MEDICARE

## 2019-01-08 VITALS
BODY MASS INDEX: 24.2 KG/M2 | HEART RATE: 62 BPM | DIASTOLIC BLOOD PRESSURE: 64 MMHG | OXYGEN SATURATION: 98 % | WEIGHT: 169 LBS | SYSTOLIC BLOOD PRESSURE: 130 MMHG | HEIGHT: 70 IN

## 2019-01-08 DIAGNOSIS — I48.0 PAROXYSMAL ATRIAL FIBRILLATION (HCC): Primary | ICD-10-CM

## 2019-01-08 DIAGNOSIS — I44.2 COMPLETE HEART BLOCK (HCC): ICD-10-CM

## 2019-01-08 DIAGNOSIS — I10 ESSENTIAL HYPERTENSION: ICD-10-CM

## 2019-01-08 DIAGNOSIS — I25.10 CAD IN NATIVE ARTERY: ICD-10-CM

## 2019-01-08 DIAGNOSIS — I45.10 RBBB: ICD-10-CM

## 2019-01-08 DIAGNOSIS — R06.00 DYSPNEA ON EXERTION: ICD-10-CM

## 2019-01-08 DIAGNOSIS — Z95.2 S/P AVR: ICD-10-CM

## 2019-01-08 PROCEDURE — 99214 OFFICE O/P EST MOD 30 MIN: CPT | Performed by: INTERNAL MEDICINE

## 2019-01-08 NOTE — PROGRESS NOTES
Cardiology Followup    Amita Romero  [id number]  1934  [address] [place] [id number]    Interval History: Amita Romero is a 80y o  year old male who is here to follow up on recent testing  Patient underwent stress test and 2D echocardiogram because he has a history of coronary artery disease with previous CABG and aortic valve replacement  He was having shortness of breath  Stress test was normal without ischemia or infarction noted  Echocardiogram showed ejection fraction of 01%, grade 1 diastolic dysfunction, normal functioning bioprosthetic aortic valve with a mean gradient of 6 mm Hg  He was previously seeing a cardiologist at [facility]  Previous records were reviewed through the Care everywhere system  According to the patient, he had a myocardial infarction in 1995  Shortly afterwards, he had a stent placed to an unknown vessel  In December 2011, he had an aortic valve replacement (25 mm trifecta tissue valve) and may have had bypass at that time as he had vein removed from his left leg  Procedure was complicated by complete heart block and he had pacemaker inserted at that time  Pacemaker was upgraded to a biventricular ICD because of reduction of ejection fraction after RV pacing  Subsequent ejection fraction improved  Patient is on Coumadin  for paroxysmal atrial fibrillation but denies history of palpitations (does not recall ever having atrial fibrillation)  Echocardiogram in March 2017 showed ejection fraction of 50-55% mild mitral regurgitation and normal aortic valve gradient  He has shortness of breath with exertion but denies any chest pain  He denies LE edema, orthopnea or PND        Past Medical History:   Diagnosis Date    CAD (coronary artery disease)     Cardiomyopathy ([address] )     Dupuytren contracture     Dyslipidemia     Essential hypertension     ICD (implantable cardioverter-defibrillator) in place     Lumbosacral radiculopathy at S1     Osteoarthritis     [facility]     SNHL (sensorineural hearing loss)     Spinal stenosis     Type 2 diabetes mellitus ([facility] )      Social History     Social History    Marital status: /Civil Union     Spouse name: N/A    Number of children: N/A    Years of education: N/A     Occupational History    Not on file       Social History Main Topics    Smoking status: Former Smoker     Quit date: 1995    Smokeless tobacco: Never Used    Alcohol use No    Drug use: No    Sexual activity: Not on file     Other Topics Concern    Not on file     Social History Narrative    No narrative on file      Family History   Problem Relation Age of Onset    Heart disease Brother     Cancer Brother     COPD Father     Diabetes Mother      Past Surgical History:   Procedure Laterality Date    AORTIC VALVE REPLACEMENT      CHOLECYSTECTOMY         Current Outpatient Prescriptions:     albuterol (PROVENTIL HFA,VENTOLIN HFA) 90 mcg/act inhaler, Inhale 2 puffs, Disp: , Rfl:     amLODIPine (NORVASC) 10 mg tablet, take 1/2  tablet daily, Disp: , Rfl:     benazepril (LOTENSIN) 5 mg tablet, Take 5 mg by mouth, Disp: , Rfl:     calcium carbonate-vitamin D (OSCAL-D) 500 mg-200 units per tablet, Take 1 tablet by mouth 2 (two) times a day with meals, Disp: 60 tablet, Rfl: 11    ergocalciferol (VITAMIN D2) 50,000 units, Take 1 capsule (50,000 Units total) by mouth once a week, Disp: 4 capsule, Rfl: 0    fluticasone (FLONASE) 50 mcg/act nasal spray, 1 spray into each nostril daily, Disp: , Rfl:     folic acid (FOLVITE) 1 mg tablet, Take 1 mg by mouth, Disp: , Rfl:     gabapentin (NEURONTIN) 100 mg capsule, Take 100 mg by mouth, Disp: , Rfl:     glucose blood (ONE TOUCH ULTRA TEST) test strip, 1 each by Other route daily Use as instructed, Disp: 100 each, Rfl: 3    glucose blood test strip, 1 each by Other route daily Use as instructed, Disp: 100 each, Rfl: 1    hydroxychloroquine (PLAQUENIL) 200 mg tablet, Take 1 tablet (200 mg total) by mouth 2 (two) times a day with meals for 30 days, Disp: 60 tablet, Rfl: 5    Lancet Devices (ONE TOUCH DELICA LANCING DEV) MISC, Check sugar daily and as needed  Dx: E11 9, Disp: , Rfl:     methotrexate (RHEUMATREX) 2 5 MG tablet, Take 4 tablets (10 mg total) by mouth once a week, Disp: 16 tablet, Rfl: 2    metoprolol succinate (TOPROL XL) 50 mg 24 hr tablet, Take 50 mg by mouth, Disp: , Rfl:     ONETOUCH DELICA LANCETS 82P MISC, Check sugar daily and as needed  Dx: E11 9, Disp: , Rfl:     potassium chloride (K-DUR,KLOR-CON) 10 mEq tablet, Take 10 mEq by mouth, Disp: , Rfl:     PredniSONE 5 MG TBEC, Take 1 tablet (5 mg total) by mouth daily, Disp: 30 tablet, Rfl: 5    rosuvastatin (CRESTOR) 10 MG tablet, 1 by mouth daily, Disp: , Rfl:     warfarin (COUMADIN) 2 mg tablet, 2 mg alternating with 3 mg daily or as directed, Disp: 90 tablet, Rfl: 1    warfarin (COUMADIN) 3 mg tablet, TAKE 1 TABLET BY MOUTH ON MONDAY AND FRIDAY, Disp: , Rfl: 0    warfarin (COUMADIN) 4 mg tablet, TAKE 1 TABLET BY MOUTH 5 DAYS A WEEK, Disp: , Rfl: 0  No Known Allergies      Review of Systems:  Review of Systems   Constitutional: Negative for chills, fatigue and fever  HENT: Negative for congestion, nosebleeds and postnasal drip  Respiratory: Positive for shortness of breath  Negative for cough and chest tightness  Cardiovascular: Negative for chest pain, palpitations and leg swelling  Gastrointestinal: Negative for abdominal distention, abdominal pain, diarrhea, nausea and vomiting  Endocrine: Negative for polydipsia, polyphagia and polyuria  Musculoskeletal: Negative for gait problem and myalgias  Skin: Negative for color change, pallor and rash  Allergic/Immunologic: Negative for environmental allergies, food allergies and immunocompromised state     Neurological: Negative for dizziness, seizures, syncope and light-headedness  Hematological: Negative for adenopathy  Does not bruise/bleed easily  Psychiatric/Behavioral: Negative for dysphoric mood  The patient is not nervous/anxious  Physical Exam:  Vitals:    01/08/19 0829   BP: 130/64   BP Location: Left arm   Patient Position: Sitting   Cuff Size: Large   Pulse: 62   SpO2: 98%   Weight: 76 7 kg (169 lb)   Height: 5' 10" (1 778 m)     Physical Exam   Constitutional: He is oriented to person, place, and time  He appears well-developed  No distress  HENT:   Head: Normocephalic and atraumatic  Eyes: Pupils are equal, round, and reactive to light  Conjunctivae and EOM are normal    Neck: Neck supple  No JVD present  No thyromegaly present  Cardiovascular: Normal rate and regular rhythm  Exam reveals no gallop and no friction rub  Murmur heard  Pulmonary/Chest: Effort normal and breath sounds normal    Abdominal: Soft  He exhibits no distension  There is no tenderness  Musculoskeletal: He exhibits no edema  Neurological: He is alert and oriented to person, place, and time  No cranial nerve deficit  Skin: Skin is warm and dry  No rash noted  He is not diaphoretic  No erythema  Psychiatric: He has a normal mood and affect  His behavior is normal  Judgment and thought content normal        Labs:  Appointment on 01/02/2019   Component Date Value    Protime 01/02/2019 15 7*    INR 01/02/2019 1 24*   Appointment on 12/31/2018   Component Date Value    OCCULT BLD, FECAL IMMUNO* 12/31/2018 Negative    Hospital Outpatient Visit on 12/28/2018   Component Date Value    Protocol Name 12/28/2018 LEXISCAN     Time In Exercise Phase 12/28/2018 00:01:00     MAX   SYSTOLIC BP 82/02/0113 780     Max Diastolic Bp 48/36/5379 86     Max Heart Rate 12/28/2018 90     Max Predicted Heart Rate 12/28/2018 136     Reason for Termination 12/28/2018 PROTOCOL COMPLETED     Test Indication 12/28/2018 Dyspnea     Target Hr Formular 12/28/2018 (220 - Age)*100%     Chest Pain Statement 12/28/2018 none    Appointment on 12/26/2018   Component Date Value    Sodium 12/26/2018 141     Potassium 12/26/2018 4 1     Chloride 12/26/2018 107     CO2 12/26/2018 27     ANION GAP 12/26/2018 7     BUN 12/26/2018 12     Creatinine 12/26/2018 0 58*    Glucose, Fasting 12/26/2018 94     Calcium 12/26/2018 8 5     AST 12/26/2018 18     ALT 12/26/2018 12     Alkaline Phosphatase 12/26/2018 107     Total Protein 12/26/2018 7 4     Albumin 12/26/2018 3 1*    Total Bilirubin 12/26/2018 0 61     eGFR 12/26/2018 94     Hemoglobin A1C 12/26/2018 6 3     EAG 12/26/2018 134     TSH 3RD GENERATON 12/26/2018 0 558     PSA 12/26/2018 0 5     Free T4 12/26/2018 1 27    Appointment on 12/24/2018   Component Date Value    Protime 12/24/2018 21 4*    INR 12/24/2018 1 85*   Anticoag visit on 12/22/2018   Component Date Value    INR 12/24/2018 1 85*   Office Visit on 12/19/2018   Component Date Value    Creatinine, Ur 12/26/2018 111 0     Microalbum  ,U,Random 12/26/2018 26 5*    Microalb Creat Ratio 12/26/2018 24    Appointment on 12/12/2018   Component Date Value    Protime 12/12/2018 23 8*    INR 12/12/2018 2 12*   Appointment on 12/11/2018   Component Date Value    Magnesium 12/11/2018 2 2     Phosphorus 12/11/2018 3 0     Vit D, 25-Hydroxy 12/11/2018 19 7*   Appointment on 11/13/2018   Component Date Value    Sodium 11/13/2018 136     Potassium 11/13/2018 3 9     Chloride 11/13/2018 102     CO2 11/13/2018 29     ANION GAP 11/13/2018 5     BUN 11/13/2018 8     Creatinine 11/13/2018 0 61     Glucose 11/13/2018 92     Calcium 11/13/2018 8 5     AST 11/13/2018 24     ALT 11/13/2018 19     Alkaline Phosphatase 11/13/2018 115     Total Protein 11/13/2018 7 4     Albumin 11/13/2018 3 5     Total Bilirubin 11/13/2018 0 66     eGFR 11/13/2018 92     WBC 11/13/2018 9 56     RBC 11/13/2018 4 77     Hemoglobin 11/13/2018 12 3     Hematocrit 11/13/2018 40 6     MCV 11/13/2018 85     MCH 11/13/2018 25 8*    MCHC 11/13/2018 30 3*    RDW 11/13/2018 16 8*    MPV 11/13/2018 9 7     Platelets 72/03/1868 271     nRBC 11/13/2018 0     Neutrophils Relative 11/13/2018 76*    Immat GRANS % 11/13/2018 0     Lymphocytes Relative 11/13/2018 16     Monocytes Relative 11/13/2018 6     Eosinophils Relative 11/13/2018 1     Basophils Relative 11/13/2018 1     Neutrophils Absolute 11/13/2018 7 23     Immature Grans Absolute 11/13/2018 0 03     Lymphocytes Absolute 11/13/2018 1 54     Monocytes Absolute 11/13/2018 0 57     Eosinophils Absolute 11/13/2018 0 11     Basophils Absolute 11/13/2018 0 08     CRP 11/13/2018 4 6*    Sed Rate 11/13/2018 16*   There may be more visits with results that are not included  Imaging: Xr Chest Pa & Lateral    Result Date: 11/15/2018  Narrative: CHEST INDICATION:   [id number]: Other long term (current) drug therapy  COMPARISON:  None EXAM PERFORMED/VIEWS:  XR CHEST PA & LATERAL Images: 3 FINDINGS:  Biventricular pacemaker seen Postsurgical changes from prior median sternotomy No acute consolidation congestion Osseous structures appear within normal limits for patient age  Impression: No acute consolidation or congestion Workstation performed: [id number]     Dxa Bone Density Spine Hip And Pelvis    Result Date: 11/29/2018  Narrative: CENTRAL  DXA SCAN CLINICAL HISTORY:   80year old  male risk factors include previous smoking  Rheumatoid arthritis  Osteoporosis screening  TECHNIQUE: Bone densitometry was performed using a [employer] bone densitometer  Regions of interest appear properly placed  There are no obvious fractures or other confounding variables which could limit the study  Degenerative changes of the lumbar spine and hip  This will falsely elevate the bone mineral densities in these regions  COMPARISON:  None   RESULTS: LUMBAR SPINE:  L1-L4: BMD 1 415 gm/cm2 T-score 1 4 Z-score 2 2 LEFT TOTAL HIP: BMD 0 730 gm/cm2 T-score -2 6 Z-score -1 3 LEFT FEMORAL NECK: BMD 0 731 gm/cm2 T-score -2 6 Z-score -0 9 TOTAL RIGHT HIP: BMD 0 741 gm/sq-cm, T-score is -2 5 Z-score is -1 2                  FEMORAL NECK RIGHT HIP : BMD 0 805 gm/sq-cm, T-score is -2 0 Z-score is -0 4     Impression: 1  Based on the [facility] classification, the T-score of -2 6 in the left hip is consistent with OSTEOPOROSIS  2   According to the [address] [place] [address], prescription therapy is recommended with a T-score of -2 5 or less in the spine or hip after appropriate evaluation to exclude secondary causes  3   A daily intake of at least 1200 mg Calcium and 800 to 1000 IU of Vitamin D, as well as weight bearing and muscle strengthening exercise, fall prevention and avoidance of tobacco and excessive alcohol intake as  basic preventive measures are suggested  4   Repeat DXA  in 18 - 24 months, on the same machine, as clinically indicated  The 10 year risk of hip fracture is 11%, with the 10 year risk of major osteoporotic fracture being 20%, as calculated by the [facility] fracture risk assessment tool (FRAX)  The current NOF guidelines recommend treating patients with FRAX 10 year risk score of >3% for hip fracture and >20% for major osteoporotic fracture  WHO CLASSIFICATION: Normal (a T-score of -1 0 or higher) Low bone mineral density (a T-score of less than -1 0 but higher than -2 5) Osteoporosis (a T-score of -2 5 or less) Severe osteoporosis (a T-score of -2 5 or less with a fragility fracture)   Workstation performed: [id number]     EKG (independently reviewed):  Sinus rhythm at 55 beats per minute with nonspecific interventricular conduction delay    Discussion/Summary:  1  Dyspnea on exertion  - Patient's cardiac testing was reviewed with him and his wife  Normal LV function and valve function seen        2  Coronary artery disease involving native coronary artery of native heart without angina pectoris  - ASA  - No ischemia or infarction present on stress test    3  Essential hypertension  - BP well controlled on current Rx     4  S/P AVR (aortic valve replacement)  - Normal gradients on echocardiogram    5  Paroxysmal atrial fibrillation  - on coumadin therapy - will refer to coumadin nurses for management  6  SSS s/p pacemaker  - will establish care in pacemaker clinic

## 2019-01-09 ENCOUNTER — APPOINTMENT (OUTPATIENT)
Dept: LAB | Facility: CLINIC | Age: 84
End: 2019-01-09
Payer: MEDICARE

## 2019-01-09 DIAGNOSIS — I48.0 PAROXYSMAL ATRIAL FIBRILLATION (HCC): ICD-10-CM

## 2019-01-09 LAB
INR PPP: 2.13 (ref 0.86–1.17)
PROTHROMBIN TIME: 23.9 SECONDS (ref 11.8–14.2)

## 2019-01-09 PROCEDURE — 85610 PROTHROMBIN TIME: CPT

## 2019-01-09 PROCEDURE — 93000 ELECTROCARDIOGRAM COMPLETE: CPT | Performed by: INTERNAL MEDICINE

## 2019-01-09 PROCEDURE — 36415 COLL VENOUS BLD VENIPUNCTURE: CPT

## 2019-01-10 ENCOUNTER — TELEPHONE (OUTPATIENT)
Dept: CARDIOLOGY CLINIC | Facility: CLINIC | Age: 84
End: 2019-01-10

## 2019-01-10 ENCOUNTER — ANTICOAG VISIT (OUTPATIENT)
Dept: FAMILY MEDICINE CLINIC | Facility: CLINIC | Age: 84
End: 2019-01-10

## 2019-01-10 DIAGNOSIS — I48.0 PAROXYSMAL ATRIAL FIBRILLATION (HCC): ICD-10-CM

## 2019-01-10 NOTE — TELEPHONE ENCOUNTER
Dr Christy Browning patients inrs are being managed by the family doctor  They called him yesterday with dosing instructions  Are we to take over?

## 2019-01-16 ENCOUNTER — APPOINTMENT (OUTPATIENT)
Dept: LAB | Facility: CLINIC | Age: 84
End: 2019-01-16
Payer: MEDICARE

## 2019-01-16 ENCOUNTER — ANTICOAG VISIT (OUTPATIENT)
Dept: FAMILY MEDICINE CLINIC | Facility: CLINIC | Age: 84
End: 2019-01-16

## 2019-01-16 DIAGNOSIS — I48.0 PAROXYSMAL ATRIAL FIBRILLATION (HCC): ICD-10-CM

## 2019-01-16 LAB
INR PPP: 2.34 (ref 0.86–1.17)
PROTHROMBIN TIME: 25.7 SECONDS (ref 11.8–14.2)

## 2019-01-16 PROCEDURE — 85610 PROTHROMBIN TIME: CPT

## 2019-01-16 PROCEDURE — 36415 COLL VENOUS BLD VENIPUNCTURE: CPT

## 2019-01-17 ENCOUNTER — OFFICE VISIT (OUTPATIENT)
Dept: PODIATRY | Facility: CLINIC | Age: 84
End: 2019-01-17
Payer: MEDICARE

## 2019-01-17 VITALS
SYSTOLIC BLOOD PRESSURE: 124 MMHG | HEIGHT: 70 IN | RESPIRATION RATE: 17 BRPM | BODY MASS INDEX: 24.2 KG/M2 | HEART RATE: 64 BPM | WEIGHT: 169 LBS | DIASTOLIC BLOOD PRESSURE: 69 MMHG

## 2019-01-17 DIAGNOSIS — M79.672 PAIN IN BOTH FEET: Primary | ICD-10-CM

## 2019-01-17 DIAGNOSIS — I70.209 PERIPHERAL ARTERIOSCLEROSIS (HCC): ICD-10-CM

## 2019-01-17 DIAGNOSIS — M79.671 PAIN IN BOTH FEET: Primary | ICD-10-CM

## 2019-01-17 DIAGNOSIS — B35.1 ONYCHOMYCOSIS: ICD-10-CM

## 2019-01-17 DIAGNOSIS — E11.42 DIABETIC POLYNEUROPATHY ASSOCIATED WITH TYPE 2 DIABETES MELLITUS (HCC): ICD-10-CM

## 2019-01-17 PROCEDURE — 11056 PARNG/CUTG B9 HYPRKR LES 2-4: CPT | Performed by: PODIATRIST

## 2019-01-17 NOTE — PROGRESS NOTES
Procedures   Foot Exam    Right Foot/Ankle     Inspection and Palpation  Skin Exam: dry skin; Left Foot/Ankle      Inspection and Palpation  Skin Exam: dry skin;              Signed  Encounter Date: 11/15/2018   Assessment/Plan:  Pain upon ambulation  Peripheral artery disease  Diabetic neuropathy  Mycosis of nail  Plan  Diabetic foot exam performed  All mycotic nails debrided without pain or complication      No problem-specific Assessment & Plan notes found for this encounter          There are no diagnoses linked to this encounter        Subjective:  patient complains of pain in his toes and feet with ambulation    No history of trauma          Past Medical History:   Diagnosis Date    CAD (coronary artery disease)      Cardiomyopathy [facility]      Dupuytren contracture      Dyslipidemia      Essential hypertension      ICD (implantable cardioverter-defibrillator) in place      Lumbosacral radiculopathy at S1      Osteoarthritis      Rheumatoid arthritis (HCC)      SNHL (sensorineural hearing loss)      Spinal stenosis      Type 2 diabetes mellitus (HCC)                 Past Surgical History:   Procedure Laterality Date    AORTIC VALVE REPLACEMENT        CHOLECYSTECTOMY             No Known Allergies        Current Outpatient Prescriptions:     albuterol (PROVENTIL HFA,VENTOLIN HFA) 90 mcg/act inhaler, Inhale 2 puffs, Disp: , Rfl:     amLODIPine (NORVASC) 10 mg tablet, take 1/2  tablet daily, Disp: , Rfl:     benazepril (LOTENSIN) 5 mg tablet, Take 5 mg by mouth, Disp: , Rfl:     fluticasone (FLONASE) 50 mcg/act nasal spray, 1 spray into each nostril daily, Disp: , Rfl:     folic acid (FOLVITE) 1 mg tablet, Take 1 mg by mouth, Disp: , Rfl:     gabapentin (NEURONTIN) 100 mg capsule, Take 100 mg by mouth, Disp: , Rfl:     glucose blood (ONE TOUCH ULTRA TEST) test strip, Check sugar daily and as needed  Dx: E11 9, Disp: , Rfl:     glucose blood test strip, Check sugar daily and as needed  Dx: E11 9, Disp: , Rfl:     hydroxychloroquine (PLAQUENIL) 200 mg tablet, Take 1 tablet (200 mg total) by mouth 2 (two) times a day with meals for 30 days, Disp: 60 tablet, Rfl: 5    Lancet Devices (ONE TOUCH DELICA LANCING DEV) MISC, Check sugar daily and as needed  Dx: E11 9, Disp: , Rfl:     methotrexate (RHEUMATREX) 2 5 MG tablet, Take 4 tablets (10 mg total) by mouth once a week, Disp: 16 tablet, Rfl: 2    metoprolol succinate (TOPROL XL) 50 mg 24 hr tablet, Take 50 mg by mouth, Disp: , Rfl:     ONETOUCH DELICA LANCETS 92M MISC, Check sugar daily and as needed  Dx: E11 9, Disp: , Rfl:     potassium chloride (K-DUR,KLOR-CON) 10 mEq tablet, Take 10 mEq by mouth, Disp: , Rfl:     PredniSONE 5 MG TBEC, Take 1 tablet (5 mg total) by mouth daily, Disp: 30 tablet, Rfl: 5    rosuvastatin (CRESTOR) 10 MG tablet, 1 by mouth daily, Disp: , Rfl:     warfarin (COUMADIN) 2 mg tablet, Take 2 mg on Mondays and Fridays and 4 mg all other days of the week or as directed, Disp: , Rfl:          Patient Active Problem List   Diagnosis    Patient refuses to disclose advance directive information    Bilateral sensorineural hearing loss    Biventricular ICD (implantable cardioverter-defibrillator) in place    Cardiomyopathy [facility]    CHB (complete heart block) ([facility] )    Coronary artery disease involving native coronary artery of native heart without angina pectoris    Dupuytren contracture    Dyslipidemia, goal LDL below 70    Hyperplasia of prostate with lower urinary tract symptoms (LUTS)    Former smoker    Essential hypertension    Lymphedema of left leg    Osteoarthritis of multiple joints    Pacemaker    Paroxysmal atrial fibrillation (HCC)    RA (rheumatoid arthritis) ([facility]    Restrictive lung disease    RBBB    S/P AVR (aortic valve replacement)    S/P primary angioplasty with coronary stent    Spinal stenosis of lumbar region without neurogenic claudication    Type 2 diabetes mellitus with hemoglobin A1c goal of less than 7 0% ([facility])    Abnormal TSH             Patient ID: Isidro Bernard is a 80 y o  male      HPI     The following portions of the patient's history were reviewed and updated as appropriate: allergies, current medications, past family history, past medical history, past social history, past surgical history and problem list      Review of Systems       Objective:  Patient's shoes and socks removed  Foot Exam     Right Foot/Ankle      Inspection and Palpation  Skin Exam: dry skin;      Neurovascular  Dorsalis pedis: 1+  Posterior tibial: 1+        Left Foot/Ankle       Inspection and Palpation  Skin Exam: dry skin;      Neurovascular  Dorsalis pedis: 1+  Posterior tibial: 1+        Physical Exam   Cardiovascular: Pulses are weak pulses  Pulses:       Dorsalis pedis pulses are 1+ on the right side, and 1+ on the left side  Posterior tibial pulses are 1+ on the right side, and 1+ on the left side  Feet:   Right Foot:   Skin Integrity: Positive for warmth and dry skin  Left Foot:   Skin Integrity: Positive for warmth and dry skin  Patient's shoes and socks removed  Right Foot/Ankle   Right Foot Inspection  Skin Exam: dry skin and warmth                           Toe Exam: swelling and erythema  Sensory   Vibration: diminished  Proprioception: diminished   Monofilament testing: intact  Vascular  Capillary refills: elevated  The right DP pulse is 1+  The right PT pulse is 1+       Left Foot/Ankle  Left Foot Inspection  Skin Exam: dry skin and warmth                                          Sensory   Vibration: diminished  Proprioception: diminished  Monofilament: intact  Vascular  Capillary refills: elevated  The left DP pulse is 1+  The left PT pulse is 1+  Patient's shoes and socks removed  Right Foot/Ankle   Right Foot Inspection  Skin Exam: dry skin                                  Left Foot/Ankle  Left Foot Inspection  Skin Exam: dry skin Assign Risk Category:  Deformity present;  Loss of protective sensation; Weak pulses       Risk: 2

## 2019-01-23 ENCOUNTER — APPOINTMENT (OUTPATIENT)
Dept: LAB | Facility: CLINIC | Age: 84
End: 2019-01-23
Payer: MEDICARE

## 2019-01-23 ENCOUNTER — ANTICOAG VISIT (OUTPATIENT)
Dept: FAMILY MEDICINE CLINIC | Facility: CLINIC | Age: 84
End: 2019-01-23

## 2019-01-23 DIAGNOSIS — I48.0 PAROXYSMAL ATRIAL FIBRILLATION (HCC): ICD-10-CM

## 2019-01-23 LAB
INR PPP: 2.67 (ref 0.86–1.17)
PROTHROMBIN TIME: 28.5 SECONDS (ref 11.8–14.2)

## 2019-01-23 PROCEDURE — 85610 PROTHROMBIN TIME: CPT

## 2019-01-23 PROCEDURE — 36415 COLL VENOUS BLD VENIPUNCTURE: CPT

## 2019-01-30 ENCOUNTER — TRANSCRIBE ORDERS (OUTPATIENT)
Dept: ADMINISTRATIVE | Facility: HOSPITAL | Age: 84
End: 2019-01-30

## 2019-01-30 ENCOUNTER — APPOINTMENT (OUTPATIENT)
Dept: LAB | Facility: CLINIC | Age: 84
End: 2019-01-30
Payer: MEDICARE

## 2019-01-30 ENCOUNTER — ANTICOAG VISIT (OUTPATIENT)
Dept: FAMILY MEDICINE CLINIC | Facility: CLINIC | Age: 84
End: 2019-01-30

## 2019-01-30 DIAGNOSIS — I48.0 PAROXYSMAL ATRIAL FIBRILLATION (HCC): ICD-10-CM

## 2019-01-30 DIAGNOSIS — I48.0 PAROXYSMAL ATRIAL FIBRILLATION (HCC): Primary | ICD-10-CM

## 2019-01-30 LAB
INR PPP: 3.15 (ref 0.86–1.17)
PROTHROMBIN TIME: 32.4 SECONDS (ref 11.8–14.2)

## 2019-01-30 PROCEDURE — 36415 COLL VENOUS BLD VENIPUNCTURE: CPT

## 2019-01-30 PROCEDURE — 85610 PROTHROMBIN TIME: CPT

## 2019-02-01 ENCOUNTER — PATIENT OUTREACH (OUTPATIENT)
Dept: FAMILY MEDICINE CLINIC | Facility: CLINIC | Age: 84
End: 2019-02-01

## 2019-02-05 ENCOUNTER — PATIENT OUTREACH (OUTPATIENT)
Dept: FAMILY MEDICINE CLINIC | Facility: CLINIC | Age: 84
End: 2019-02-05

## 2019-02-05 NOTE — PROGRESS NOTES
INR labs continue to be drawn on a regular basis  I will close out of complex care management  Heidi Bhumikajose refused my services when we last spoke   I am providing care management calls to his wife Meryl Singh as per PCP request

## 2019-02-06 ENCOUNTER — APPOINTMENT (OUTPATIENT)
Dept: LAB | Facility: CLINIC | Age: 84
End: 2019-02-06
Payer: MEDICARE

## 2019-02-06 DIAGNOSIS — I48.0 PAROXYSMAL ATRIAL FIBRILLATION (HCC): ICD-10-CM

## 2019-02-06 LAB
INR PPP: 2.73 (ref 0.86–1.17)
PROTHROMBIN TIME: 29 SECONDS (ref 11.8–14.2)

## 2019-02-06 PROCEDURE — 85610 PROTHROMBIN TIME: CPT

## 2019-02-06 PROCEDURE — 36415 COLL VENOUS BLD VENIPUNCTURE: CPT

## 2019-02-07 ENCOUNTER — ANTICOAG VISIT (OUTPATIENT)
Dept: FAMILY MEDICINE CLINIC | Facility: CLINIC | Age: 84
End: 2019-02-07

## 2019-02-07 ENCOUNTER — TELEPHONE (OUTPATIENT)
Dept: FAMILY MEDICINE CLINIC | Facility: CLINIC | Age: 84
End: 2019-02-07

## 2019-02-07 DIAGNOSIS — M79.671 PAIN IN BOTH FEET: ICD-10-CM

## 2019-02-07 DIAGNOSIS — M79.672 PAIN IN BOTH FEET: ICD-10-CM

## 2019-02-07 DIAGNOSIS — I48.0 PAROXYSMAL ATRIAL FIBRILLATION (HCC): ICD-10-CM

## 2019-02-07 DIAGNOSIS — I10 ESSENTIAL HYPERTENSION: Primary | ICD-10-CM

## 2019-02-07 RX ORDER — METOPROLOL SUCCINATE 50 MG/1
50 TABLET, EXTENDED RELEASE ORAL 2 TIMES DAILY
Qty: 180 TABLET | Refills: 1 | Status: SHIPPED | OUTPATIENT
Start: 2019-02-07 | End: 2019-12-30 | Stop reason: SDUPTHER

## 2019-02-07 RX ORDER — GABAPENTIN 100 MG/1
100 CAPSULE ORAL 2 TIMES DAILY
Qty: 180 CAPSULE | Refills: 1 | Status: SHIPPED | OUTPATIENT
Start: 2019-02-07 | End: 2019-11-18 | Stop reason: SDUPTHER

## 2019-02-07 RX ORDER — AMLODIPINE BESYLATE 10 MG/1
5 TABLET ORAL DAILY
Qty: 45 TABLET | Refills: 1 | Status: SHIPPED | OUTPATIENT
Start: 2019-02-07 | End: 2019-11-18 | Stop reason: SDUPTHER

## 2019-02-07 NOTE — TELEPHONE ENCOUNTER
[address]    Patient needs  Meoprolol, amlodipine and gabapentin refilled  Please send to [facility] aide in [place]

## 2019-02-13 ENCOUNTER — APPOINTMENT (OUTPATIENT)
Dept: LAB | Facility: CLINIC | Age: 84
End: 2019-02-13
Payer: MEDICARE

## 2019-02-13 ENCOUNTER — ANTICOAG VISIT (OUTPATIENT)
Dept: FAMILY MEDICINE CLINIC | Facility: CLINIC | Age: 84
End: 2019-02-13

## 2019-02-13 DIAGNOSIS — I48.0 PAROXYSMAL ATRIAL FIBRILLATION (HCC): ICD-10-CM

## 2019-02-13 LAB
INR PPP: 4.07 (ref 0.86–1.17)
PROTHROMBIN TIME: 39.5 SECONDS (ref 11.8–14.2)

## 2019-02-13 PROCEDURE — 85610 PROTHROMBIN TIME: CPT

## 2019-02-13 PROCEDURE — 36415 COLL VENOUS BLD VENIPUNCTURE: CPT

## 2019-02-14 ENCOUNTER — TELEPHONE (OUTPATIENT)
Dept: FAMILY MEDICINE CLINIC | Facility: CLINIC | Age: 84
End: 2019-02-14

## 2019-02-14 NOTE — TELEPHONE ENCOUNTER
Please call patient to notify him that he will need to go to the pharmacy for his second shingrix vaccine  He has Medicare and we can not administer the vaccine here per his insurance guidelines

## 2019-02-20 ENCOUNTER — APPOINTMENT (OUTPATIENT)
Dept: LAB | Facility: CLINIC | Age: 84
End: 2019-02-20
Payer: MEDICARE

## 2019-02-20 ENCOUNTER — ANTICOAG VISIT (OUTPATIENT)
Dept: FAMILY MEDICINE CLINIC | Facility: CLINIC | Age: 84
End: 2019-02-20

## 2019-02-20 DIAGNOSIS — I48.0 PAROXYSMAL ATRIAL FIBRILLATION (HCC): ICD-10-CM

## 2019-02-20 LAB
INR PPP: 3.38 (ref 0.86–1.17)
PROTHROMBIN TIME: 34.2 SECONDS (ref 11.8–14.2)

## 2019-02-20 PROCEDURE — 85610 PROTHROMBIN TIME: CPT

## 2019-02-20 PROCEDURE — 36415 COLL VENOUS BLD VENIPUNCTURE: CPT

## 2019-02-27 ENCOUNTER — APPOINTMENT (OUTPATIENT)
Dept: LAB | Facility: CLINIC | Age: 84
End: 2019-02-27
Payer: MEDICARE

## 2019-02-27 ENCOUNTER — ANTICOAG VISIT (OUTPATIENT)
Dept: FAMILY MEDICINE CLINIC | Facility: CLINIC | Age: 84
End: 2019-02-27

## 2019-02-27 DIAGNOSIS — I48.0 PAROXYSMAL ATRIAL FIBRILLATION (HCC): ICD-10-CM

## 2019-02-27 LAB
INR PPP: 3.55 (ref 0.86–1.17)
PROTHROMBIN TIME: 35.5 SECONDS (ref 11.8–14.2)

## 2019-02-27 PROCEDURE — 85610 PROTHROMBIN TIME: CPT

## 2019-02-27 PROCEDURE — 36415 COLL VENOUS BLD VENIPUNCTURE: CPT

## 2019-02-28 DIAGNOSIS — I70.209 PERIPHERAL ARTERIOSCLEROSIS (HCC): Primary | ICD-10-CM

## 2019-02-28 DIAGNOSIS — I48.0 PAROXYSMAL ATRIAL FIBRILLATION (HCC): ICD-10-CM

## 2019-02-28 RX ORDER — WARFARIN SODIUM 3 MG/1
3 TABLET ORAL
Qty: 90 TABLET | Refills: 3 | Status: SHIPPED | OUTPATIENT
Start: 2019-02-28 | End: 2019-06-14 | Stop reason: SDUPTHER

## 2019-03-06 ENCOUNTER — TRANSCRIBE ORDERS (OUTPATIENT)
Dept: ADMINISTRATIVE | Facility: HOSPITAL | Age: 84
End: 2019-03-06

## 2019-03-06 ENCOUNTER — APPOINTMENT (OUTPATIENT)
Dept: LAB | Facility: CLINIC | Age: 84
End: 2019-03-06
Payer: MEDICARE

## 2019-03-06 DIAGNOSIS — I48.0 PAROXYSMAL ATRIAL FIBRILLATION (HCC): ICD-10-CM

## 2019-03-06 DIAGNOSIS — I48.0 PAROXYSMAL ATRIAL FIBRILLATION (HCC): Primary | ICD-10-CM

## 2019-03-06 LAB
INR PPP: 3.46 (ref 0.86–1.17)
PROTHROMBIN TIME: 34.8 SECONDS (ref 11.8–14.2)

## 2019-03-06 PROCEDURE — 36415 COLL VENOUS BLD VENIPUNCTURE: CPT

## 2019-03-06 PROCEDURE — 85610 PROTHROMBIN TIME: CPT

## 2019-03-07 ENCOUNTER — ANTICOAG VISIT (OUTPATIENT)
Dept: FAMILY MEDICINE CLINIC | Facility: CLINIC | Age: 84
End: 2019-03-07

## 2019-03-07 DIAGNOSIS — I48.0 PAROXYSMAL ATRIAL FIBRILLATION (HCC): ICD-10-CM

## 2019-03-12 ENCOUNTER — OFFICE VISIT (OUTPATIENT)
Dept: RHEUMATOLOGY | Facility: CLINIC | Age: 84
End: 2019-03-12
Payer: MEDICARE

## 2019-03-12 VITALS
BODY MASS INDEX: 23.77 KG/M2 | SYSTOLIC BLOOD PRESSURE: 129 MMHG | WEIGHT: 166 LBS | DIASTOLIC BLOOD PRESSURE: 77 MMHG | HEIGHT: 70 IN | HEART RATE: 80 BPM

## 2019-03-12 DIAGNOSIS — M05.9 SEROPOSITIVE RHEUMATOID ARTHRITIS (HCC): Primary | ICD-10-CM

## 2019-03-12 DIAGNOSIS — M15.0 PRIMARY GENERALIZED (OSTEO)ARTHRITIS: ICD-10-CM

## 2019-03-12 DIAGNOSIS — M81.0 AGE-RELATED OSTEOPOROSIS WITHOUT CURRENT PATHOLOGICAL FRACTURE: ICD-10-CM

## 2019-03-12 PROCEDURE — 99213 OFFICE O/P EST LOW 20 MIN: CPT | Performed by: INTERNAL MEDICINE

## 2019-03-12 NOTE — PROGRESS NOTES
Assessment and Plan:   Mr Debbie Michel is an 59-year-old  male with history significant for seropositive rheumatoid arthritis (positive rheumatoid factor and CCP antibody) and osteoporosis who presents for follow-up  He is currently on hydroxychloroquine 200 mg b i d       # Seropositive rheumatoid arthritis, currently with mild activity, with evidence of chronic synovial hypertrophy and joint deformities  - Silverio Gonzáles is seen today in follow-up today and reports since the prior office visit he self discontinued the methotrexate and prednisone, but is unsure of the reason why  He is currently on hydroxychloroquine 200 mg twice daily, and has not noticed a significant flare-up in his joint pains  In view of this I advised him to continue the hydroxychloroquine, and remain off the methotrexate and prednisone  If he is to notice a recurrence of his arthralgic complaints I will plan to restart the methotrexate  - In view of the hydroxychloroquine he is aware to get at least annual eye exams done  - I will plan to see him back in the office in 3 months, but advised him to call me he has any concerns or flare-up in symptoms      # Osteoporosis  - Prolia 60 mg was administered on 12/19/2018 and tolerated well  Next dose due on 6/19/2019  # Left knee osteoarthritis  - He is most symptomatic at his left knee, which appears to be secondary to advanced degenerative arthritis  He was seen by Sports Medicine and declined intra-articular cortisone injections or Euflexxa, but may consider a total knee replacement in the future if he is deemed to be a surgical candidate  He would like to hold off on a referral to orthopedic surgery for now, but will call me if he would like to pursue this option        Plan:  Diagnoses and all orders for this visit:    Seropositive rheumatoid arthritis ([address] )    Age-related osteoporosis without current pathological fracture    Primary generalized (osteo)arthritis      Activities as tolerated    Diet: low carb/low fat, more greens/vegetables, adequate hydration  Exercise: try to maintain a low impact exercise regimen as much as possible  Walk for 30 minutes a day for at least 3 days a week    Encouraged to maintain good sleep hygiene  Continue other medications as prescribed by PCP and other specialists        RTC in 3 months  HPI    INITIAL VISIT NOTE:  Mr Kayla Ta is an 80-year-old male with history significant for seropositive rheumatoid arthritis (positive RF and CCP) who presents for initial evaluation and establishing with Rheumatology       Patient and his wife are present at today's visit and although they are both very coherent, there appears to be a slight lapse in recalling patients prior history  History today is obtained from patient, his wife, and also from review of his prior rheumatology records      He was diagnosed with seropositive rheumatoid arthritis about 15 years ago when he developed diffuse joint pains  He was first seen by Dr Graeme Awad (private practice rheumatologist) and was started on Hydroxychloroquine 400 mg daily along with low dose steroids, and it appears he was maintained on that regimen till about 2016, when he discontinued the medications as his symptoms had resolved  He remained mostly asymptomatic until earlier this year when he again experienced diffuse joint pains and swelling, but predominantly in his hands, wrists, shoulders and knees  Per the notes from his most recent rheumatologist, Dr Deetta Merlin from [facility], patient was restarted on Plaquenil and low dose prednisone, but patient is unaware of this and says he was not taking the medications  There was also conversation regarding step-up therapy to TNF-inhibitors, but unfortunately due to the cost Humira or Enbrel were never started   Overall, as per patient he is currently not on any medications for the rheumatoid arthritis      Currently, he states continued aching pain in his joints, mostly affecting his bilateral shoulders, left wrist, bilateral hands and left knee  He has swelling of his fingers, left wrist and knee as well  Morning stiffness lasts a few hours and slightly eases up but does not resolve completely  He denies fevers, sicca symptoms, mouth/nose ulcers, swollen glands, skin rash, abdominal pain, N/V/D or blood in stools  He does not get recurrent infections   A quantiferon checked in 7/2018 was negative          11/13/2018:  Patient is seen for follow-up today in view of seropositive rheumatoid arthritis with chronic deformities   He is currently on methotrexate 4 tablets weekly with folic acid 1 mg daily, hydroxychloroquine 200 mg b i d  and prednisone 5 mg daily      This combination of medications was started following his initial visit with me on 09/11, and he states there has been a significant improvement in his overall joint pains since starting this regimen   He noticed the improvement in the first few days, and states he only experiences occasional pain in his left hand 3rd and 4th digits, but otherwise denies any joint pains at this time   A few days ago he did have a twisting injury of his left knee which resulted in an occasional pain, but that is also improving   He does have chronic numbness down his left lower extremity   He denies any acutely swollen joints and states morning stiffness has also improved   He is otherwise tolerating the medications well without any noticeable side effects such as fevers, chills, recurrent infections, mouth or nose ulcers or GI upset   Of note he does give a history of a chronic dry cough for the past 1 year which is associated with a runny nose, and this has previously been attributed to allergies   He has not had any recent chest x-rays done  Joseph Jayesh has chronic shortness of breath on exertion which is unchanged   No chest pain   No other complaints at this time         12/11/2018:  Patient presents for follow-up today to discuss results of his bone density exam      He had a recent DEXA scan done on the 29th of November which showed osteoporosis and a T-score of -2 6 in the left hip  There is a 10 year risk of hip fracture being 11%, with a 10 year risk of major osteoporotic fracture being 20%  Patient denies any history of recent obvious fractures  He is willing to start medications for the osteoporosis  He is currently not taking calcium or vitamin-D supplements      In terms of the rheumatoid arthritis he has been doing well on a combination of methotrexate 4 tablets weekly, with hydroxychloroquine 200 mg b i d  and prednisone 5 mg daily  He denies any significant joint pains or swelling at today's visit  We did also review his labs following the prior visit which revealed improvement of his inflammatory markers, as well as an unremarkable CBC and CMP     He does report a head injury he sustained earlier today, and has a bruise at that site  He denies any headaches, dizziness or changes in his vision  No other acute complaints at today's visit  3/12/2019:  Patient presents for follow-up of seropositive rheumatoid arthritis  He is currently on hydroxychloroquine 200 mg twice daily  He states since the prior office visit he has discontinued the methotrexate and prednisone, but cannot recall when this was done  We had not discussed discontinuing these medications  He states overall his joint pains have remained stable, but unfortunately 1 and half weeks ago he accidentally temporarily discontinued the Plaquenil and did notice a slight flare-up in joint pains  He has since restarted the hydroxychloroquine and except for ongoing pain in his left knee, denies any significant flare-up in joint pains  He has not noticed any swollen joints  He does continue to experience morning stiffness which lasts less than 1 hour, but does report gelling phenomenon    In view of the left knee pain he was seen by Orthopedics (Dr Bailee Millard) and offered intra-articular cortisone injections or Euflexxa, but patient would like to hold off for now  He may also consider a knee replacement in the future, but would like to hold off on seeing Orthopedic surgery for now  No other complaints noted at this time  The following portions of the patient's history were reviewed and updated as appropriate: allergies, current medications, past family history, past medical history, past social history, past surgical history and problem list       Review of Systems  Constitutional: Negative for fevers, chills, night sweats, fatigue  Positive for weight loss  ENT/Mouth: Negative for ear pain, nasal congestion, sinus pain, hoarseness, sore throat, rhinorrhea, swallowing difficulty  Positive for loss of hearing  Eyes: Negative for pain, redness, discharge, vision changes  Cardiovascular: Negative for palpitations  Positive for intermittent chest pain  Respiratory: Negative for sputum, wheezing  Positive for cough and shortness of breath  Gastrointestinal: Negative for nausea, vomiting, diarrhea, constipation, pain, heartburn  Genitourinary: Negative for dysuria, hematuria  Positive for urinary frequency  Musculoskeletal: As per HPI  Skin: Negative for skin rash, color changes  Neuro: Negative for weakness, numbness, tingling, loss of consciousness  Psych: Negative for anxiety, depression  Heme/Lymph: Negative for easy bruising, bleeding, lymphadenopathy          Past Medical History:   Diagnosis Date    CAD (coronary artery disease)     Cardiomyopathy [facility]     Dupuytren contracture     Dyslipidemia     Essential hypertension     ICD (implantable cardioverter-defibrillator) in place     Lumbosacral radiculopathy at S1     Osteoarthritis     Rheumatoid arthritis (HCC)     SNHL (sensorineural hearing loss)     Spinal stenosis     Type 2 diabetes mellitus ([facility] )        Past Surgical History:   Procedure Laterality Date    AORTIC VALVE REPLACEMENT  CHOLECYSTECTOMY         Social History     Socioeconomic History    Marital status: /Civil Union     Spouse name: Not on file    Number of children: Not on file    Years of education: Not on file    Highest education level: Not on file   Occupational History    Not on file   Social Needs    Financial resource strain: Not on file    Food insecurity:     Worry: Not on file     Inability: Not on file    Transportation needs:     Medical: Not on file     Non-medical: Not on file   Tobacco Use    Smoking status: Former Smoker     Last attempt to quit:      Years since quittin 2    Smokeless tobacco: Never Used   Substance and Sexual Activity    Alcohol use: No    Drug use: No    Sexual activity: Not on file   Lifestyle    Physical activity:     Days per week: Not on file     Minutes per session: Not on file    Stress: Not on file   Relationships    Social connections:     Talks on phone: Not on file     Gets together: Not on file     Attends [employer] service: Not on file     Active member of club or organization: Not on file     Attends meetings of clubs or organizations: Not on file     Relationship status: Not on file    Intimate partner violence:     Fear of current or ex partner: Not on file     Emotionally abused: Not on file     Physically abused: Not on file     Forced sexual activity: Not on file   Other Topics Concern    Not on file   Social History Narrative    Not on file       Family History   Problem Relation Age of Onset    Heart disease Brother     Cancer Brother     COPD Father     Diabetes Mother        No Known Allergies      Current Outpatient Medications:     albuterol (PROVENTIL HFA,VENTOLIN HFA) 90 mcg/act inhaler, Inhale 2 puffs, Disp: , Rfl:     amLODIPine (NORVASC) 10 mg tablet, Take 0 5 tablets (5 mg total) by mouth daily, Disp: 45 tablet, Rfl: 1    benazepril (LOTENSIN) 5 mg tablet, Take 5 mg by mouth, Disp: , Rfl:     calcium carbonate-vitamin D (OSCAL-D) 500 mg-200 units per tablet, Take 1 tablet by mouth 2 (two) times a day with meals, Disp: 60 tablet, Rfl: 11    ergocalciferol (VITAMIN D2) 50,000 units, Take 1 capsule (50,000 Units total) by mouth once a week, Disp: 4 capsule, Rfl: 0    fluticasone (FLONASE) 50 mcg/act nasal spray, 1 spray into each nostril daily, Disp: , Rfl:     gabapentin (NEURONTIN) 100 mg capsule, Take 1 capsule (100 mg total) by mouth 2 (two) times a day, Disp: 180 capsule, Rfl: 1    glucose blood (ONE TOUCH ULTRA TEST) test strip, 1 each by Other route daily Use as instructed, Disp: 100 each, Rfl: 3    glucose blood test strip, 1 each by Other route daily Use as instructed, Disp: 100 each, Rfl: 1    Lancet Devices (ONE TOUCH DELICA LANCING DEV) MISC, Check sugar daily and as needed  Dx: E11 9, Disp: , Rfl:     metoprolol succinate (TOPROL XL) 50 mg 24 hr tablet, Take 1 tablet (50 mg total) by mouth 2 (two) times a day, Disp: 180 tablet, Rfl: 1    ONETOUCH DELICA LANCETS 64R MISC, Check sugar daily and as needed  Dx: E11 9, Disp: , Rfl:     potassium chloride (K-DUR,KLOR-CON) 10 mEq tablet, Take 10 mEq by mouth, Disp: , Rfl:     rosuvastatin (CRESTOR) 10 MG tablet, 1 by mouth daily, Disp: , Rfl:     warfarin (COUMADIN) 2 mg tablet, 2 mg alternating with 3 mg daily or as directed, Disp: 90 tablet, Rfl: 1    warfarin (COUMADIN) 3 mg tablet, Take 1 tablet (3 mg total) by mouth daily OR AS DIRECTED, Disp: 90 tablet, Rfl: 3    warfarin (COUMADIN) 4 mg tablet, TAKE 1 TABLET BY MOUTH 5 DAYS A WEEK, Disp: , Rfl: 0    hydroxychloroquine (PLAQUENIL) 200 mg tablet, Take 1 tablet (200 mg total) by mouth 2 (two) times a day with meals for 30 days, Disp: 60 tablet, Rfl: 5      Objective:    Vitals:    03/12/19 1110   BP: 129/77   Pulse: 80   Weight: 75 3 kg (166 lb)   Height: 5' 10" (1 778 m)       Physical Exam  General: Well appearing, well nourished, in no distress  Oriented x 3, normal mood and affect    Ambulating without difficulty  Skin: Good turgor, no rash, unusual bruising or prominent lesions  Nails: Normal color, no deformities  HEENT:  Head: Normocephalic, atraumatic  Eyes: Conjunctiva clear, sclera non-icteric, EOM intact  Nose: No external lesions, mucosa non-inflamed  Mouth: Mucous membranes moist, no mucosal lesions  Neck: Supple, thyroid non-enlarged and non-tender  No lymphadenopathy  Extremities: No amputations or deformities, cyanosis, edema  Musculoskeletal:   Hands - there is significant improvement noted in soft tissue swelling at his bilateral PIPs  There is still synovitis noted at his PIP joints bilaterally, most prominently at his left hand 4th PIP  There is no tenderness to palpation of his DIPs, PIPs or MCPs  Ino Sneed does have osteoarthritic changes present at his DIPs bilaterally   Right 5th digit has a fixed boutonniere deformity   Slight ulnar deviation of his right hand   MCPs are unremarkable  Wrists - there is no tenderness present bilaterally and he has full range of motion, but there appears to be mild soft tissue swelling at his left wrist   Elbows - chronic synovial hypertrophy noted more significantly at his right elbow   He has slight limitation in full extension of right elbow   Elbows are nontender  Shoulders - nontender without any soft tissue swelling, but would limited active abduction till 90° bilaterally   Able to perform increased range of motion with passive motion  Knees - they are nontender without soft tissue swelling   Crepitus present bilaterally  Bony enlargement present bilaterally  Ankles and feet - nontender with negative MTP squeeze test bilaterally  Neurologic: Alert and oriented  No focal neurological deficits appreciated  Psychiatric: Normal mood and affect  NAVYA Lopez    Rheumatology

## 2019-03-13 ENCOUNTER — ANTICOAG VISIT (OUTPATIENT)
Dept: FAMILY MEDICINE CLINIC | Facility: CLINIC | Age: 84
End: 2019-03-13

## 2019-03-13 ENCOUNTER — APPOINTMENT (OUTPATIENT)
Dept: LAB | Facility: CLINIC | Age: 84
End: 2019-03-13
Payer: MEDICARE

## 2019-03-13 DIAGNOSIS — I48.0 PAROXYSMAL ATRIAL FIBRILLATION (HCC): ICD-10-CM

## 2019-03-13 LAB
INR PPP: 3.33 (ref 0.86–1.17)
PROTHROMBIN TIME: 33.8 SECONDS (ref 11.8–14.2)

## 2019-03-13 PROCEDURE — 36415 COLL VENOUS BLD VENIPUNCTURE: CPT

## 2019-03-13 PROCEDURE — 85610 PROTHROMBIN TIME: CPT

## 2019-03-18 PROBLEM — E11.42 DIABETIC POLYNEUROPATHY ASSOCIATED WITH TYPE 2 DIABETES MELLITUS (HCC): Status: RESOLVED | Noted: 2019-01-17 | Resolved: 2019-03-18

## 2019-03-19 ENCOUNTER — OFFICE VISIT (OUTPATIENT)
Dept: FAMILY MEDICINE CLINIC | Facility: CLINIC | Age: 84
End: 2019-03-19
Payer: MEDICARE

## 2019-03-19 VITALS
TEMPERATURE: 95.5 F | BODY MASS INDEX: 23.05 KG/M2 | WEIGHT: 161 LBS | RESPIRATION RATE: 20 BRPM | HEIGHT: 70 IN | HEART RATE: 82 BPM | SYSTOLIC BLOOD PRESSURE: 124 MMHG | DIASTOLIC BLOOD PRESSURE: 60 MMHG

## 2019-03-19 DIAGNOSIS — I42.9 CARDIOMYOPATHY, UNSPECIFIED TYPE (HCC): ICD-10-CM

## 2019-03-19 DIAGNOSIS — E78.5 DYSLIPIDEMIA, GOAL LDL BELOW 70: ICD-10-CM

## 2019-03-19 DIAGNOSIS — Z87.891 FORMER SMOKER: ICD-10-CM

## 2019-03-19 DIAGNOSIS — I10 ESSENTIAL HYPERTENSION: ICD-10-CM

## 2019-03-19 DIAGNOSIS — R79.89 ABNORMAL TSH: ICD-10-CM

## 2019-03-19 DIAGNOSIS — I44.2 CHB (COMPLETE HEART BLOCK) (HCC): ICD-10-CM

## 2019-03-19 DIAGNOSIS — E11.42 DIABETIC POLYNEUROPATHY ASSOCIATED WITH TYPE 2 DIABETES MELLITUS (HCC): Primary | ICD-10-CM

## 2019-03-19 DIAGNOSIS — R06.02 SHORTNESS OF BREATH: ICD-10-CM

## 2019-03-19 DIAGNOSIS — I48.0 PAROXYSMAL ATRIAL FIBRILLATION (HCC): ICD-10-CM

## 2019-03-19 PROCEDURE — 99214 OFFICE O/P EST MOD 30 MIN: CPT | Performed by: NURSE PRACTITIONER

## 2019-03-19 NOTE — PROGRESS NOTES
Assessment/Plan:    Labs as ordered  CXR for adventitious breath sounds, SOB, and chest discomfort  Consider CT given weight loss and smoking history of CXR unrevealing  To call office with appt for dental work for bridging therapy  RTO 3 months or sooner prn    Problem List Items Addressed This Visit        Endocrine    RESOLVED: Diabetic polyneuropathy associated with type 2 diabetes mellitus ([facility] ) - Primary       Cardiovascular and Mediastinum    Cardiomyopathy ([facility] [address] )    CHB (complete heart block) ([facility] )    Essential hypertension     Stable on current medications         Paroxysmal atrial fibrillation (HCC)     On Toprol and Coumadin  Due for cardiology f/u soon            Other    Abnormal TSH     Repeat labs ordered         Relevant Orders    TSH, 3rd generation    T4, free    Dyslipidemia, goal LDL below 70     Repeat labs ordered         Relevant Orders    Comprehensive metabolic panel    Lipid panel    Former smoker      Other Visit Diagnoses     Shortness of breath        Relevant Orders    XR chest pa & lateral          BMI Counseling: Body mass index is 23 1 kg/m²  Discussed the patient's BMI with him  The BMI is above average  BMI counseling and education was provided to the patient  Exercise recommendations include moderate aerobic physical activity for 150 minutes/week  Patient Instructions   Labs tomorrow  These will be fasting, so nothing to eat or drink for 12 hours prior to labs  Please have the chest Xray done tomorrow when you go for your labs  Please schedule a follow up visit with the cardiologist, Dr Stephanie Chandra  Let me know when you are scheduled for your dental work  We will need to have you start injections 1 week prior to dental work  Return in about 3 months (around 6/19/2019)  Subjective:      Patient ID: Qasim Dodd is a 80 y o  male  Chief Complaint   Patient presents with    Follow-up    Results    Blood Pressure Check       Here today for 3 month follow up  Has some discomfort in his lungs when he takes a deep breath in  Has some SOB, but denies wheezing or cough  Needs to schedule tooth extraction, will need bridging therapy for Coumadin  8 pound weight loss since January, states he doesn't eat as well as he used to  Appetite normal           The following portions of the patient's history were reviewed and updated as appropriate: allergies, current medications, past family history, past medical history, past social history, past surgical history and problem list     Review of Systems   Constitutional: Negative for chills, fatigue and fever  HENT: Negative for congestion, ear pain, postnasal drip, rhinorrhea, sinus pressure and sore throat  Respiratory: Positive for chest tightness and shortness of breath  Negative for cough and wheezing  Cardiovascular: Negative for chest pain  Gastrointestinal: Negative for abdominal pain, diarrhea, nausea and vomiting  Musculoskeletal: Negative for arthralgias  Skin: Negative for rash  Neurological: Negative for headaches           Current Outpatient Medications   Medication Sig Dispense Refill    albuterol (PROVENTIL HFA,VENTOLIN HFA) 90 mcg/act inhaler Inhale 2 puffs      amLODIPine (NORVASC) 10 mg tablet Take 0 5 tablets (5 mg total) by mouth daily 45 tablet 1    calcium carbonate-vitamin D (OSCAL-D) 500 mg-200 units per tablet Take 1 tablet by mouth 2 (two) times a day with meals 60 tablet 11    fluticasone (FLONASE) 50 mcg/act nasal spray 1 spray into each nostril daily      gabapentin (NEURONTIN) 100 mg capsule Take 1 capsule (100 mg total) by mouth 2 (two) times a day 180 capsule 1    glucose blood (ONE TOUCH ULTRA TEST) test strip 1 each by Other route daily Use as instructed 100 each 3    Lancet Devices (ONE TOUCH DELICA LANCING DEV) MISC Check sugar daily and as needed  Dx: E11 9      metoprolol succinate (TOPROL XL) 50 mg 24 hr tablet Take 1 tablet (50 mg total) by mouth 2 (two) times a day 180 tablet 1    ONETOUCH DELICA LANCETS 49P MISC Check sugar daily and as needed  Dx: E11 9      rosuvastatin (CRESTOR) 10 MG tablet 1 by mouth daily      warfarin (COUMADIN) 2 mg tablet 2 mg alternating with 3 mg daily or as directed 90 tablet 1    warfarin (COUMADIN) 3 mg tablet Take 1 tablet (3 mg total) by mouth daily OR AS DIRECTED 90 tablet 3    warfarin (COUMADIN) 4 mg tablet TAKE 1 TABLET BY MOUTH 5 DAYS A WEEK  0    benazepril (LOTENSIN) 5 mg tablet Take 5 mg by mouth      ergocalciferol (VITAMIN D2) 50,000 units Take 1 capsule (50,000 Units total) by mouth once a week (Patient not taking: Reported on 3/19/2019) 4 capsule 0    hydroxychloroquine (PLAQUENIL) 200 mg tablet Take 1 tablet (200 mg total) by mouth 2 (two) times a day with meals for 30 days 60 tablet 5    potassium chloride (K-DUR,KLOR-CON) 10 mEq tablet Take 10 mEq by mouth       No current facility-administered medications for this visit  Objective:    /60   Pulse 82   Temp (!) 95 5 °F (35 3 °C)   Resp 20   Ht 5' 10" (1 778 m)   Wt 73 kg (161 lb)   BMI 23 10 kg/m²        Physical Exam   Constitutional: He appears well-developed and well-nourished  HENT:   Head: Normocephalic and atraumatic  Right Ear: Tympanic membrane, external ear and ear canal normal    Left Ear: Tympanic membrane, external ear and ear canal normal    Nose: No mucosal edema or rhinorrhea  Mouth/Throat: Uvula is midline, oropharynx is clear and moist and mucous membranes are normal    Eyes: Conjunctivae are normal    Neck: Neck supple  No edema present  No thyromegaly present  Cardiovascular: Normal rate, regular rhythm, normal heart sounds and intact distal pulses  No murmur heard  Pulmonary/Chest: Effort normal  He has wheezes  He has rhonchi in the left upper field  Abdominal: Bowel sounds are normal  He exhibits no distension  There is no splenomegaly or hepatomegaly  There is no tenderness     Lymphadenopathy:        Right cervical: No superficial cervical adenopathy present  Left cervical: No superficial cervical adenopathy present  Skin: Skin is warm, dry and intact  No rash noted  Psychiatric: He has a normal mood and affect  Nursing note and vitals reviewed               Evlyn Estelita

## 2019-03-19 NOTE — PATIENT INSTRUCTIONS
Labs tomorrow  These will be fasting, so nothing to eat or drink for 12 hours prior to labs  Please have the chest Xray done tomorrow when you go for your labs  Please schedule a follow up visit with the cardiologist, Dr Alves Channel  Let me know when you are scheduled for your dental work  We will need to have you start injections 1 week prior to dental work

## 2019-03-20 ENCOUNTER — ANTICOAG VISIT (OUTPATIENT)
Dept: FAMILY MEDICINE CLINIC | Facility: CLINIC | Age: 84
End: 2019-03-20

## 2019-03-20 ENCOUNTER — APPOINTMENT (OUTPATIENT)
Dept: LAB | Facility: CLINIC | Age: 84
End: 2019-03-20
Payer: MEDICARE

## 2019-03-20 ENCOUNTER — APPOINTMENT (OUTPATIENT)
Dept: RADIOLOGY | Facility: CLINIC | Age: 84
End: 2019-03-20
Payer: MEDICARE

## 2019-03-20 DIAGNOSIS — R06.02 SHORTNESS OF BREATH: ICD-10-CM

## 2019-03-20 DIAGNOSIS — I48.0 PAROXYSMAL ATRIAL FIBRILLATION (HCC): ICD-10-CM

## 2019-03-20 LAB
INR PPP: 2.95 (ref 0.86–1.17)
PROTHROMBIN TIME: 30.8 SECONDS (ref 11.8–14.2)

## 2019-03-20 PROCEDURE — 36415 COLL VENOUS BLD VENIPUNCTURE: CPT

## 2019-03-20 PROCEDURE — 85610 PROTHROMBIN TIME: CPT

## 2019-03-20 PROCEDURE — 71046 X-RAY EXAM CHEST 2 VIEWS: CPT

## 2019-03-25 ENCOUNTER — TELEPHONE (OUTPATIENT)
Dept: FAMILY MEDICINE CLINIC | Facility: CLINIC | Age: 84
End: 2019-03-25

## 2019-03-25 DIAGNOSIS — J18.9 PNEUMONIA OF LEFT UPPER LOBE DUE TO INFECTIOUS ORGANISM: Primary | ICD-10-CM

## 2019-03-25 RX ORDER — DOXYCYCLINE HYCLATE 100 MG/1
100 CAPSULE ORAL EVERY 12 HOURS SCHEDULED
Qty: 20 CAPSULE | Refills: 0 | Status: SHIPPED | OUTPATIENT
Start: 2019-03-25 | End: 2019-04-04

## 2019-03-25 NOTE — TELEPHONE ENCOUNTER
Spoke w /pt  CXR shows infiltrate consistent with pneumonia  Will treat with Doxycycline  He will go for INR 3/27 as scheduled  Repeat CXR in 1 month   Rayray Miles

## 2019-03-27 ENCOUNTER — APPOINTMENT (OUTPATIENT)
Dept: LAB | Facility: CLINIC | Age: 84
End: 2019-03-27
Payer: MEDICARE

## 2019-03-27 ENCOUNTER — ANTICOAG VISIT (OUTPATIENT)
Dept: FAMILY MEDICINE CLINIC | Facility: CLINIC | Age: 84
End: 2019-03-27

## 2019-03-27 DIAGNOSIS — I48.0 PAROXYSMAL ATRIAL FIBRILLATION (HCC): ICD-10-CM

## 2019-03-27 LAB
INR PPP: 2.25 (ref 0.86–1.17)
PROTHROMBIN TIME: 24.9 SECONDS (ref 11.8–14.2)

## 2019-03-27 PROCEDURE — 85610 PROTHROMBIN TIME: CPT

## 2019-03-27 PROCEDURE — 36415 COLL VENOUS BLD VENIPUNCTURE: CPT

## 2019-03-27 NOTE — PROGRESS NOTES
Pt having dental extraction 4/3   Instructions are as follows: 3mg today, 2 mg on 3/28, 3 mg on 3/29 and 3/30  Luz Rhodes 3/31-4/3   Resume 4/4 with current schedule (2mg Thurs and Sun, 3 mg all other days)   No INR next week   Due for next INR Wed 4/10   GUSTAVO Arechiga, MA  Calender given to [facility]

## 2019-03-28 ENCOUNTER — OFFICE VISIT (OUTPATIENT)
Dept: PODIATRY | Facility: CLINIC | Age: 84
End: 2019-03-28
Payer: MEDICARE

## 2019-03-28 VITALS
SYSTOLIC BLOOD PRESSURE: 125 MMHG | RESPIRATION RATE: 17 BRPM | HEART RATE: 71 BPM | DIASTOLIC BLOOD PRESSURE: 73 MMHG | BODY MASS INDEX: 23.05 KG/M2 | WEIGHT: 161 LBS | HEIGHT: 70 IN

## 2019-03-28 DIAGNOSIS — B35.1 ONYCHOMYCOSIS: ICD-10-CM

## 2019-03-28 DIAGNOSIS — M79.672 PAIN IN BOTH FEET: ICD-10-CM

## 2019-03-28 DIAGNOSIS — M79.671 PAIN IN BOTH FEET: ICD-10-CM

## 2019-03-28 DIAGNOSIS — I70.209 PERIPHERAL ARTERIOSCLEROSIS (HCC): Primary | ICD-10-CM

## 2019-03-28 PROCEDURE — 11721 DEBRIDE NAIL 6 OR MORE: CPT | Performed by: PODIATRIST

## 2019-03-28 NOTE — PROGRESS NOTES
Procedures   Foot Exam       Assessment/Plan:  Pain upon ambulation  Peripheral artery disease  Diabetic neuropathy  Mycosis of nail      Plan  Diabetic foot exam performed  All mycotic nails debrided without pain or complication      No problem-specific Assessment & Plan notes found for this encounter          There are no diagnoses linked to this encounter        Subjective:  patient complains of pain in his toes and feet with ambulation    No history of trauma             Past Medical History:   Diagnosis Date    CAD (coronary artery disease)      [facility]      Dupuytren contracture      Dyslipidemia      Essential hypertension      ICD (implantable cardioverter-defibrillator) in place      Lumbosacral radiculopathy at S1      Osteoarthritis      Rheumatoid arthritis (HCC)      SNHL (sensorineural hearing loss)      Spinal stenosis      Type 2 diabetes mellitus ([facility] )                     Past Surgical History:   Procedure Laterality Date    AORTIC VALVE REPLACEMENT        CHOLECYSTECTOMY             No Known Allergies        Current Outpatient Prescriptions:     albuterol (PROVENTIL HFA,VENTOLIN HFA) 90 mcg/act inhaler, Inhale 2 puffs, Disp: , Rfl:     amLODIPine (NORVASC) 10 mg tablet, take 1/2  tablet daily, Disp: , Rfl:     benazepril (LOTENSIN) 5 mg tablet, Take 5 mg by mouth, Disp: , Rfl:     fluticasone (FLONASE) 50 mcg/act nasal spray, 1 spray into each nostril daily, Disp: , Rfl:     folic acid (FOLVITE) 1 mg tablet, Take 1 mg by mouth, Disp: , Rfl:     gabapentin (NEURONTIN) 100 mg capsule, Take 100 mg by mouth, Disp: , Rfl:     glucose blood (ONE TOUCH ULTRA TEST) test strip, Check sugar daily and as needed  Dx: E11 9, Disp: , Rfl:     glucose blood test strip, Check sugar daily and as needed  Dx: E11 9, Disp: , Rfl:     hydroxychloroquine (PLAQUENIL) 200 mg tablet, Take 1 tablet (200 mg total) by mouth 2 (two) times a day with meals for 30 days, Disp: 60 tablet, Rfl: 5    Lancet Devices (ONE TOUCH DELICA LANCING DEV) MISC, Check sugar daily and as needed  Dx: E11 9, Disp: , Rfl:     methotrexate (RHEUMATREX) 2 5 MG tablet, Take 4 tablets (10 mg total) by mouth once a week, Disp: 16 tablet, Rfl: 2    metoprolol succinate (TOPROL XL) 50 mg 24 hr tablet, Take 50 mg by mouth, Disp: , Rfl:     ONETOUCH DELICA LANCETS 11Y MISC, Check sugar daily and as needed  Dx: E11 9, Disp: , Rfl:     potassium chloride (K-DUR,KLOR-CON) 10 mEq tablet, Take 10 mEq by mouth, Disp: , Rfl:     PredniSONE 5 MG TBEC, Take 1 tablet (5 mg total) by mouth daily, Disp: 30 tablet, Rfl: 5    rosuvastatin (CRESTOR) 10 MG tablet, 1 by mouth daily, Disp: , Rfl:     warfarin (COUMADIN) 2 mg tablet, Take 2 mg on Mondays and Fridays and 4 mg all other days of the week or as directed, Disp: , Rfl:            Patient Active Problem List   Diagnosis    Patient refuses to disclose advance directive information    Bilateral sensorineural hearing loss    Biventricular ICD (implantable cardioverter-defibrillator) in place    [facility]    CHB (complete heart block) [facility]    Coronary artery disease involving native coronary artery of native heart without angina pectoris    Dupuytren contracture    Dyslipidemia, goal LDL below 70    Hyperplasia of prostate with lower urinary tract symptoms (LUTS)    Former smoker    Essential hypertension    Lymphedema of left leg    Osteoarthritis of multiple joints    Pacemaker    Paroxysmal atrial fibrillation (HCC)    RA (rheumatoid arthritis) ([facility]    Restrictive lung disease    RBBB    S/P AVR (aortic valve replacement)    S/P primary angioplasty with coronary stent    Spinal stenosis of lumbar region without neurogenic claudication    Type 2 diabetes mellitus with hemoglobin A1c goal of less than 7 0% ([facility])    Abnormal TSH             Patient ID: Nitin Burgos is a 80 y  o  male      HPI     The following portions of the patient's history were reviewed and updated as appropriate: allergies, current medications, past family history, past medical history, past social history, past surgical history and problem list      Review of Systems       Objective:  Patient's shoes and socks removed    Foot Exam     Right Foot/Ankle      Inspection and Palpation  Skin Exam: dry skin;      Neurovascular  Dorsalis pedis: 1+  Posterior tibial: 1+        Left Foot/Ankle       Inspection and Palpation  Skin Exam: dry skin;      Neurovascular  Dorsalis pedis: 1+  Posterior tibial: 1+        Physical Exam   Cardiovascular: Pulses are weak pulses  Pulses:       Dorsalis pedis pulses are 1+ on the right side, and 1+ on the left side         Posterior tibial pulses are 1+ on the right side, and 1+ on the left side  Feet:   Right Foot:   Skin Integrity: Positive for warmth and dry skin  Left Foot:   Skin Integrity: Positive for warmth and dry skin  Patient's shoes and socks removed  Right Foot/Ankle   Right Foot Inspection  Skin Exam: dry skin and warmth               Toe Exam: swelling and erythema  Sensory   Vibration: diminished  Proprioception: diminished   Monofilament testing: intact  Vascular  Capillary refills: elevated  The right DP pulse is 1+  The right PT pulse is 1+       Left Foot/Ankle  Left Foot Inspection  Skin Exam: dry skin and warmth                 Sensory   Vibration: diminished  Proprioception: diminished  Monofilament: intact  Vascular  Capillary refills: elevated  The left DP pulse is 1+  The left PT pulse is 1+       Patient's shoes and socks removed  Right Foot/Ankle   Right Foot Inspection  Skin Exam: dry skin                                       Left Foot/Ankle  Left Foot Inspection  Skin Exam: dry skin    Patient's shoes and socks removed  Assign Risk Category:  Deformity present;  Loss of protective sensation; Weak pulses       Risk: 2

## 2019-04-10 ENCOUNTER — APPOINTMENT (OUTPATIENT)
Dept: LAB | Facility: CLINIC | Age: 84
End: 2019-04-10
Payer: MEDICARE

## 2019-04-10 ENCOUNTER — TRANSCRIBE ORDERS (OUTPATIENT)
Dept: ADMINISTRATIVE | Facility: HOSPITAL | Age: 84
End: 2019-04-10

## 2019-04-10 ENCOUNTER — ANTICOAG VISIT (OUTPATIENT)
Dept: FAMILY MEDICINE CLINIC | Facility: CLINIC | Age: 84
End: 2019-04-10

## 2019-04-10 DIAGNOSIS — I48.0 PAROXYSMAL ATRIAL FIBRILLATION (HCC): Primary | ICD-10-CM

## 2019-04-10 DIAGNOSIS — I48.0 PAROXYSMAL ATRIAL FIBRILLATION (HCC): ICD-10-CM

## 2019-04-10 LAB
INR PPP: 1.66 (ref 0.86–1.17)
PROTHROMBIN TIME: 19.7 SECONDS (ref 11.8–14.2)

## 2019-04-10 PROCEDURE — 85610 PROTHROMBIN TIME: CPT

## 2019-04-10 PROCEDURE — 36415 COLL VENOUS BLD VENIPUNCTURE: CPT

## 2019-04-17 ENCOUNTER — ANTICOAG VISIT (OUTPATIENT)
Dept: FAMILY MEDICINE CLINIC | Facility: CLINIC | Age: 84
End: 2019-04-17

## 2019-04-17 ENCOUNTER — TELEPHONE (OUTPATIENT)
Dept: FAMILY MEDICINE CLINIC | Facility: CLINIC | Age: 84
End: 2019-04-17

## 2019-04-17 ENCOUNTER — APPOINTMENT (OUTPATIENT)
Dept: LAB | Facility: CLINIC | Age: 84
End: 2019-04-17
Payer: MEDICARE

## 2019-04-17 DIAGNOSIS — I48.0 PAROXYSMAL ATRIAL FIBRILLATION (HCC): ICD-10-CM

## 2019-04-17 DIAGNOSIS — J18.9 PNEUMONIA OF LEFT UPPER LOBE DUE TO INFECTIOUS ORGANISM: Primary | ICD-10-CM

## 2019-04-17 LAB
INR PPP: 1.95 (ref 0.86–1.17)
PROTHROMBIN TIME: 22.3 SECONDS (ref 11.8–14.2)

## 2019-04-17 PROCEDURE — 85610 PROTHROMBIN TIME: CPT

## 2019-04-17 PROCEDURE — 36415 COLL VENOUS BLD VENIPUNCTURE: CPT

## 2019-04-18 ENCOUNTER — TELEPHONE (OUTPATIENT)
Dept: FAMILY MEDICINE CLINIC | Facility: CLINIC | Age: 84
End: 2019-04-18

## 2019-04-18 ENCOUNTER — APPOINTMENT (OUTPATIENT)
Dept: LAB | Facility: CLINIC | Age: 84
End: 2019-04-18
Payer: MEDICARE

## 2019-04-18 ENCOUNTER — APPOINTMENT (OUTPATIENT)
Dept: RADIOLOGY | Facility: CLINIC | Age: 84
End: 2019-04-18
Payer: MEDICARE

## 2019-04-18 DIAGNOSIS — I48.0 PAROXYSMAL ATRIAL FIBRILLATION (HCC): ICD-10-CM

## 2019-04-18 DIAGNOSIS — J18.9 PNEUMONIA OF LEFT UPPER LOBE DUE TO INFECTIOUS ORGANISM: ICD-10-CM

## 2019-04-18 PROCEDURE — 71046 X-RAY EXAM CHEST 2 VIEWS: CPT

## 2019-04-24 ENCOUNTER — APPOINTMENT (OUTPATIENT)
Dept: LAB | Facility: CLINIC | Age: 84
End: 2019-04-24
Payer: MEDICARE

## 2019-04-24 ENCOUNTER — ANTICOAG VISIT (OUTPATIENT)
Dept: FAMILY MEDICINE CLINIC | Facility: CLINIC | Age: 84
End: 2019-04-24

## 2019-04-24 DIAGNOSIS — I48.0 PAROXYSMAL ATRIAL FIBRILLATION (HCC): ICD-10-CM

## 2019-04-24 LAB
INR PPP: 1.89 (ref 0.86–1.17)
PROTHROMBIN TIME: 21.8 SECONDS (ref 11.8–14.2)

## 2019-04-24 PROCEDURE — 36415 COLL VENOUS BLD VENIPUNCTURE: CPT

## 2019-04-24 PROCEDURE — 85610 PROTHROMBIN TIME: CPT

## 2019-04-29 ENCOUNTER — OFFICE VISIT (OUTPATIENT)
Dept: FAMILY MEDICINE CLINIC | Facility: CLINIC | Age: 84
End: 2019-04-29
Payer: MEDICARE

## 2019-04-29 VITALS
SYSTOLIC BLOOD PRESSURE: 160 MMHG | HEIGHT: 70 IN | BODY MASS INDEX: 22.56 KG/M2 | DIASTOLIC BLOOD PRESSURE: 76 MMHG | TEMPERATURE: 96.6 F | WEIGHT: 157.6 LBS | HEART RATE: 68 BPM | RESPIRATION RATE: 18 BRPM

## 2019-04-29 DIAGNOSIS — E78.5 DYSLIPIDEMIA, GOAL LDL BELOW 70: ICD-10-CM

## 2019-04-29 DIAGNOSIS — I48.0 PAROXYSMAL ATRIAL FIBRILLATION (HCC): ICD-10-CM

## 2019-04-29 DIAGNOSIS — I44.2 CHB (COMPLETE HEART BLOCK) (HCC): ICD-10-CM

## 2019-04-29 DIAGNOSIS — I42.9 CARDIOMYOPATHY, UNSPECIFIED TYPE (HCC): ICD-10-CM

## 2019-04-29 DIAGNOSIS — Z95.810 BIVENTRICULAR ICD (IMPLANTABLE CARDIOVERTER-DEFIBRILLATOR) IN PLACE: ICD-10-CM

## 2019-04-29 DIAGNOSIS — M06.9 RHEUMATOID ARTHRITIS, INVOLVING UNSPECIFIED SITE, UNSPECIFIED RHEUMATOID FACTOR PRESENCE: ICD-10-CM

## 2019-04-29 DIAGNOSIS — I10 ESSENTIAL HYPERTENSION: Primary | ICD-10-CM

## 2019-04-29 PROCEDURE — 99214 OFFICE O/P EST MOD 30 MIN: CPT | Performed by: NURSE PRACTITIONER

## 2019-05-01 ENCOUNTER — APPOINTMENT (OUTPATIENT)
Dept: LAB | Facility: CLINIC | Age: 84
End: 2019-05-01
Payer: MEDICARE

## 2019-05-01 DIAGNOSIS — I48.0 PAROXYSMAL ATRIAL FIBRILLATION (HCC): ICD-10-CM

## 2019-05-01 LAB
INR PPP: 3.41 (ref 0.86–1.17)
PROTHROMBIN TIME: 34.4 SECONDS (ref 11.8–14.2)

## 2019-05-01 PROCEDURE — 36415 COLL VENOUS BLD VENIPUNCTURE: CPT

## 2019-05-01 PROCEDURE — 85610 PROTHROMBIN TIME: CPT

## 2019-05-02 ENCOUNTER — ANTICOAG VISIT (OUTPATIENT)
Dept: FAMILY MEDICINE CLINIC | Facility: CLINIC | Age: 84
End: 2019-05-02

## 2019-05-02 DIAGNOSIS — I48.0 PAROXYSMAL ATRIAL FIBRILLATION (HCC): ICD-10-CM

## 2019-05-07 ENCOUNTER — OFFICE VISIT (OUTPATIENT)
Dept: RHEUMATOLOGY | Facility: CLINIC | Age: 84
End: 2019-05-07
Payer: MEDICARE

## 2019-05-07 VITALS
HEIGHT: 70 IN | BODY MASS INDEX: 22.05 KG/M2 | DIASTOLIC BLOOD PRESSURE: 72 MMHG | SYSTOLIC BLOOD PRESSURE: 152 MMHG | WEIGHT: 154 LBS | HEART RATE: 84 BPM

## 2019-05-07 DIAGNOSIS — M81.0 AGE-RELATED OSTEOPOROSIS WITHOUT CURRENT PATHOLOGICAL FRACTURE: ICD-10-CM

## 2019-05-07 DIAGNOSIS — M15.0 PRIMARY GENERALIZED (OSTEO)ARTHRITIS: ICD-10-CM

## 2019-05-07 DIAGNOSIS — M05.9 SEROPOSITIVE RHEUMATOID ARTHRITIS (HCC): Primary | ICD-10-CM

## 2019-05-07 PROCEDURE — 99213 OFFICE O/P EST LOW 20 MIN: CPT | Performed by: INTERNAL MEDICINE

## 2019-05-07 RX ORDER — HYDROXYCHLOROQUINE SULFATE 200 MG/1
200 TABLET, FILM COATED ORAL 2 TIMES DAILY WITH MEALS
Qty: 60 TABLET | Refills: 5 | Status: SHIPPED | OUTPATIENT
Start: 2019-05-07 | End: 2019-11-08 | Stop reason: SDUPTHER

## 2019-05-08 ENCOUNTER — ANTICOAG VISIT (OUTPATIENT)
Dept: FAMILY MEDICINE CLINIC | Facility: CLINIC | Age: 84
End: 2019-05-08

## 2019-05-08 ENCOUNTER — APPOINTMENT (OUTPATIENT)
Dept: LAB | Facility: CLINIC | Age: 84
End: 2019-05-08
Payer: MEDICARE

## 2019-05-08 DIAGNOSIS — I48.0 PAROXYSMAL ATRIAL FIBRILLATION (HCC): ICD-10-CM

## 2019-05-08 DIAGNOSIS — I48.0 PAROXYSMAL ATRIAL FIBRILLATION (HCC): Primary | ICD-10-CM

## 2019-05-08 LAB
INR PPP: 2.59 (ref 0.86–1.17)
PROTHROMBIN TIME: 27.8 SECONDS (ref 11.8–14.2)

## 2019-05-08 PROCEDURE — 36415 COLL VENOUS BLD VENIPUNCTURE: CPT

## 2019-05-08 PROCEDURE — 85610 PROTHROMBIN TIME: CPT

## 2019-05-15 ENCOUNTER — ANTICOAG VISIT (OUTPATIENT)
Dept: FAMILY MEDICINE CLINIC | Facility: CLINIC | Age: 84
End: 2019-05-15

## 2019-05-15 ENCOUNTER — APPOINTMENT (OUTPATIENT)
Dept: LAB | Facility: CLINIC | Age: 84
End: 2019-05-15
Payer: MEDICARE

## 2019-05-15 DIAGNOSIS — I48.0 PAROXYSMAL ATRIAL FIBRILLATION (HCC): ICD-10-CM

## 2019-05-15 LAB
INR PPP: 2.49 (ref 0.86–1.17)
PROTHROMBIN TIME: 27 SECONDS (ref 11.8–14.2)

## 2019-05-15 PROCEDURE — 36415 COLL VENOUS BLD VENIPUNCTURE: CPT

## 2019-05-15 PROCEDURE — 85610 PROTHROMBIN TIME: CPT

## 2019-05-16 ENCOUNTER — OFFICE VISIT (OUTPATIENT)
Dept: CARDIOLOGY CLINIC | Facility: CLINIC | Age: 84
End: 2019-05-16
Payer: MEDICARE

## 2019-05-16 VITALS
OXYGEN SATURATION: 98 % | DIASTOLIC BLOOD PRESSURE: 60 MMHG | WEIGHT: 156 LBS | HEART RATE: 67 BPM | SYSTOLIC BLOOD PRESSURE: 128 MMHG | HEIGHT: 70 IN | BODY MASS INDEX: 22.33 KG/M2

## 2019-05-16 DIAGNOSIS — I45.10 RBBB: ICD-10-CM

## 2019-05-16 DIAGNOSIS — I44.2 CHB (COMPLETE HEART BLOCK) (HCC): ICD-10-CM

## 2019-05-16 DIAGNOSIS — Z95.0 PRESENCE OF PERMANENT CARDIAC PACEMAKER: ICD-10-CM

## 2019-05-16 DIAGNOSIS — Z95.0 PACEMAKER: ICD-10-CM

## 2019-05-16 DIAGNOSIS — I10 ESSENTIAL HYPERTENSION: ICD-10-CM

## 2019-05-16 DIAGNOSIS — E78.5 DYSLIPIDEMIA, GOAL LDL BELOW 70: ICD-10-CM

## 2019-05-16 DIAGNOSIS — I48.0 PAROXYSMAL ATRIAL FIBRILLATION (HCC): Primary | ICD-10-CM

## 2019-05-16 DIAGNOSIS — I25.10 CORONARY ARTERY DISEASE INVOLVING NATIVE CORONARY ARTERY OF NATIVE HEART WITHOUT ANGINA PECTORIS: ICD-10-CM

## 2019-05-16 DIAGNOSIS — Z95.810 BIVENTRICULAR ICD (IMPLANTABLE CARDIOVERTER-DEFIBRILLATOR) IN PLACE: ICD-10-CM

## 2019-05-16 DIAGNOSIS — J98.4 RESTRICTIVE LUNG DISEASE: ICD-10-CM

## 2019-05-16 PROCEDURE — 99214 OFFICE O/P EST MOD 30 MIN: CPT | Performed by: INTERNAL MEDICINE

## 2019-05-16 PROCEDURE — 93000 ELECTROCARDIOGRAM COMPLETE: CPT | Performed by: INTERNAL MEDICINE

## 2019-05-22 ENCOUNTER — ANTICOAG VISIT (OUTPATIENT)
Dept: FAMILY MEDICINE CLINIC | Facility: CLINIC | Age: 84
End: 2019-05-22

## 2019-05-22 ENCOUNTER — APPOINTMENT (OUTPATIENT)
Dept: LAB | Facility: CLINIC | Age: 84
End: 2019-05-22
Payer: MEDICARE

## 2019-05-22 DIAGNOSIS — I48.0 PAROXYSMAL ATRIAL FIBRILLATION (HCC): ICD-10-CM

## 2019-05-22 LAB
INR PPP: 2.07 (ref 0.86–1.17)
PROTHROMBIN TIME: 23.4 SECONDS (ref 11.8–14.2)

## 2019-05-22 PROCEDURE — 85610 PROTHROMBIN TIME: CPT

## 2019-05-22 PROCEDURE — 36415 COLL VENOUS BLD VENIPUNCTURE: CPT

## 2019-05-29 ENCOUNTER — APPOINTMENT (OUTPATIENT)
Dept: LAB | Facility: CLINIC | Age: 84
End: 2019-05-29
Payer: MEDICARE

## 2019-05-29 ENCOUNTER — ANTICOAG VISIT (OUTPATIENT)
Dept: FAMILY MEDICINE CLINIC | Facility: CLINIC | Age: 84
End: 2019-05-29

## 2019-05-29 DIAGNOSIS — I48.0 PAROXYSMAL ATRIAL FIBRILLATION (HCC): ICD-10-CM

## 2019-05-29 LAB
INR PPP: 1.82 (ref 0.86–1.17)
PROTHROMBIN TIME: 21.2 SECONDS (ref 11.8–14.2)

## 2019-05-29 PROCEDURE — 36415 COLL VENOUS BLD VENIPUNCTURE: CPT

## 2019-05-29 PROCEDURE — 85610 PROTHROMBIN TIME: CPT

## 2019-06-05 ENCOUNTER — APPOINTMENT (OUTPATIENT)
Dept: LAB | Facility: CLINIC | Age: 84
End: 2019-06-05
Payer: MEDICARE

## 2019-06-05 ENCOUNTER — ANTICOAG VISIT (OUTPATIENT)
Dept: FAMILY MEDICINE CLINIC | Facility: CLINIC | Age: 84
End: 2019-06-05

## 2019-06-05 DIAGNOSIS — I48.0 PAROXYSMAL ATRIAL FIBRILLATION (HCC): ICD-10-CM

## 2019-06-05 LAB
INR PPP: 2.02 (ref 0.86–1.17)
PROTHROMBIN TIME: 22.9 SECONDS (ref 11.8–14.2)

## 2019-06-05 PROCEDURE — 85610 PROTHROMBIN TIME: CPT

## 2019-06-05 PROCEDURE — 36415 COLL VENOUS BLD VENIPUNCTURE: CPT

## 2019-06-06 ENCOUNTER — OFFICE VISIT (OUTPATIENT)
Dept: PODIATRY | Facility: CLINIC | Age: 84
End: 2019-06-06
Payer: MEDICARE

## 2019-06-06 VITALS
WEIGHT: 156 LBS | SYSTOLIC BLOOD PRESSURE: 129 MMHG | HEART RATE: 68 BPM | RESPIRATION RATE: 17 BRPM | BODY MASS INDEX: 22.33 KG/M2 | DIASTOLIC BLOOD PRESSURE: 70 MMHG | HEIGHT: 70 IN

## 2019-06-06 DIAGNOSIS — I70.209 PERIPHERAL ARTERIOSCLEROSIS (HCC): Primary | ICD-10-CM

## 2019-06-06 DIAGNOSIS — M79.671 PAIN IN BOTH FEET: ICD-10-CM

## 2019-06-06 DIAGNOSIS — B35.1 ONYCHOMYCOSIS: ICD-10-CM

## 2019-06-06 DIAGNOSIS — M79.672 PAIN IN BOTH FEET: ICD-10-CM

## 2019-06-06 PROCEDURE — 11721 DEBRIDE NAIL 6 OR MORE: CPT | Performed by: PODIATRIST

## 2019-06-12 ENCOUNTER — APPOINTMENT (OUTPATIENT)
Dept: LAB | Facility: CLINIC | Age: 84
End: 2019-06-12
Payer: MEDICARE

## 2019-06-12 ENCOUNTER — ANTICOAG VISIT (OUTPATIENT)
Dept: FAMILY MEDICINE CLINIC | Facility: CLINIC | Age: 84
End: 2019-06-12

## 2019-06-12 DIAGNOSIS — I48.0 PAROXYSMAL ATRIAL FIBRILLATION (HCC): ICD-10-CM

## 2019-06-12 LAB
INR PPP: 2.25 (ref 0.86–1.17)
PROTHROMBIN TIME: 24.9 SECONDS (ref 11.8–14.2)

## 2019-06-12 PROCEDURE — 85610 PROTHROMBIN TIME: CPT

## 2019-06-12 PROCEDURE — 36415 COLL VENOUS BLD VENIPUNCTURE: CPT

## 2019-06-14 DIAGNOSIS — I48.0 PAROXYSMAL ATRIAL FIBRILLATION (HCC): ICD-10-CM

## 2019-06-14 RX ORDER — WARFARIN SODIUM 3 MG/1
3 TABLET ORAL
Qty: 90 TABLET | Refills: 3 | Status: SHIPPED | OUTPATIENT
Start: 2019-06-14

## 2019-06-17 ENCOUNTER — IN-CLINIC DEVICE VISIT (OUTPATIENT)
Dept: CARDIOLOGY CLINIC | Facility: CLINIC | Age: 84
End: 2019-06-17
Payer: MEDICARE

## 2019-06-17 DIAGNOSIS — I42.9 CARDIOMYOPATHY, UNSPECIFIED TYPE (HCC): ICD-10-CM

## 2019-06-17 DIAGNOSIS — Z95.810 PRESENCE OF IMPLANTABLE CARDIOVERTER-DEFIBRILLATOR (ICD): ICD-10-CM

## 2019-06-17 DIAGNOSIS — I48.0 PAROXYSMAL ATRIAL FIBRILLATION (HCC): Primary | ICD-10-CM

## 2019-06-17 DIAGNOSIS — I44.2 CHB (COMPLETE HEART BLOCK) (HCC): ICD-10-CM

## 2019-06-17 PROCEDURE — 93284 PRGRMG EVAL IMPLANTABLE DFB: CPT | Performed by: INTERNAL MEDICINE

## 2019-06-19 ENCOUNTER — ANTICOAG VISIT (OUTPATIENT)
Dept: FAMILY MEDICINE CLINIC | Facility: CLINIC | Age: 84
End: 2019-06-19

## 2019-06-19 ENCOUNTER — APPOINTMENT (OUTPATIENT)
Dept: LAB | Facility: CLINIC | Age: 84
End: 2019-06-19
Payer: MEDICARE

## 2019-06-19 DIAGNOSIS — I48.0 PAROXYSMAL ATRIAL FIBRILLATION (HCC): ICD-10-CM

## 2019-06-19 DIAGNOSIS — I48.0 PAROXYSMAL ATRIAL FIBRILLATION (HCC): Primary | ICD-10-CM

## 2019-06-19 LAB
INR PPP: 2.11 (ref 0.86–1.17)
PROTHROMBIN TIME: 23.7 SECONDS (ref 11.8–14.2)

## 2019-06-19 PROCEDURE — 85610 PROTHROMBIN TIME: CPT

## 2019-06-19 PROCEDURE — 36415 COLL VENOUS BLD VENIPUNCTURE: CPT

## 2019-06-19 RX ORDER — WARFARIN SODIUM 4 MG/1
4 TABLET ORAL
Qty: 90 TABLET | Refills: 3 | Status: SHIPPED | OUTPATIENT
Start: 2019-06-19 | End: 2020-02-24 | Stop reason: SDUPTHER

## 2019-06-20 ENCOUNTER — OFFICE VISIT (OUTPATIENT)
Dept: FAMILY MEDICINE CLINIC | Facility: CLINIC | Age: 84
End: 2019-06-20
Payer: MEDICARE

## 2019-06-20 VITALS
SYSTOLIC BLOOD PRESSURE: 110 MMHG | RESPIRATION RATE: 16 BRPM | WEIGHT: 156.4 LBS | DIASTOLIC BLOOD PRESSURE: 58 MMHG | HEART RATE: 72 BPM | TEMPERATURE: 97.8 F | HEIGHT: 70 IN | BODY MASS INDEX: 22.39 KG/M2

## 2019-06-20 DIAGNOSIS — J30.1 SEASONAL ALLERGIC RHINITIS DUE TO POLLEN: ICD-10-CM

## 2019-06-20 DIAGNOSIS — M06.9 RHEUMATOID ARTHRITIS, INVOLVING UNSPECIFIED SITE, UNSPECIFIED RHEUMATOID FACTOR PRESENCE: ICD-10-CM

## 2019-06-20 DIAGNOSIS — H90.3 BILATERAL SENSORINEURAL HEARING LOSS: ICD-10-CM

## 2019-06-20 DIAGNOSIS — I44.2 CHB (COMPLETE HEART BLOCK) (HCC): ICD-10-CM

## 2019-06-20 DIAGNOSIS — I48.0 PAROXYSMAL ATRIAL FIBRILLATION (HCC): ICD-10-CM

## 2019-06-20 DIAGNOSIS — I10 ESSENTIAL HYPERTENSION: Primary | ICD-10-CM

## 2019-06-20 PROBLEM — M21.969 ACQUIRED DEFORMITY OF FOOT: Status: RESOLVED | Noted: 2018-11-15 | Resolved: 2019-06-20

## 2019-06-20 PROBLEM — M79.672 PAIN IN BOTH FEET: Status: RESOLVED | Noted: 2018-11-15 | Resolved: 2019-06-20

## 2019-06-20 PROBLEM — R06.00 DYSPNEA ON EXERTION: Status: RESOLVED | Noted: 2018-12-13 | Resolved: 2019-06-20

## 2019-06-20 PROBLEM — M79.671 PAIN IN BOTH FEET: Status: RESOLVED | Noted: 2018-11-15 | Resolved: 2019-06-20

## 2019-06-20 PROCEDURE — 99214 OFFICE O/P EST MOD 30 MIN: CPT | Performed by: NURSE PRACTITIONER

## 2019-06-20 RX ORDER — CALCIUM CARBONATE/VITAMIN D3 500-3.125
TABLET ORAL
Refills: 1 | COMMUNITY
Start: 2019-06-09

## 2019-06-20 RX ORDER — FLUTICASONE PROPIONATE 50 MCG
2 SPRAY, SUSPENSION (ML) NASAL DAILY
Qty: 16 G | Refills: 1 | Status: SHIPPED | OUTPATIENT
Start: 2019-06-20 | End: 2020-02-26 | Stop reason: SDUPTHER

## 2019-06-26 ENCOUNTER — ANTICOAG VISIT (OUTPATIENT)
Dept: FAMILY MEDICINE CLINIC | Facility: CLINIC | Age: 84
End: 2019-06-26

## 2019-06-26 ENCOUNTER — APPOINTMENT (OUTPATIENT)
Dept: LAB | Facility: CLINIC | Age: 84
End: 2019-06-26
Payer: MEDICARE

## 2019-06-26 DIAGNOSIS — I10 ESSENTIAL HYPERTENSION: ICD-10-CM

## 2019-06-26 DIAGNOSIS — I48.0 PAROXYSMAL ATRIAL FIBRILLATION (HCC): ICD-10-CM

## 2019-06-26 LAB
ALBUMIN SERPL BCP-MCNC: 2.9 G/DL (ref 3.5–5)
ALP SERPL-CCNC: 131 U/L (ref 46–116)
ALT SERPL W P-5'-P-CCNC: 15 U/L (ref 12–78)
ANION GAP SERPL CALCULATED.3IONS-SCNC: 4 MMOL/L (ref 4–13)
AST SERPL W P-5'-P-CCNC: 21 U/L (ref 5–45)
BILIRUB SERPL-MCNC: 0.71 MG/DL (ref 0.2–1)
BUN SERPL-MCNC: 10 MG/DL (ref 5–25)
CALCIUM SERPL-MCNC: 8.7 MG/DL (ref 8.3–10.1)
CHLORIDE SERPL-SCNC: 107 MMOL/L (ref 100–108)
CHOLEST SERPL-MCNC: 151 MG/DL (ref 50–200)
CO2 SERPL-SCNC: 28 MMOL/L (ref 21–32)
CREAT SERPL-MCNC: 0.57 MG/DL (ref 0.6–1.3)
GFR SERPL CREATININE-BSD FRML MDRD: 94 ML/MIN/1.73SQ M
GLUCOSE P FAST SERPL-MCNC: 90 MG/DL (ref 65–99)
HDLC SERPL-MCNC: 40 MG/DL (ref 40–60)
INR PPP: 2.24 (ref 0.84–1.19)
LDLC SERPL CALC-MCNC: 91 MG/DL (ref 0–100)
POTASSIUM SERPL-SCNC: 3.7 MMOL/L (ref 3.5–5.3)
PROT SERPL-MCNC: 7.4 G/DL (ref 6.4–8.2)
PROTHROMBIN TIME: 24.3 SECONDS (ref 11.6–14.5)
SODIUM SERPL-SCNC: 139 MMOL/L (ref 136–145)
TRIGL SERPL-MCNC: 102 MG/DL

## 2019-06-26 PROCEDURE — 80053 COMPREHEN METABOLIC PANEL: CPT

## 2019-06-26 PROCEDURE — 80061 LIPID PANEL: CPT

## 2019-06-26 PROCEDURE — 36415 COLL VENOUS BLD VENIPUNCTURE: CPT

## 2019-06-26 PROCEDURE — 85610 PROTHROMBIN TIME: CPT

## 2019-07-03 ENCOUNTER — APPOINTMENT (OUTPATIENT)
Dept: LAB | Facility: CLINIC | Age: 84
End: 2019-07-03
Payer: MEDICARE

## 2019-07-03 ENCOUNTER — TELEPHONE (OUTPATIENT)
Dept: FAMILY MEDICINE CLINIC | Facility: CLINIC | Age: 84
End: 2019-07-03

## 2019-07-03 ENCOUNTER — ANTICOAG VISIT (OUTPATIENT)
Dept: FAMILY MEDICINE CLINIC | Facility: CLINIC | Age: 84
End: 2019-07-03

## 2019-07-03 DIAGNOSIS — I48.0 PAROXYSMAL ATRIAL FIBRILLATION (HCC): ICD-10-CM

## 2019-07-03 LAB
INR PPP: 2.36 (ref 0.84–1.19)
PROTHROMBIN TIME: 25.3 SECONDS (ref 11.6–14.5)

## 2019-07-03 PROCEDURE — 85610 PROTHROMBIN TIME: CPT

## 2019-07-03 PROCEDURE — 36415 COLL VENOUS BLD VENIPUNCTURE: CPT

## 2019-07-03 NOTE — TELEPHONE ENCOUNTER
----- Message from Roni Jenkins LPN sent at 3/4/4412  4:53 PM EDT -----      ----- Message -----  From: Lab, Background User  Sent: 7/3/2019   4:39 PM EDT  To: [id number]

## 2019-07-10 ENCOUNTER — APPOINTMENT (OUTPATIENT)
Dept: LAB | Facility: CLINIC | Age: 84
End: 2019-07-10
Payer: MEDICARE

## 2019-07-10 ENCOUNTER — TELEPHONE (OUTPATIENT)
Dept: FAMILY MEDICINE CLINIC | Facility: CLINIC | Age: 84
End: 2019-07-10

## 2019-07-10 DIAGNOSIS — I48.0 PAROXYSMAL ATRIAL FIBRILLATION (HCC): ICD-10-CM

## 2019-07-10 LAB
INR PPP: 2.45 (ref 0.84–1.19)
PROTHROMBIN TIME: 26.1 SECONDS (ref 11.6–14.5)

## 2019-07-10 PROCEDURE — 36415 COLL VENOUS BLD VENIPUNCTURE: CPT

## 2019-07-10 PROCEDURE — 85610 PROTHROMBIN TIME: CPT

## 2019-07-10 NOTE — TELEPHONE ENCOUNTER
----- Message from Garry Pacheco sent at 7/10/2019  4:57 PM EDT -----  Continue same dose, recheck next Wednesday    Garry Pacheco

## 2019-07-11 ENCOUNTER — ANTICOAG VISIT (OUTPATIENT)
Dept: FAMILY MEDICINE CLINIC | Facility: CLINIC | Age: 84
End: 2019-07-11

## 2019-07-11 DIAGNOSIS — I48.0 PAROXYSMAL ATRIAL FIBRILLATION (HCC): ICD-10-CM

## 2019-07-17 ENCOUNTER — APPOINTMENT (OUTPATIENT)
Dept: LAB | Facility: CLINIC | Age: 84
End: 2019-07-17
Payer: MEDICARE

## 2019-07-17 ENCOUNTER — ANTICOAG VISIT (OUTPATIENT)
Dept: FAMILY MEDICINE CLINIC | Facility: CLINIC | Age: 84
End: 2019-07-17

## 2019-07-17 DIAGNOSIS — I48.0 PAROXYSMAL ATRIAL FIBRILLATION (HCC): ICD-10-CM

## 2019-07-17 LAB
INR PPP: 2.23 (ref 0.84–1.19)
INR PPP: 2.23 (ref 0.84–1.19)
PROTHROMBIN TIME: 24.2 SECONDS (ref 11.6–14.5)

## 2019-07-17 PROCEDURE — 36415 COLL VENOUS BLD VENIPUNCTURE: CPT

## 2019-07-17 PROCEDURE — 85610 PROTHROMBIN TIME: CPT

## 2019-07-18 ENCOUNTER — TELEPHONE (OUTPATIENT)
Dept: FAMILY MEDICINE CLINIC | Facility: CLINIC | Age: 84
End: 2019-07-18

## 2019-07-24 ENCOUNTER — ANTICOAG VISIT (OUTPATIENT)
Dept: FAMILY MEDICINE CLINIC | Facility: CLINIC | Age: 84
End: 2019-07-24

## 2019-07-24 ENCOUNTER — APPOINTMENT (OUTPATIENT)
Dept: LAB | Facility: CLINIC | Age: 84
End: 2019-07-24
Payer: MEDICARE

## 2019-07-24 ENCOUNTER — TELEPHONE (OUTPATIENT)
Dept: FAMILY MEDICINE CLINIC | Facility: CLINIC | Age: 84
End: 2019-07-24

## 2019-07-24 DIAGNOSIS — I48.0 PAROXYSMAL ATRIAL FIBRILLATION (HCC): ICD-10-CM

## 2019-07-24 LAB
INR PPP: 2.37 (ref 0.84–1.19)
PROTHROMBIN TIME: 25.4 SECONDS (ref 11.6–14.5)

## 2019-07-24 PROCEDURE — 36415 COLL VENOUS BLD VENIPUNCTURE: CPT

## 2019-07-24 PROCEDURE — 85610 PROTHROMBIN TIME: CPT

## 2019-07-24 NOTE — TELEPHONE ENCOUNTER
----- Message from Hazel Cornelius sent at 7/24/2019  2:05 PM EDT -----  Continue same dose recheck next Wed   Hazel Cornelius

## 2019-07-31 ENCOUNTER — APPOINTMENT (OUTPATIENT)
Dept: LAB | Facility: CLINIC | Age: 84
End: 2019-07-31
Payer: MEDICARE

## 2019-07-31 ENCOUNTER — ANTICOAG VISIT (OUTPATIENT)
Dept: FAMILY MEDICINE CLINIC | Facility: CLINIC | Age: 84
End: 2019-07-31

## 2019-07-31 DIAGNOSIS — I48.0 PAROXYSMAL ATRIAL FIBRILLATION (HCC): ICD-10-CM

## 2019-07-31 LAB
INR PPP: 2.32 (ref 0.84–1.19)
PROTHROMBIN TIME: 25 SECONDS (ref 11.6–14.5)

## 2019-07-31 PROCEDURE — 85610 PROTHROMBIN TIME: CPT

## 2019-07-31 PROCEDURE — 36415 COLL VENOUS BLD VENIPUNCTURE: CPT

## 2019-08-07 ENCOUNTER — APPOINTMENT (OUTPATIENT)
Dept: LAB | Facility: CLINIC | Age: 84
End: 2019-08-07
Payer: MEDICARE

## 2019-08-07 ENCOUNTER — ANTICOAG VISIT (OUTPATIENT)
Dept: FAMILY MEDICINE CLINIC | Facility: CLINIC | Age: 84
End: 2019-08-07

## 2019-08-07 DIAGNOSIS — I48.0 PAROXYSMAL ATRIAL FIBRILLATION (HCC): ICD-10-CM

## 2019-08-07 LAB
INR PPP: 2.28 (ref 0.84–1.19)
PROTHROMBIN TIME: 24.6 SECONDS (ref 11.6–14.5)

## 2019-08-07 PROCEDURE — 36415 COLL VENOUS BLD VENIPUNCTURE: CPT

## 2019-08-07 PROCEDURE — 85610 PROTHROMBIN TIME: CPT

## 2019-08-08 ENCOUNTER — OFFICE VISIT (OUTPATIENT)
Dept: RHEUMATOLOGY | Facility: CLINIC | Age: 84
End: 2019-08-08
Payer: MEDICARE

## 2019-08-08 VITALS
HEIGHT: 69 IN | HEART RATE: 98 BPM | BODY MASS INDEX: 23.1 KG/M2 | SYSTOLIC BLOOD PRESSURE: 150 MMHG | DIASTOLIC BLOOD PRESSURE: 82 MMHG

## 2019-08-08 DIAGNOSIS — M15.0 PRIMARY GENERALIZED (OSTEO)ARTHRITIS: ICD-10-CM

## 2019-08-08 DIAGNOSIS — Z79.899 HIGH RISK MEDICATION USE: ICD-10-CM

## 2019-08-08 DIAGNOSIS — M81.0 AGE-RELATED OSTEOPOROSIS WITHOUT CURRENT PATHOLOGICAL FRACTURE: ICD-10-CM

## 2019-08-08 DIAGNOSIS — M05.9 SEROPOSITIVE RHEUMATOID ARTHRITIS (HCC): Primary | ICD-10-CM

## 2019-08-08 PROCEDURE — 99214 OFFICE O/P EST MOD 30 MIN: CPT | Performed by: INTERNAL MEDICINE

## 2019-08-08 RX ORDER — FOLIC ACID 1 MG/1
1 TABLET ORAL DAILY
Qty: 30 TABLET | Refills: 11 | Status: SHIPPED | OUTPATIENT
Start: 2019-08-08 | End: 2019-12-30 | Stop reason: SDUPTHER

## 2019-08-08 NOTE — PROGRESS NOTES
Assessment and Plan:   Mr Howard Sides an 80-year-old  male with history significant for seropositive rheumatoid arthritis (positive rheumatoid factor and CCP antibody), generalized osteoarthritis and osteoporosis who presents for follow-up      # Seropositive rheumatoid arthritis, currently with severe activity, with evidence of chronic synovial hypertrophy and joint deformities  -Lalo Bonner is seen today in follow-up for the seropositive rheumatoid arthritis which is currently managed with hydroxychloroquine 200 mg twice daily  He has continued to experience arthralgias, with significant evidence of synovitis at multiple joints, and in view of this I do not think that the hydroxychloroquine is sufficient in controlling the rheumatoid arthritis  He had previously tolerated methotrexate well, but had self discontinued it for unclear reasons  I recommend restarting the methotrexate at 6 tablets weekly with folic acid 1 mg daily  I will plan to check a repeat CBC and CMP in 4 weeks after initiation of the methotrexate  He is up-to-date with chronic hepatitis testing      # Osteoporosis  - Prolia 60 mg was administered on 12/19/2018 and he tolerated it well  He was due for the next Prolia injection in June 2019, but he had requested to defer until today's appointment  Unfortunately as we do not have the Prolia available in stock in the office today, I advised him I will schedule him for a follow-up appointment to receive the injection once the office receives the Prolia  - I advised him to continue the calcium and vitamin-D supplements daily      # Left knee osteoarthritis  - He is most symptomatic at his left knee, which appears to be secondary to advanced degenerative arthritis  There is a large left knee effusion present, and I offered an arthrocentesis as well as intra-articular cortisone injection for symptomatic relief but he declines at this time    I advised him to call the office if he is interested in pursuing this option  He would like to hold off on a referral to Orthopedics as well for consideration of a total knee replacement surgery  Plan:  Diagnoses and all orders for this visit:    Seropositive rheumatoid arthritis ([address] )  -     methotrexate (RHEUMATREX) 2 5 MG tablet; Take 6 tablets (15 mg total) by mouth once a week  -     folic acid (FOLVITE) 1 mg tablet; Take 1 tablet (1 mg total) by mouth daily    Primary generalized (osteo)arthritis    Age-related osteoporosis without current pathological fracture    High risk medication use  -     CBC and differential; Future  -     Comprehensive metabolic panel; Future      Activities as tolerated    Diet: low carb/low fat, more greens/vegetables, adequate hydration  Exercise: try to maintain a low impact exercise regimen as much as possible  Walk for 30 minutes a day for at least 3 days a week    Encouraged to maintain good sleep hygiene  Continue other medications as prescribed by PCP and other specialists        RTC in 3 months  HPI    INITIAL VISIT NOTE:  Mr Viviane Amador is an 19-year-old male with history significant for seropositive rheumatoid arthritis (positive RF and CCP) who presents for initial evaluation and establishing with Rheumatology       Patient and his wife are present at today's visit and although they are both very coherent, there appears to be a slight lapse in recalling patients prior history  History today is obtained from patient, his wife, and also from review of his prior rheumatology records      He was diagnosed with seropositive rheumatoid arthritis about 15 years ago when he developed diffuse joint pains  He was first seen by Dr Nanette Devine (private practice rheumatologist) and was started on Hydroxychloroquine 400 mg daily along with low dose steroids, and it appears he was maintained on that regimen till about 2016, when he discontinued the medications as his symptoms had resolved   He remained mostly asymptomatic until earlier this year when he again experienced diffuse joint pains and swelling, but predominantly in his hands, wrists, shoulders and knees  Per the notes from his most recent rheumatologist, Dr Dixie Lucas from [facility], patient was restarted on Plaquenil and low dose prednisone, but patient is unaware of this and says he was not taking the medications  There was also conversation regarding step-up therapy to TNF-inhibitors, but unfortunately due to the cost Humira or Enbrel were never started  Overall, as per patient he is currently not on any medications for the rheumatoid arthritis      Currently, he states continued aching pain in his joints, mostly affecting his bilateral shoulders, left wrist, bilateral hands and left knee  He has swelling of his fingers, left wrist and knee as well  Morning stiffness lasts a few hours and slightly eases up but does not resolve completely  He denies fevers, sicca symptoms, mouth/nose ulcers, swollen glands, skin rash, abdominal pain, N/V/D or blood in stools  He does not get recurrent infections   A quantiferon checked in 7/2018 was negative          11/13/2018:  Patient is seen for follow-up today in view of seropositive rheumatoid arthritis with chronic deformities   He is currently on methotrexate 4 tablets weekly with folic acid 1 mg daily, hydroxychloroquine 200 mg b i d  and prednisone 5 mg daily      This combination of medications was started following his initial visit with me on 09/11, and he states there has been a significant improvement in his overall joint pains since starting this regimen   He noticed the improvement in the first few days, and states he only experiences occasional pain in his left hand 3rd and 4th digits, but otherwise denies any joint pains at this time   A few days ago he did have a twisting injury of his left knee which resulted in an occasional pain, but that is also improving   He does have chronic numbness down his left lower extremity  [facility] denies any acutely swollen joints and states morning stiffness has also improved   He is otherwise tolerating the medications well without any noticeable side effects such as fevers, chills, recurrent infections, mouth or nose ulcers or GI upset   Of note he does give a history of a chronic dry cough for the past 1 year which is associated with a runny nose, and this has previously been attributed to allergies   He has not had any recent chest x-rays done  [facility] has chronic shortness of breath on exertion which is unchanged   No chest pain   No other complaints at this time         12/11/2018:  Patient presents for follow-up today to discuss results of his bone density exam      He had a recent DEXA scan done on the 29th of November which showed osteoporosis and a T-score of -2 6 in the left hip  Jess Rubinstein is a 10 year risk of hip fracture being 11%, with a 10 year risk of major osteoporotic fracture being 20%   Patient denies any history of recent obvious fractures   He is willing to start medications for the osteoporosis  [facility] is currently not taking calcium or vitamin-D supplements      In terms of the rheumatoid arthritis he has been doing well on a combination of methotrexate 4 tablets weekly, with hydroxychloroquine 200 mg b i d  and prednisone 5 mg daily   He denies any significant joint pains or swelling at today's visit  Josafat Mancera did also review his labs following the prior visit which revealed improvement of his inflammatory markers, as well as an unremarkable CBC and CMP       He does report a head injury he sustained earlier today, and has a bruise at that site  [facility] denies any headaches, dizziness or changes in his vision   No other acute complaints at today's visit         3/12/2019:  Patient presents for follow-up of seropositive rheumatoid arthritis  [facility] is currently on hydroxychloroquine 200 mg twice daily  [facility] states since the prior office visit he has discontinued the methotrexate and prednisone, but cannot recall when this was done   We had not discussed discontinuing these medications      He states overall his joint pains have remained stable, but unfortunately 1 and half weeks ago he accidentally temporarily discontinued the Plaquenil and did notice a slight flare-up in joint pains   He has since restarted the hydroxychloroquine and except for ongoing pain in his left knee, denies any significant flare-up in joint pains   He has not noticed any swollen joints   He does continue to experience morning stiffness which lasts less than 1 hour, but does report gelling phenomenon   In view of the left knee pain he was seen by Orthopedics (Dr Ricke Mcburney offered intra-articular cortisone injections or Euflexxa, but patient would like to hold off for now  Marcy Madrid may also consider a knee replacement in the future, but would like to hold off on seeing Orthopedic surgery for now      No other complaints noted at this time         5/7/2019:  Patient presents for follow-up of seropositive rheumatoid arthritis  He is currently not on any medications  Unfortunately his wife did pass away a few days ago, and he discontinued all of his medications at that time, excluding the Coumadin  He states he has been coping well and does have the support of family nearby  He has had a loss of appetite and has lost a significant amount of weight      In terms of the joint pains, he has noticed a recurrence of pain affecting his hands, and has chronic pain affecting his left knee  He is not interested in pursuing an Orthopedics referral for consideration of a total knee replacement at this time  He does have occasional pain affecting his elbows and shoulders as well  He has noticed swelling of the left knee  He experiences morning stiffness which affects him diffusely and lasts about 30 minutes  He is not taking any over-the-counter pain medications  No other complaints noted at this time         8/8/2019:  Patient presents for follow-up of seropositive rheumatoid arthritis  He is currently on hydroxychloroquine 200 mg twice daily  There has been no significant change in his joint pains noted with the hydroxychloroquine, although he admits to intermittently being noncompliant and forgetful in taking his medications  He continues to experience pain most prominently at his right shoulder and left knee  He states his hands and wrists do not really bother him in terms of pain, but he does have difficulty with gripping items and with strength  He has noticed swelling affecting his hands and left knee  He experiences morning stiffness which affects him diffusely and lasts a few minutes  He would like to continue holding off on seeing Orthopedics for the left knee osteoarthritis  He reports overall he has been doing well after the passing of his wife, and has been busy with his family and friends  Of note, he is due for his 2nd Prolia injection today, but unfortunately as we do not have any in stock he will reschedule an appointment for this  He tolerated the 1st injection well without any concerns for side effects  The following portions of the patient's history were reviewed and updated as appropriate: allergies, current medications, past family history, past medical history, past social history, past surgical history and problem list       Review of Systems  Constitutional: Negative for fevers, chills, night sweats, fatigue  Positive for weight loss  ENT/Mouth: Negative for ear pain, nasal congestion, sinus pain, hoarseness, rhinorrhea, swallowing difficulty  Positive for loss of hearing and sore throat  Eyes: Negative for pain, redness, discharge, vision changes  Cardiovascular: Negative for chest pain, SOB, palpitations  Respiratory: Negative for sputum, wheezing, dyspnea  Positive for cough  Gastrointestinal: Negative for nausea, vomiting, diarrhea, constipation, pain, heartburn    Genitourinary: Negative for dysuria, hematuria  Positive for urinary frequency  Musculoskeletal: As per HPI  Skin: Negative for skin rash, color changes  Neuro: Negative for weakness, numbness, tingling, loss of consciousness  Psych: Negative for anxiety, depression  Heme/Lymph: Negative for easy bruising, bleeding, lymphadenopathy          Past Medical History:   Diagnosis Date    CAD (coronary artery disease)     Cardiomyopathy ([facility] )     Dupuytren contracture     Dyslipidemia     Essential hypertension     ICD (implantable cardioverter-defibrillator) in place     Lumbosacral radiculopathy at S1     Osteoarthritis     Rheumatoid arthritis (HCC)     SNHL (sensorineural hearing loss)     Spinal stenosis     Type 2 diabetes mellitus ([place] [id number] )        Past Surgical History:   Procedure Laterality Date    AORTIC VALVE REPLACEMENT      CHOLECYSTECTOMY         Social History     Socioeconomic History    Marital status: /Civil Union     Spouse name: Not on file    Number of children: Not on file    Years of education: Not on file    Highest education level: Not on file   Occupational History    Not on file   Social Needs    Financial resource strain: Not on file    Food insecurity:     Worry: Not on file     Inability: Not on file    Transportation needs:     Medical: Not on file     Non-medical: Not on file   Tobacco Use    Smoking status: Former Smoker     Last attempt to quit:      Years since quittin 6    Smokeless tobacco: Never Used   Substance and Sexual Activity    Alcohol use: No    Drug use: No    Sexual activity: Not on file   Lifestyle    Physical activity:     Days per week: Not on file     Minutes per session: Not on file    Stress: Not on file   Relationships    Social connections:     Talks on phone: Not on file     Gets together: Not on file     Attends Congregation service: Not on file     Active member of club or organization: Not on file     Attends meetings of clubs or organizations: Not on file     Relationship status: Not on file    Intimate partner violence:     Fear of current or ex partner: Not on file     Emotionally abused: Not on file     Physically abused: Not on file     Forced sexual activity: Not on file   Other Topics Concern    Not on file   Social History Narrative    Not on file       Family History   Problem Relation Age of Onset    Heart disease Brother     Cancer Brother     COPD Father     Diabetes Mother        No Known Allergies      Current Outpatient Medications:     amLODIPine (NORVASC) 10 mg tablet, Take 0 5 tablets (5 mg total) by mouth daily, Disp: 45 tablet, Rfl: 1    benazepril (LOTENSIN) 5 mg tablet, Take 5 mg by mouth, Disp: , Rfl:     calcium carbonate-vitamin D (OSCAL-D) 500 mg-200 units per tablet, Take 1 tablet by mouth 2 (two) times a day with meals, Disp: 60 tablet, Rfl: 11    fluticasone (FLONASE) 50 mcg/act nasal spray, 2 sprays into each nostril daily, Disp: 16 g, Rfl: 1    gabapentin (NEURONTIN) 100 mg capsule, Take 1 capsule (100 mg total) by mouth 2 (two) times a day, Disp: 180 capsule, Rfl: 1    hydroxychloroquine (PLAQUENIL) 200 mg tablet, Take 1 tablet (200 mg total) by mouth 2 (two) times a day with meals for 180 days, Disp: 60 tablet, Rfl: 5    metoprolol succinate (TOPROL XL) 50 mg 24 hr tablet, Take 1 tablet (50 mg total) by mouth 2 (two) times a day, Disp: 180 tablet, Rfl: 1    OS-ROD CALCIUM + D3 500-200 MG-UNIT TABS, , Disp: , Rfl: 1    potassium chloride (K-DUR,KLOR-CON) 10 mEq tablet, Take 10 mEq by mouth, Disp: , Rfl:     rosuvastatin (CRESTOR) 10 MG tablet, 1 by mouth daily, Disp: , Rfl:     warfarin (COUMADIN) 2 mg tablet, 2 mg alternating with 3 mg daily or as directed, Disp: 90 tablet, Rfl: 1    warfarin (COUMADIN) 3 mg tablet, Take 1 tablet (3 mg total) by mouth daily OR AS DIRECTED, Disp: 90 tablet, Rfl: 3    warfarin (COUMADIN) 4 mg tablet, Take 1 tablet (4 mg total) by mouth daily, Disp: 90 tablet, Rfl: 3    folic acid (FOLVITE) 1 mg tablet, Take 1 tablet (1 mg total) by mouth daily, Disp: 30 tablet, Rfl: 11    methotrexate (RHEUMATREX) 2 5 MG tablet, Take 6 tablets (15 mg total) by mouth once a week, Disp: 24 tablet, Rfl: 2      Objective:    Vitals:    08/08/19 1107   BP: 150/82   BP Location: Left arm   Patient Position: Sitting   Cuff Size: Standard   Pulse: 98   Height: 5' 9" (1 753 m)       Physical Exam  General: Well appearing, well nourished, in no distress  Oriented x 3, normal mood and affect  Ambulating with aid of a cane  Skin: Good turgor, no rash, unusual bruising or prominent lesions  Nails: Normal color, no deformities  HEENT:  Head: Normocephalic, atraumatic  Eyes: Conjunctiva clear, sclera non-icteric, EOM intact  Nose: No external lesions, mucosa non-inflamed  Mouth: Mucous membranes moist, no mucosal lesions  Extremities: No amputations or deformities, cyanosis, edema  Musculoskeletal:   Hands - he has significant soft tissue swelling present at his 2nd through 4th PIPs bilaterally, but most prominent at the right hand 3rd PIP and left hand 4th PIP, as well as at the left hand 2nd MCP  He is tender to palpation of these joints  There are also osteoarthritic changes present at his DIPs bilaterally  Right 5th digit has a fixed boutonniere deformity  There is slight ulnar deviation noted of his right hand  Wrists - there is soft tissue swelling present at his bilateral wrists, but no tenderness or restriction in range of motion  Elbows - chronic synovial hypertrophy noted more significantly at his right elbow  He has limitation in full extension of his right elbow  There is no tenderness noted  Shoulders - nontender without any soft tissue swelling, but he has limited active abduction till 90° of his right shoulder  Knees - there is a large left knee effusion present without warmth, erythema or significant tenderness  He has crepitus present bilaterally    There is also bony enlargement present bilaterally  Ankles and feet - nontender with negative MTP squeeze test bilaterally  Neurologic: Alert and oriented  No focal neurological deficits appreciated  Psychiatric: Normal mood and affect  NAVYA Araujo    Rheumatology

## 2019-08-09 ENCOUNTER — TELEPHONE (OUTPATIENT)
Dept: OBGYN CLINIC | Facility: HOSPITAL | Age: 84
End: 2019-08-09

## 2019-08-09 NOTE — TELEPHONE ENCOUNTER
Patient is calling to find out if he should get his lab work done now or wait until closer to his next appointment? Please advise      Please call the home phone [phone]

## 2019-08-12 ENCOUNTER — TELEPHONE (OUTPATIENT)
Dept: OBGYN CLINIC | Facility: CLINIC | Age: 84
End: 2019-08-12

## 2019-08-12 ENCOUNTER — TELEPHONE (OUTPATIENT)
Dept: FAMILY MEDICINE CLINIC | Facility: CLINIC | Age: 84
End: 2019-08-12

## 2019-08-12 NOTE — TELEPHONE ENCOUNTER
Attempted to call pt and schedule prolia injection for either Thursday or Friday this week  Since it is a injection visits is it ok to double book an appt slot? Pt did not answer could not leave vm   Please advise if it is ok to double book

## 2019-08-12 NOTE — TELEPHONE ENCOUNTER
Not so sure  Its based on risk benefit    If he is having a cleaning  , no if he is having other things maybe

## 2019-08-12 NOTE — TELEPHONE ENCOUNTER
extraction and immediate insert is being done on the 20th   He needs to be off for 3 days so he would stop on the 17th  Coumadin was stopped before for previous procedures    Please advise  Halima Gonzalez [place]          Dentist office #-[phone]

## 2019-08-12 NOTE — TELEPHONE ENCOUNTER
Radha Narayan saw the dentist today and was told to stop his coumadin three days prior to his dental work on April 20  Can we ask [place] if this is ok?   Thank you

## 2019-08-14 ENCOUNTER — TELEPHONE (OUTPATIENT)
Dept: FAMILY MEDICINE CLINIC | Facility: CLINIC | Age: 84
End: 2019-08-14

## 2019-08-14 ENCOUNTER — ANTICOAG VISIT (OUTPATIENT)
Dept: FAMILY MEDICINE CLINIC | Facility: CLINIC | Age: 84
End: 2019-08-14

## 2019-08-14 ENCOUNTER — APPOINTMENT (OUTPATIENT)
Dept: LAB | Facility: CLINIC | Age: 84
End: 2019-08-14
Payer: MEDICARE

## 2019-08-14 DIAGNOSIS — I48.0 PAROXYSMAL ATRIAL FIBRILLATION (HCC): ICD-10-CM

## 2019-08-14 DIAGNOSIS — I48.0 PAROXYSMAL ATRIAL FIBRILLATION (HCC): Primary | ICD-10-CM

## 2019-08-14 LAB
INR PPP: 1.84 (ref 0.84–1.19)
PROTHROMBIN TIME: 20.8 SECONDS (ref 11.6–14.5)

## 2019-08-14 PROCEDURE — 36415 COLL VENOUS BLD VENIPUNCTURE: CPT

## 2019-08-14 PROCEDURE — 85610 PROTHROMBIN TIME: CPT

## 2019-08-14 RX ORDER — WARFARIN SODIUM 1 MG/1
TABLET ORAL
Qty: 90 TABLET | Refills: 1 | Status: SHIPPED | OUTPATIENT
Start: 2019-08-14

## 2019-08-14 NOTE — TELEPHONE ENCOUNTER
----- Message from Harman Bray DO sent at 8/14/2019  4:20 PM EDT -----  Change dose to 3 5 mg daily redraw in 2 days

## 2019-08-14 NOTE — TELEPHONE ENCOUNTER
Spoke with pt  Aware of DR Lombardi's noteNote, lashell shannon complete  Pt states will be going to dentist 8/20/2019 and dentist want pt to stop warfarin 2 days prior to dental appointment      Poncho Coates MA

## 2019-08-14 NOTE — TELEPHONE ENCOUNTER
Pt called again, States does not have   5   Please order 1mg     Per DR Lombardi, Pt to take 3mg today  3 5 Thur-Sunday adam Monday am     Marcia Pollock

## 2019-08-15 ENCOUNTER — TELEPHONE (OUTPATIENT)
Dept: FAMILY MEDICINE CLINIC | Facility: CLINIC | Age: 84
End: 2019-08-15

## 2019-08-15 NOTE — TELEPHONE ENCOUNTER
----- Message from Catarina Bloch, DO sent at 8/14/2019  4:20 PM EDT -----  Change dose to 3 5 mg daily redraw in 2 days

## 2019-08-15 NOTE — TELEPHONE ENCOUNTER
Spoke with CHAD he will come in tomorrow at [address] will email kwadwo to open a spot

## 2019-08-15 NOTE — TELEPHONE ENCOUNTER
Pt  starting  Wednesday 8/14/2019   3mg wednesday  3 5mg thur-sat  hold sun and mon before dentist  3 5 tues and wed  Recheck Thursday the 22nd 8/22/2019    Pt may call back if any questions with INR directions  I spoke to pt 4 times last night with directions      Britton Marks MA

## 2019-08-16 ENCOUNTER — OFFICE VISIT (OUTPATIENT)
Dept: RHEUMATOLOGY | Facility: CLINIC | Age: 84
End: 2019-08-16
Payer: MEDICARE

## 2019-08-16 VITALS — SYSTOLIC BLOOD PRESSURE: 111 MMHG | HEART RATE: 71 BPM | DIASTOLIC BLOOD PRESSURE: 61 MMHG

## 2019-08-16 DIAGNOSIS — M81.0 AGE-RELATED OSTEOPOROSIS WITHOUT CURRENT PATHOLOGICAL FRACTURE: Primary | ICD-10-CM

## 2019-08-16 PROCEDURE — 96372 THER/PROPH/DIAG INJ SC/IM: CPT | Performed by: INTERNAL MEDICINE

## 2019-08-16 NOTE — PROGRESS NOTES
Patient came in today for repeat Prolia injection  Patient tolerated injection well there were no immediate complications  Post injections intrusions were provided

## 2019-08-22 ENCOUNTER — APPOINTMENT (OUTPATIENT)
Dept: LAB | Facility: CLINIC | Age: 84
End: 2019-08-22
Payer: MEDICARE

## 2019-08-22 ENCOUNTER — ANTICOAG VISIT (OUTPATIENT)
Dept: FAMILY MEDICINE CLINIC | Facility: CLINIC | Age: 84
End: 2019-08-22

## 2019-08-22 DIAGNOSIS — I48.0 PAROXYSMAL ATRIAL FIBRILLATION (HCC): ICD-10-CM

## 2019-08-22 LAB
INR PPP: 1.56 (ref 0.84–1.19)
PROTHROMBIN TIME: 18.2 SECONDS (ref 11.6–14.5)

## 2019-08-22 PROCEDURE — 36415 COLL VENOUS BLD VENIPUNCTURE: CPT

## 2019-08-22 PROCEDURE — 85610 PROTHROMBIN TIME: CPT

## 2019-08-28 ENCOUNTER — ANTICOAG VISIT (OUTPATIENT)
Dept: FAMILY MEDICINE CLINIC | Facility: CLINIC | Age: 84
End: 2019-08-28

## 2019-08-28 ENCOUNTER — APPOINTMENT (OUTPATIENT)
Dept: LAB | Facility: CLINIC | Age: 84
End: 2019-08-28
Payer: MEDICARE

## 2019-08-28 DIAGNOSIS — I48.0 PAROXYSMAL ATRIAL FIBRILLATION (HCC): ICD-10-CM

## 2019-08-28 LAB
INR PPP: 1.85 (ref 0.84–1.19)
INR PPP: 1.85 (ref 0.84–1.19)
PROTHROMBIN TIME: 20.9 SECONDS (ref 11.6–14.5)

## 2019-08-28 PROCEDURE — 85610 PROTHROMBIN TIME: CPT

## 2019-08-28 PROCEDURE — 36415 COLL VENOUS BLD VENIPUNCTURE: CPT

## 2019-08-29 ENCOUNTER — OFFICE VISIT (OUTPATIENT)
Dept: PODIATRY | Facility: CLINIC | Age: 84
End: 2019-08-29
Payer: MEDICARE

## 2019-08-29 VITALS — BODY MASS INDEX: 23.11 KG/M2 | WEIGHT: 156 LBS | RESPIRATION RATE: 17 BRPM | HEIGHT: 69 IN

## 2019-08-29 DIAGNOSIS — I70.209 PERIPHERAL ARTERIOSCLEROSIS (HCC): ICD-10-CM

## 2019-08-29 DIAGNOSIS — M79.671 PAIN IN BOTH FEET: ICD-10-CM

## 2019-08-29 DIAGNOSIS — B35.1 ONYCHOMYCOSIS: ICD-10-CM

## 2019-08-29 DIAGNOSIS — M79.672 PAIN IN BOTH FEET: ICD-10-CM

## 2019-08-29 DIAGNOSIS — E11.42 DIABETIC POLYNEUROPATHY ASSOCIATED WITH TYPE 2 DIABETES MELLITUS (HCC): Primary | ICD-10-CM

## 2019-08-29 PROCEDURE — 11721 DEBRIDE NAIL 6 OR MORE: CPT | Performed by: PODIATRIST

## 2019-08-29 NOTE — PROGRESS NOTES
Signed  Encounter Date: 6/6/2019   Assessment/Plan:  Pain upon ambulation   Peripheral artery disease   Diabetic neuropathy   Mycosis of nail      Plan   Diabetic foot exam performed   All mycotic nails debrided without pain or complication      No problem-specific Assessment & Plan notes found for this encounter          There are no diagnoses linked to this encounter        Subjective:  patient complains of pain in his toes and feet with ambulation   No history of trauma             Past Medical History:   Diagnosis Date    CAD (coronary artery disease)      Cardiomyopathy ([facility] )      Dupuytren contracture      Dyslipidemia      Essential hypertension      ICD (implantable cardioverter-defibrillator) in place      Lumbosacral radiculopathy at S1      Osteoarthritis      Rheumatoid arthritis ([facility] )      SNHL (sensorineural hearing loss)      Spinal stenosis      Type 2 [facility] mellitus [facility] )                     Past Surgical History:   Procedure Laterality Date    AORTIC VALVE REPLACEMENT        CHOLECYSTECTOMY             No Known Allergies        Current Outpatient Prescriptions:     albuterol (PROVENTIL HFA,VENTOLIN HFA) 90 mcg/act inhaler, Inhale 2 puffs, Disp: , Rfl:     amLODIPine (NORVASC) 10 mg tablet, take 1/2  tablet daily, Disp: , Rfl:     benazepril (LOTENSIN) 5 mg tablet, Take 5 mg by mouth, Disp: , Rfl:     fluticasone (FLONASE) 50 mcg/act nasal spray, 1 spray into each nostril daily, Disp: , Rfl:     folic acid (FOLVITE) 1 mg tablet, Take 1 mg by mouth, Disp: , Rfl:     gabapentin (NEURONTIN) 100 mg capsule, Take 100 mg by mouth, Disp: , Rfl:     glucose blood (ONE TOUCH ULTRA TEST) test strip, Check sugar daily and as needed  Dx: E11 9, Disp: , Rfl:     glucose blood test strip, Check sugar daily and as needed  Dx: E11 9, Disp: , Rfl:     hydroxychloroquine (PLAQUENIL) 200 mg tablet, Take 1 tablet (200 mg total) by mouth 2 (two) times a day with meals for 30 days, Disp: 60 tablet, Rfl: 5    Lancet Devices (ONE TOUCH DELICA LANCING DEV) MISC, Check sugar daily and as needed  Dx: E11 9, Disp: , Rfl:     methotrexate (RHEUMATREX) 2 5 MG tablet, Take 4 tablets (10 mg total) by mouth once a week, Disp: 16 tablet, Rfl: 2    metoprolol succinate (TOPROL XL) 50 mg 24 hr tablet, Take 50 mg by mouth, Disp: , Rfl:     ONETOUCH DELICA LANCETS 15J MISC, Check sugar daily and as needed  Dx: E11 9, Disp: , Rfl:     potassium chloride (K-DUR,KLOR-CON) 10 mEq tablet, Take 10 mEq by mouth, Disp: , Rfl:     PredniSONE 5 MG TBEC, Take 1 tablet (5 mg total) by mouth daily, Disp: 30 tablet, Rfl: 5    rosuvastatin (CRESTOR) 10 MG tablet, 1 by mouth daily, Disp: , Rfl:     warfarin (COUMADIN) 2 mg tablet, Take 2 mg on Mondays and Fridays and 4 mg all other days of the week or as directed, Disp: , Rfl:            Patient Active Problem List   Diagnosis    Patient refuses to disclose advance directive information    Bilateral sensorineural hearing loss    Biventricular ICD (implantable cardioverter-defibrillator) in place    [facility]    CHB (complete heart block) ([facility]    Coronary artery disease involving native coronary artery of native heart without angina pectoris    Dupuytren contracture    Dyslipidemia, goal LDL below 70    Hyperplasia of prostate with lower urinary tract symptoms (LUTS)    Former smoker    Essential hypertension    Lymphedema of left leg    Osteoarthritis of multiple joints    Pacemaker    Paroxysmal atrial fibrillation (HCC)    RA (rheumatoid arthritis) ([facility]    Restrictive lung disease    RBBB    S/P AVR (aortic valve replacement)    S/P primary angioplasty with coronary stent    Spinal stenosis of lumbar region without neurogenic claudication    Type 2 diabetes mellitus with hemoglobin A1c goal of less than 7 0% (HCC)    Abnormal TSH             Patient ID: Nitin Burgos is a 80 y  o  male      HPI     The following portions of the patient's history were reviewed and updated as appropriate: allergies, current medications, past family history, past medical history, past social history, past surgical history and problem list      Review of Systems       Objective:  Patient's shoes and socks removed    Foot Exam     Right Foot/Ankle      Inspection and Palpation  Skin Exam: dry skin;      Neurovascular  Dorsalis pedis: 1+  Posterior tibial: 1+        Left Foot/Ankle       Inspection and Palpation  Skin Exam: dry skin;      Neurovascular  Dorsalis pedis: 1+  Posterior tibial: 1+        Physical Exam   Cardiovascular: Pulses are weak pulses  Pulses:       Dorsalis pedis pulses are 1+ on the right side, and 1+ on the left side         Posterior tibial pulses are 1+ on the right side, and 1+ on the left side  Feet:   Right Foot:   Skin Integrity: Positive for warmth and dry skin  Left Foot:   Skin Integrity: Positive for warmth and dry skin  Patient's shoes and socks removed  Right Foot/Ankle   Right Foot Inspection  Skin Exam: dry skin and warmth               Toe Exam: swelling and erythema  Sensory   Vibration: diminished  Proprioception: diminished   Monofilament testing: intact  Vascular  Capillary refills: elevated  The right DP pulse is 1+  The right PT pulse is 1+       Left Foot/Ankle  Left Foot Inspection  Skin Exam: dry skin and warmth                 Sensory   Vibration: diminished  Proprioception: diminished  Monofilament: intact  Vascular  Capillary refills: elevated  The left DP pulse is 1+  The left PT pulse is 1+       Patient's shoes and socks removed  Right Foot/Ankle   Right Foot Inspection  Skin Exam: dry skin                           Left Foot/Ankle  Left Foot Inspection  Skin Exam: dry skin     Patient's shoes and socks removed  Assign Risk Category:  Deformity present; Loss of protective sensation; Weak pulses       Risk: 2

## 2019-09-04 ENCOUNTER — APPOINTMENT (OUTPATIENT)
Dept: LAB | Facility: CLINIC | Age: 84
End: 2019-09-04
Payer: MEDICARE

## 2019-09-04 ENCOUNTER — ANTICOAG VISIT (OUTPATIENT)
Dept: FAMILY MEDICINE CLINIC | Facility: CLINIC | Age: 84
End: 2019-09-04

## 2019-09-04 DIAGNOSIS — I48.0 PAROXYSMAL ATRIAL FIBRILLATION (HCC): ICD-10-CM

## 2019-09-04 LAB
INR PPP: 2.3 (ref 0.84–1.19)
PROTHROMBIN TIME: 24.8 SECONDS (ref 11.6–14.5)

## 2019-09-04 PROCEDURE — 85610 PROTHROMBIN TIME: CPT

## 2019-09-04 PROCEDURE — 36415 COLL VENOUS BLD VENIPUNCTURE: CPT

## 2019-09-11 ENCOUNTER — ANTICOAG VISIT (OUTPATIENT)
Dept: FAMILY MEDICINE CLINIC | Facility: CLINIC | Age: 84
End: 2019-09-11

## 2019-09-11 ENCOUNTER — APPOINTMENT (OUTPATIENT)
Dept: LAB | Facility: CLINIC | Age: 84
End: 2019-09-11
Payer: MEDICARE

## 2019-09-11 DIAGNOSIS — I48.0 PAROXYSMAL ATRIAL FIBRILLATION (HCC): ICD-10-CM

## 2019-09-11 LAB
INR PPP: 2.68 (ref 0.84–1.19)
INR PPP: 2.68 (ref 0.84–1.19)
PROTHROMBIN TIME: 28 SECONDS (ref 11.6–14.5)

## 2019-09-11 PROCEDURE — 36415 COLL VENOUS BLD VENIPUNCTURE: CPT

## 2019-09-11 PROCEDURE — 85610 PROTHROMBIN TIME: CPT

## 2019-09-12 ENCOUNTER — OFFICE VISIT (OUTPATIENT)
Dept: CARDIOLOGY CLINIC | Facility: CLINIC | Age: 84
End: 2019-09-12
Payer: MEDICARE

## 2019-09-12 VITALS
SYSTOLIC BLOOD PRESSURE: 112 MMHG | WEIGHT: 146 LBS | HEART RATE: 93 BPM | HEIGHT: 69 IN | BODY MASS INDEX: 21.62 KG/M2 | OXYGEN SATURATION: 94 % | DIASTOLIC BLOOD PRESSURE: 62 MMHG

## 2019-09-12 DIAGNOSIS — I44.2 CHB (COMPLETE HEART BLOCK) (HCC): ICD-10-CM

## 2019-09-12 DIAGNOSIS — Z95.0 PRESENCE OF PERMANENT CARDIAC PACEMAKER: ICD-10-CM

## 2019-09-12 DIAGNOSIS — Z95.810 BIVENTRICULAR ICD (IMPLANTABLE CARDIOVERTER-DEFIBRILLATOR) IN PLACE: ICD-10-CM

## 2019-09-12 DIAGNOSIS — J98.4 RESTRICTIVE LUNG DISEASE: ICD-10-CM

## 2019-09-12 DIAGNOSIS — I48.0 PAROXYSMAL ATRIAL FIBRILLATION (HCC): ICD-10-CM

## 2019-09-12 DIAGNOSIS — I45.10 RBBB: ICD-10-CM

## 2019-09-12 DIAGNOSIS — I48.0 PAF (PAROXYSMAL ATRIAL FIBRILLATION) (HCC): Primary | ICD-10-CM

## 2019-09-12 DIAGNOSIS — I10 ESSENTIAL HYPERTENSION: ICD-10-CM

## 2019-09-12 DIAGNOSIS — I25.10 CORONARY ARTERY DISEASE INVOLVING NATIVE CORONARY ARTERY OF NATIVE HEART WITHOUT ANGINA PECTORIS: ICD-10-CM

## 2019-09-12 PROCEDURE — 99213 OFFICE O/P EST LOW 20 MIN: CPT | Performed by: INTERNAL MEDICINE

## 2019-09-12 PROCEDURE — 93000 ELECTROCARDIOGRAM COMPLETE: CPT | Performed by: INTERNAL MEDICINE

## 2019-09-12 NOTE — PROGRESS NOTES
Cardiology Followup    Mert Lai  [id number]  1934      Interval History: Mert Lai is a 80y o  year old male who is here to follow up Congestive heart failure  Patient has  History of coronary disease with congestive heart failure  He has no complaints at this time  He denies any chest pain or shortness of breath  He also denies any LE edema, orthopnea or PND  Patient underwent stress test and 2D echocardiogram because he has a history of coronary artery disease with previous CABG and aortic valve replacement  Stress test was normal without ischemia or infarction noted  Echocardiogram showed ejection fraction of 43%, grade 1 diastolic dysfunction, normal functioning bioprosthetic aortic valve with a mean gradient of 6 mm Hg  He was previously seeing a cardiologist at [facility]  Previous records were reviewed through the Care [facility] system  According to the patient, he had a myocardial infarction in 1995  Shortly afterwards, he had a stent placed to an unknown vessel  In December 2011, he had an aortic valve replacement (25 mm trifecta tissue valve) and may have had bypass at that time as he had vein removed from his left leg  Procedure was complicated by complete heart block and he had pacemaker inserted at that time  Pacemaker was upgraded to a biventricular ICD because of reduction of ejection fraction after RV pacing  Subsequent ejection fraction improved  Patient is on Coumadin  for paroxysmal atrial fibrillation but denies history of palpitations (does not recall ever having atrial fibrillation)  Echocardiogram in March 2017 showed ejection fraction of 50-55% mild mitral regurgitation and normal aortic valve gradient        Past Medical History:   Diagnosis Date    CAD (coronary artery disease)     Cardiomyopathy ([address] )     Dupuytren contracture     Dyslipidemia     Essential hypertension     ICD (implantable cardioverter-defibrillator) in place     Lumbosacral radiculopathy at S1     Osteoarthritis     Rheumatoid arthritis (HCC)     SNHL (sensorineural hearing loss)     Spinal stenosis     Type 2 diabetes mellitus ([facility] )      Social History     Socioeconomic History    Marital status: /Civil Union     Spouse name: Not on file    Number of children: Not on file    Years of education: Not on file    Highest education level: Not on file   Occupational History    Not on file   Social Needs    Financial resource strain: Not on file    Food insecurity:     Worry: Not on file     Inability: Not on file    Transportation needs:     Medical: Not on file     Non-medical: Not on file   Tobacco Use    Smoking status: Former Smoker     Last attempt to quit:      Years since quittin 7    Smokeless tobacco: Never Used   Substance and Sexual Activity    Alcohol use: No    Drug use: No    Sexual activity: Not on file   Lifestyle    Physical activity:     Days per week: Not on file     Minutes per session: Not on file    Stress: Not on file   Relationships    Social connections:     Talks on phone: Not on file     Gets together: Not on file     Attends Synagogue service: Not on file     Active member of club or organization: Not on file     Attends meetings of clubs or organizations: Not on file     Relationship status: Not on file    Intimate partner violence:     Fear of current or ex partner: Not on file     Emotionally abused: Not on file     Physically abused: Not on file     Forced sexual activity: Not on file   Other Topics Concern    Not on file   Social History Narrative    Not on file      Family History   Problem Relation Age of Onset    Heart disease Brother     Cancer Brother     COPD Father     Diabetes Mother      Past Surgical History:   Procedure Laterality Date    AORTIC VALVE REPLACEMENT      CHOLECYSTECTOMY         Current Outpatient Medications:     amLODIPine (NORVASC) 10 mg tablet, Take 0 5 tablets (5 mg total) by mouth daily, Disp: 45 tablet, Rfl: 1    aspirin 81 MG tablet, Take 81 mg by mouth every morning, Disp: , Rfl:     calcium carbonate-vitamin D (OSCAL-D) 500 mg-200 units per tablet, Take 1 tablet by mouth 2 (two) times a day with meals, Disp: 60 tablet, Rfl: 11    folic acid (FOLVITE) 1 mg tablet, Take 1 tablet (1 mg total) by mouth daily, Disp: 30 tablet, Rfl: 11    gabapentin (NEURONTIN) 100 mg capsule, Take 1 capsule (100 mg total) by mouth 2 (two) times a day, Disp: 180 capsule, Rfl: 1    hydroxychloroquine (PLAQUENIL) 200 mg tablet, Take 1 tablet (200 mg total) by mouth 2 (two) times a day with meals for 180 days, Disp: 60 tablet, Rfl: 5    metoprolol succinate (TOPROL XL) 50 mg 24 hr tablet, Take 1 tablet (50 mg total) by mouth 2 (two) times a day, Disp: 180 tablet, Rfl: 1    rosuvastatin (CRESTOR) 10 MG tablet, 1 by mouth daily, Disp: , Rfl:     warfarin (COUMADIN) 1 mg tablet, 1/2 tab daily with other coumadin pill, and or as directed, Disp: 90 tablet, Rfl: 1    warfarin (COUMADIN) 2 mg tablet, 2 mg alternating with 3 mg daily or as directed, Disp: 90 tablet, Rfl: 1    warfarin (COUMADIN) 3 mg tablet, Take 1 tablet (3 mg total) by mouth daily OR AS DIRECTED, Disp: 90 tablet, Rfl: 3    warfarin (COUMADIN) 4 mg tablet, Take 1 tablet (4 mg total) by mouth daily, Disp: 90 tablet, Rfl: 3    benazepril (LOTENSIN) 5 mg tablet, Take 5 mg by mouth, Disp: , Rfl:     fluticasone (FLONASE) 50 mcg/act nasal spray, 2 sprays into each nostril daily (Patient not taking: Reported on 9/12/2019), Disp: 16 g, Rfl: 1    methotrexate (RHEUMATREX) 2 5 MG tablet, Take 6 tablets (15 mg total) by mouth once a week (Patient not taking: Reported on 9/12/2019), Disp: 24 tablet, Rfl: 2    OS-ROD CALCIUM + D3 500-200 MG-UNIT TABS, , Disp: , Rfl: 1    potassium chloride (K-DUR,KLOR-CON) 10 mEq tablet, Take 10 mEq by mouth, Disp: , Rfl:   No Known Allergies      Review of Systems:  Review of Systems Musculoskeletal: Positive for arthralgias and gait problem  All other systems reviewed and are negative  Physical Exam:  Vitals:    09/12/19 1040   BP: 112/62   BP Location: Right arm   Patient Position: Sitting   Cuff Size: Standard   Pulse: 93   SpO2: 94%   Weight: 66 2 kg (146 lb)   Height: 5' 9" (1 753 m)     Physical Exam   Constitutional: He is oriented to person, place, and time  He appears well-developed  No distress  HENT:   Head: Normocephalic and atraumatic  Eyes: Pupils are equal, round, and reactive to light  Conjunctivae and EOM are normal    Neck: Neck supple  No JVD present  No thyromegaly present  Cardiovascular: Normal rate and regular rhythm  Exam reveals no gallop and no friction rub  Murmur heard  Pulmonary/Chest: Effort normal and breath sounds normal    Abdominal: Soft  He exhibits no distension  There is no tenderness  Musculoskeletal: He exhibits no edema  Neurological: He is alert and oriented to person, place, and time  No cranial nerve deficit  Skin: Skin is warm and dry  No rash noted  He is not diaphoretic  No erythema  Psychiatric: He has a normal mood and affect  His behavior is normal  Judgment and thought content normal        Labs:   Anticoag visit on 09/11/2019   Component Date Value    INR 09/11/2019 2 68*   Appointment on 09/11/2019   Component Date Value    Protime 09/11/2019 28 0*    INR 09/11/2019 2 68*   Appointment on 09/04/2019   Component Date Value    Protime 09/04/2019 24 8*    INR 09/04/2019 2 30*   Anticoag visit on 08/28/2019   Component Date Value    INR 08/28/2019 1 85*   Appointment on 08/28/2019   Component Date Value    Protime 08/28/2019 20 9*    INR 08/28/2019 1 85*   Appointment on 08/22/2019   Component Date Value    Protime 08/22/2019 18 2*    INR 08/22/2019 1 56*   Anticoag visit on 08/14/2019   Component Date Value    INR 08/14/2019 1 84*    INR 08/14/2019 1 84*   Appointment on 08/14/2019   Component Date Value    Protime 08/14/2019 20 8*    INR 08/14/2019 1 84*   Appointment on 08/07/2019   Component Date Value    Protime 08/07/2019 24 6*    INR 08/07/2019 2 28*   Appointment on 07/31/2019   Component Date Value    Protime 07/31/2019 25 0*    INR 07/31/2019 2 32*   There may be more visits with results that are not included  Imaging: Xr Chest Pa & Lateral    Result Date: 11/15/2018  Narrative: CHEST INDICATION:   [id number]: Other long term (current) drug therapy  COMPARISON:  None EXAM PERFORMED/VIEWS:  XR CHEST PA & LATERAL Images: 3 FINDINGS:  Biventricular pacemaker seen Postsurgical changes from prior median sternotomy No acute consolidation congestion Osseous structures appear within normal limits for patient age  Impression: No acute consolidation or congestion Workstation performed: [id number]     Dxa Bone Density Spine Hip And Pelvis    Result Date: 11/29/2018  Narrative: CENTRAL  DXA SCAN CLINICAL HISTORY:   80year old  male risk factors include previous smoking  Rheumatoid arthritis  Osteoporosis screening  TECHNIQUE: Bone densitometry was performed using a UroSens bone densitometer  Regions of interest appear properly placed  There are no obvious fractures or other confounding variables which could limit the study  Degenerative changes of the lumbar spine and hip  This will falsely elevate the bone mineral densities in these regions  COMPARISON:  None  RESULTS: LUMBAR SPINE:  L1-L4: BMD 1 415 gm/cm2 T-score 1 4 Z-score 2 2 LEFT TOTAL HIP: BMD 0 730 gm/cm2 T-score -2 6 Z-score -1 3 LEFT FEMORAL NECK: BMD 0 731 gm/cm2 T-score -2 6 Z-score -0 9 TOTAL RIGHT HIP: BMD 0 741 gm/sq-cm, T-score is -2 5 Z-score is -1 2                  FEMORAL NECK RIGHT HIP : BMD 0 805 gm/sq-cm, T-score is -2 0 Z-score is -0 4     Impression: 1  Based on the [facility] classification, the T-score of -2 6 in the left hip is consistent with OSTEOPOROSIS   2   According to the [facility], prescription therapy is recommended with a T-score of -2 5 or less in the spine or hip after appropriate evaluation to exclude secondary causes  3   A daily intake of at least 1200 mg Calcium and 800 to 1000 IU of Vitamin D, as well as weight bearing and muscle strengthening exercise, fall prevention and avoidance of tobacco and excessive alcohol intake as  basic preventive measures are suggested  4   Repeat DXA  in 18 - 24 months, on the same machine, as clinically indicated  The 10 year risk of hip fracture is 11%, with the 10 year risk of major osteoporotic fracture being 20%, as calculated by the [facility] fracture risk assessment tool (FRAX)  The current NOF guidelines recommend treating patients with FRAX 10 year risk score of >3% for hip fracture and >20% for major osteoporotic fracture  WHO CLASSIFICATION: Normal (a T-score of -1 0 or higher) Low bone mineral density (a T-score of less than -1 0 but higher than -2 5) Osteoporosis (a T-score of -2 5 or less) Severe osteoporosis (a T-score of -2 5 or less with a fragility fracture)   Workstation performed: [id number]     EKG (independently reviewed):  Sinus rhythm at 65 beats per minute with nonspecific interventricular conduction delay    Discussion/Summary:  1  Dyspnea on exertion - improved  - Recent testing reviewed with him  Normal LV function and valve function seen  2  Coronary artery disease involving native coronary artery of native heart without angina pectoris  - ASA  - No ischemia or infarction present on stress test    3  Essential hypertension  - BP well controlled on current Rx     4  S/P AVR (aortic valve replacement)  - Normal gradients on echocardiogram    5  Paroxysmal atrial fibrillation  - on coumadin therapy - managed by his PCP  6  SSS s/p pacemaker  - will have routine follow-ups with pacemaker clinic

## 2019-09-18 ENCOUNTER — ANTICOAG VISIT (OUTPATIENT)
Dept: FAMILY MEDICINE CLINIC | Facility: CLINIC | Age: 84
End: 2019-09-18

## 2019-09-18 ENCOUNTER — APPOINTMENT (OUTPATIENT)
Dept: LAB | Facility: CLINIC | Age: 84
End: 2019-09-18
Payer: MEDICARE

## 2019-09-18 DIAGNOSIS — I48.0 PAROXYSMAL ATRIAL FIBRILLATION (HCC): ICD-10-CM

## 2019-09-18 LAB
INR PPP: 3.32 (ref 0.84–1.19)
PROTHROMBIN TIME: 33.2 SECONDS (ref 11.6–14.5)

## 2019-09-18 PROCEDURE — 85610 PROTHROMBIN TIME: CPT

## 2019-09-18 PROCEDURE — 36415 COLL VENOUS BLD VENIPUNCTURE: CPT

## 2019-09-19 ENCOUNTER — REMOTE DEVICE CLINIC VISIT (OUTPATIENT)
Dept: CARDIOLOGY CLINIC | Facility: CLINIC | Age: 84
End: 2019-09-19
Payer: MEDICARE

## 2019-09-19 ENCOUNTER — TELEPHONE (OUTPATIENT)
Dept: CARDIOLOGY CLINIC | Facility: CLINIC | Age: 84
End: 2019-09-19

## 2019-09-19 DIAGNOSIS — Z95.810 PRESENCE OF AUTOMATIC CARDIOVERTER/DEFIBRILLATOR (AICD): Primary | ICD-10-CM

## 2019-09-19 PROCEDURE — 93296 REM INTERROG EVL PM/IDS: CPT | Performed by: INTERNAL MEDICINE

## 2019-09-19 PROCEDURE — 93295 DEV INTERROG REMOTE 1/2/MLT: CPT | Performed by: INTERNAL MEDICINE

## 2019-09-19 NOTE — TELEPHONE ENCOUNTER
----- Message from Lyn Cervantes DO sent at 9/19/2019 12:52 PM EDT -----  Can you please let the patient know his remote monitor was normal

## 2019-09-19 NOTE — PROGRESS NOTES
Results for orders placed or performed in visit on 09/19/19   Cardiac EP device report    Narrative    MDT CRT-D  CARELINK TRANSMISSION: BATTERY VOLTAGE ADEQUATE  (4 6 YRS)  AP 15% BVP 97%  ALL AVAILABLE LEAD PARAMETERS WITHIN NORMAL LIMITS  1 NSVT EPISODES DETECTED  78 AT/AF EPISODES LONGEST 13 MIN  (0 5% OF TIME)  OPTI-VOL WITHIN NORMAL LIMITS  VENTRICULAR SENSE EPISODES NOTED  NORMAL DEVICE FUNCTION  ----DRAPER

## 2019-09-25 ENCOUNTER — ANTICOAG VISIT (OUTPATIENT)
Dept: FAMILY MEDICINE CLINIC | Facility: CLINIC | Age: 84
End: 2019-09-25

## 2019-09-25 ENCOUNTER — TELEPHONE (OUTPATIENT)
Dept: FAMILY MEDICINE CLINIC | Facility: CLINIC | Age: 84
End: 2019-09-25

## 2019-09-25 ENCOUNTER — APPOINTMENT (OUTPATIENT)
Dept: LAB | Facility: CLINIC | Age: 84
End: 2019-09-25
Payer: MEDICARE

## 2019-09-25 DIAGNOSIS — I48.0 PAROXYSMAL ATRIAL FIBRILLATION (HCC): ICD-10-CM

## 2019-09-25 LAB
INR PPP: 3.09 (ref 0.84–1.19)
PROTHROMBIN TIME: 31.3 SECONDS (ref 11.6–14.5)

## 2019-09-25 PROCEDURE — 36415 COLL VENOUS BLD VENIPUNCTURE: CPT

## 2019-09-25 PROCEDURE — 85610 PROTHROMBIN TIME: CPT

## 2019-09-27 ENCOUNTER — ANTICOAG VISIT (OUTPATIENT)
Dept: FAMILY MEDICINE CLINIC | Facility: CLINIC | Age: 84
End: 2019-09-27

## 2019-09-27 ENCOUNTER — APPOINTMENT (OUTPATIENT)
Dept: LAB | Facility: CLINIC | Age: 84
End: 2019-09-27
Payer: MEDICARE

## 2019-09-27 ENCOUNTER — TELEPHONE (OUTPATIENT)
Dept: FAMILY MEDICINE CLINIC | Facility: CLINIC | Age: 84
End: 2019-09-27

## 2019-09-27 DIAGNOSIS — I48.0 PAROXYSMAL ATRIAL FIBRILLATION (HCC): ICD-10-CM

## 2019-09-27 LAB
INR PPP: 2.95 (ref 0.84–1.19)
PROTHROMBIN TIME: 30.2 SECONDS (ref 11.6–14.5)

## 2019-09-27 PROCEDURE — 36415 COLL VENOUS BLD VENIPUNCTURE: CPT

## 2019-09-27 PROCEDURE — 85610 PROTHROMBIN TIME: CPT

## 2019-09-27 NOTE — TELEPHONE ENCOUNTER
----- Message from [address] sent at 9/27/2019  2:01 PM EDT -----  Continue same dose, recheck next Wednesday [facility]

## 2019-09-30 ENCOUNTER — OFFICE VISIT (OUTPATIENT)
Dept: FAMILY MEDICINE CLINIC | Facility: CLINIC | Age: 84
End: 2019-09-30
Payer: MEDICARE

## 2019-09-30 VITALS
SYSTOLIC BLOOD PRESSURE: 130 MMHG | DIASTOLIC BLOOD PRESSURE: 70 MMHG | RESPIRATION RATE: 16 BRPM | HEART RATE: 64 BPM | WEIGHT: 150 LBS | TEMPERATURE: 95.1 F | HEIGHT: 69 IN | BODY MASS INDEX: 22.22 KG/M2

## 2019-09-30 DIAGNOSIS — E78.5 DYSLIPIDEMIA, GOAL LDL BELOW 70: ICD-10-CM

## 2019-09-30 DIAGNOSIS — I10 ESSENTIAL HYPERTENSION: Primary | ICD-10-CM

## 2019-09-30 DIAGNOSIS — R73.01 IMPAIRED FASTING GLUCOSE: ICD-10-CM

## 2019-09-30 DIAGNOSIS — J30.2 SEASONAL ALLERGIC RHINITIS, UNSPECIFIED TRIGGER: ICD-10-CM

## 2019-09-30 DIAGNOSIS — M06.9 RHEUMATOID ARTHRITIS, INVOLVING UNSPECIFIED SITE, UNSPECIFIED RHEUMATOID FACTOR PRESENCE: ICD-10-CM

## 2019-09-30 DIAGNOSIS — I48.0 PAROXYSMAL ATRIAL FIBRILLATION (HCC): ICD-10-CM

## 2019-09-30 DIAGNOSIS — R05.9 COUGH: ICD-10-CM

## 2019-09-30 DIAGNOSIS — Z12.5 SCREENING PSA (PROSTATE SPECIFIC ANTIGEN): ICD-10-CM

## 2019-09-30 PROBLEM — G62.9 POLYNEUROPATHY: Status: ACTIVE | Noted: 2019-08-29

## 2019-09-30 PROBLEM — I35.0 AORTIC STENOSIS: Status: ACTIVE | Noted: 2019-09-30

## 2019-09-30 PROBLEM — J30.9 ALLERGIC RHINITIS: Status: ACTIVE | Noted: 2019-09-30

## 2019-09-30 PROCEDURE — 99214 OFFICE O/P EST MOD 30 MIN: CPT | Performed by: NURSE PRACTITIONER

## 2019-09-30 NOTE — PATIENT INSTRUCTIONS
Please have additional labs drawn Wednesday morning when you go to the lab  These will be FASTING   (Nothing to eat or drink for 12 hours prior, except for water)  Please check to see if you have Methrotrexate at home  Please contact Dr Maco Norman office if you need a refill on this  You are supposed to be taking this every week  We will call you to schedule a flu shot  You can also go the pharmacy for this  Use Flonase (Fluticasone) 2 sprays each nostril daily  Go for your chest Xray  If this is normal, I may send you for a CT Scan  I will call you with these results

## 2019-09-30 NOTE — PROGRESS NOTES
Assessment/Plan:    1  Essential hypertension  Assessment & Plan:  Stable on current medications    Orders:  -     Comprehensive metabolic panel; Future    2  Rheumatoid arthritis, involving unspecified site, unspecified rheumatoid factor presence ([address] )  Assessment & Plan:  He has not been taking Methotrexate  He will f/u with Dr Blaise Meckel for this      3  Cough  -     XR chest pa & lateral; Future; Expected date: 09/30/2019    4  Seasonal allergic rhinitis, unspecified trigger    5  Screening PSA (prostate specific antigen)  -     PSA, Total Screen; Future    6  Impaired fasting glucose  -     Hemoglobin A1C; Future    7  Paroxysmal atrial fibrillation [facility])  Assessment & Plan:  Sees cardiology routinely  8  Dyslipidemia, goal LDL below 70  Assessment & Plan:  Has not been taking a statin  Will check labs      Check labs as above  Reviewed medication list   Check CXR given chronic cough  Advised to use Flonase routinely  RTO 3 months or sooner prn          Patient Instructions   Please have additional labs drawn Wednesday morning when you go to the lab  These will be FASTING   (Nothing to eat or drink for 12 hours prior, except for water)  Please check to see if you have Methrotrexate at home  Please contact Dr Katina Wong office if you need a refill on this  You are supposed to be taking this every week  We will call you to schedule a flu shot  You can also go the pharmacy for this  Use Flonase (Fluticasone) 2 sprays each nostril daily  Go for your chest Xray  If this is normal, I may send you for a CT Scan  I will call you with these results  Return in about 3 months (around 12/30/2019) for Annual Wellness Visit  Subjective:      Patient ID: Esha Rosario is a 80 y o  male  Chief Complaint   Patient presents with    Follow-up     medication ac/cma     Blood Pressure Check       Here today for med check and 3 month follow up  He is due for routine labs     Reports runny nose and cough that is worse in the morning  His ears feel clogged and his head hurts with coughing  He has Flonase, but does not use this consistently  Also has complaints of bilateral knee pain with history of RA  He states he is not currently taking Methotrexate, as he ran out and thought he was not supposed to continue  Here today with daughter in law  Denies any difficulties with swallowing or choking  The following portions of the patient's history were reviewed and updated as appropriate: allergies, current medications, past family history, past medical history, past social history, past surgical history and problem list     Review of Systems   Constitutional: Negative for chills, fatigue and fever  HENT: Positive for ear pain (pressure) and rhinorrhea  Respiratory: Positive for cough  Negative for shortness of breath and wheezing  Cardiovascular: Negative for chest pain, palpitations and leg swelling  Gastrointestinal: Negative for abdominal pain, diarrhea, nausea and vomiting  Musculoskeletal:        See HPI   Skin: Negative for rash  Neurological: Negative for dizziness and headaches           Current Outpatient Medications   Medication Sig Dispense Refill    aspirin 81 MG tablet Take 81 mg by mouth every morning      folic acid (FOLVITE) 1 mg tablet Take 1 tablet (1 mg total) by mouth daily 30 tablet 11    gabapentin (NEURONTIN) 100 mg capsule Take 1 capsule (100 mg total) by mouth 2 (two) times a day 180 capsule 1    hydroxychloroquine (PLAQUENIL) 200 mg tablet Take 1 tablet (200 mg total) by mouth 2 (two) times a day with meals for 180 days 60 tablet 5    metoprolol succinate (TOPROL XL) 50 mg 24 hr tablet Take 1 tablet (50 mg total) by mouth 2 (two) times a day 180 tablet 1    warfarin (COUMADIN) 1 mg tablet 1/2 tab daily with other coumadin pill, and or as directed 90 tablet 1    warfarin (COUMADIN) 2 mg tablet 2 mg alternating with 3 mg daily or as directed 90 tablet 1    warfarin (COUMADIN) 3 mg tablet Take 1 tablet (3 mg total) by mouth daily OR AS DIRECTED 90 tablet 3    warfarin (COUMADIN) 4 mg tablet Take 1 tablet (4 mg total) by mouth daily 90 tablet 3    amLODIPine (NORVASC) 10 mg tablet Take 0 5 tablets (5 mg total) by mouth daily (Patient not taking: Reported on 9/30/2019) 45 tablet 1    calcium carbonate-vitamin D (OSCAL-D) 500 mg-200 units per tablet Take 1 tablet by mouth 2 (two) times a day with meals (Patient not taking: Reported on 9/30/2019) 60 tablet 11    fluticasone (FLONASE) 50 mcg/act nasal spray 2 sprays into each nostril daily (Patient not taking: Reported on 9/12/2019) 16 g 1    methotrexate (RHEUMATREX) 2 5 MG tablet Take 6 tablets (15 mg total) by mouth once a week (Patient not taking: Reported on 9/12/2019) 24 tablet 2    OS-ROD CALCIUM + D3 500-200 MG-UNIT TABS   1     No current facility-administered medications for this visit  Objective:    /70   Pulse 64   Temp (!) 95 1 °F (35 1 °C)   Resp 16   Ht 5' 9" (1 753 m)   Wt 68 kg (150 lb)   BMI 22 15 kg/m²        Physical Exam   Constitutional: He appears well-developed and well-nourished  HENT:   Head: Normocephalic and atraumatic  Right Ear: External ear and ear canal normal  Tympanic membrane is retracted  Left Ear: External ear and ear canal normal  Tympanic membrane is retracted  Nose: No mucosal edema (R>L) or rhinorrhea  Right sinus exhibits no maxillary sinus tenderness and no frontal sinus tenderness  Left sinus exhibits no maxillary sinus tenderness and no frontal sinus tenderness  Mouth/Throat: Uvula is midline, oropharynx is clear and moist and mucous membranes are normal    Eyes: Conjunctivae are normal    Neck: Neck supple  No edema present  No thyromegaly present  Cardiovascular: Normal rate, regular rhythm, normal heart sounds and intact distal pulses  No murmur heard    Pulmonary/Chest: Effort normal  He has wheezes (occasional expiratory) in the right upper field and the right lower field  Abdominal: Bowel sounds are normal  He exhibits no distension  There is no splenomegaly or hepatomegaly  There is no tenderness  Lymphadenopathy:        Right cervical: No superficial cervical adenopathy present  Left cervical: No superficial cervical adenopathy present  Skin: Skin is warm, dry and intact  No rash noted  Psychiatric: He has a normal mood and affect  Nursing note and vitals reviewed               Rosendo Haque

## 2019-10-02 ENCOUNTER — APPOINTMENT (OUTPATIENT)
Dept: LAB | Facility: CLINIC | Age: 84
End: 2019-10-02
Payer: MEDICARE

## 2019-10-02 ENCOUNTER — APPOINTMENT (OUTPATIENT)
Dept: RADIOLOGY | Facility: CLINIC | Age: 84
End: 2019-10-02
Payer: MEDICARE

## 2019-10-02 ENCOUNTER — ANTICOAG VISIT (OUTPATIENT)
Dept: FAMILY MEDICINE CLINIC | Facility: CLINIC | Age: 84
End: 2019-10-02

## 2019-10-02 DIAGNOSIS — R74.8 ELEVATED ALKALINE PHOSPHATASE LEVEL: ICD-10-CM

## 2019-10-02 DIAGNOSIS — I48.0 PAROXYSMAL ATRIAL FIBRILLATION (HCC): ICD-10-CM

## 2019-10-02 DIAGNOSIS — Z12.5 SCREENING PSA (PROSTATE SPECIFIC ANTIGEN): ICD-10-CM

## 2019-10-02 DIAGNOSIS — R05.9 COUGH: ICD-10-CM

## 2019-10-02 DIAGNOSIS — I10 ESSENTIAL HYPERTENSION: ICD-10-CM

## 2019-10-02 DIAGNOSIS — R73.01 IMPAIRED FASTING GLUCOSE: ICD-10-CM

## 2019-10-02 LAB
ALBUMIN SERPL BCP-MCNC: 2.8 G/DL (ref 3.5–5)
ALP SERPL-CCNC: 157 U/L (ref 46–116)
ALT SERPL W P-5'-P-CCNC: 14 U/L (ref 12–78)
ANION GAP SERPL CALCULATED.3IONS-SCNC: 6 MMOL/L (ref 4–13)
AST SERPL W P-5'-P-CCNC: 25 U/L (ref 5–45)
BILIRUB SERPL-MCNC: 0.71 MG/DL (ref 0.2–1)
BUN SERPL-MCNC: 13 MG/DL (ref 5–25)
CALCIUM SERPL-MCNC: 8.2 MG/DL (ref 8.3–10.1)
CHLORIDE SERPL-SCNC: 106 MMOL/L (ref 100–108)
CO2 SERPL-SCNC: 27 MMOL/L (ref 21–32)
CREAT SERPL-MCNC: 0.5 MG/DL (ref 0.6–1.3)
EST. AVERAGE GLUCOSE BLD GHB EST-MCNC: 111 MG/DL
GFR SERPL CREATININE-BSD FRML MDRD: 100 ML/MIN/1.73SQ M
GLUCOSE P FAST SERPL-MCNC: 87 MG/DL (ref 65–99)
HBA1C MFR BLD: 5.5 % (ref 4.2–6.3)
INR PPP: 2.71 (ref 0.84–1.19)
POTASSIUM SERPL-SCNC: 3.9 MMOL/L (ref 3.5–5.3)
PROT SERPL-MCNC: 7.2 G/DL (ref 6.4–8.2)
PROTHROMBIN TIME: 28.2 SECONDS (ref 11.6–14.5)
PSA SERPL-MCNC: 0.5 NG/ML (ref 0–4)
SODIUM SERPL-SCNC: 139 MMOL/L (ref 136–145)

## 2019-10-02 PROCEDURE — 82977 ASSAY OF GGT: CPT

## 2019-10-02 PROCEDURE — 80053 COMPREHEN METABOLIC PANEL: CPT

## 2019-10-02 PROCEDURE — 36415 COLL VENOUS BLD VENIPUNCTURE: CPT

## 2019-10-02 PROCEDURE — 83036 HEMOGLOBIN GLYCOSYLATED A1C: CPT

## 2019-10-02 PROCEDURE — G0103 PSA SCREENING: HCPCS

## 2019-10-02 PROCEDURE — 85610 PROTHROMBIN TIME: CPT

## 2019-10-02 PROCEDURE — 71046 X-RAY EXAM CHEST 2 VIEWS: CPT

## 2019-10-03 ENCOUNTER — TELEPHONE (OUTPATIENT)
Dept: FAMILY MEDICINE CLINIC | Facility: CLINIC | Age: 84
End: 2019-10-03

## 2019-10-03 DIAGNOSIS — R74.8 ELEVATED ALKALINE PHOSPHATASE LEVEL: Primary | ICD-10-CM

## 2019-10-03 LAB — GGT SERPL-CCNC: 49 U/L (ref 5–85)

## 2019-10-03 NOTE — TELEPHONE ENCOUNTER
I added on a Gamma GT level patient's labs  Can someone please call [facility] and have them add it on? Thanks!

## 2019-10-09 ENCOUNTER — TELEPHONE (OUTPATIENT)
Dept: FAMILY MEDICINE CLINIC | Facility: CLINIC | Age: 84
End: 2019-10-09

## 2019-10-09 ENCOUNTER — ANTICOAG VISIT (OUTPATIENT)
Dept: FAMILY MEDICINE CLINIC | Facility: CLINIC | Age: 84
End: 2019-10-09

## 2019-10-09 ENCOUNTER — APPOINTMENT (OUTPATIENT)
Dept: LAB | Facility: CLINIC | Age: 84
End: 2019-10-09
Payer: MEDICARE

## 2019-10-09 DIAGNOSIS — I48.0 PAROXYSMAL ATRIAL FIBRILLATION (HCC): ICD-10-CM

## 2019-10-09 LAB
INR PPP: 2.04 (ref 0.84–1.19)
PROTHROMBIN TIME: 22.5 SECONDS (ref 11.6–14.5)

## 2019-10-09 PROCEDURE — 85610 PROTHROMBIN TIME: CPT

## 2019-10-09 PROCEDURE — 36415 COLL VENOUS BLD VENIPUNCTURE: CPT

## 2019-10-09 NOTE — TELEPHONE ENCOUNTER
Pt dropped off forms for me to fill out for [facility]  He will need to schedule a nurse visit for a flu shot and PPD  Please have him schedule and I can return his forms to him when he has his PPD read   Cullen Jha

## 2019-10-11 NOTE — TELEPHONE ENCOUNTER
Spoke with pt, stated he will call back to make PPD appointment or schedule th appointment after his flu clinic visit      Beatriz Anne MA

## 2019-10-15 ENCOUNTER — CLINICAL SUPPORT (OUTPATIENT)
Dept: FAMILY MEDICINE CLINIC | Facility: CLINIC | Age: 84
End: 2019-10-15
Payer: MEDICARE

## 2019-10-15 DIAGNOSIS — Z23 NEED FOR VACCINATION: Primary | ICD-10-CM

## 2019-10-15 PROCEDURE — G0008 ADMIN INFLUENZA VIRUS VAC: HCPCS

## 2019-10-15 PROCEDURE — 90662 IIV NO PRSV INCREASED AG IM: CPT

## 2019-10-15 PROCEDURE — 86580 TB INTRADERMAL TEST: CPT

## 2019-10-16 ENCOUNTER — ANTICOAG VISIT (OUTPATIENT)
Dept: FAMILY MEDICINE CLINIC | Facility: CLINIC | Age: 84
End: 2019-10-16

## 2019-10-16 ENCOUNTER — APPOINTMENT (OUTPATIENT)
Dept: LAB | Facility: CLINIC | Age: 84
End: 2019-10-16
Payer: MEDICARE

## 2019-10-16 DIAGNOSIS — I48.0 PAROXYSMAL ATRIAL FIBRILLATION (HCC): ICD-10-CM

## 2019-10-16 LAB
INR PPP: 2.07 (ref 0.84–1.19)
INR PPP: 2.07 (ref 0.84–1.19)
PROTHROMBIN TIME: 22.8 SECONDS (ref 11.6–14.5)

## 2019-10-16 PROCEDURE — 36415 COLL VENOUS BLD VENIPUNCTURE: CPT

## 2019-10-16 PROCEDURE — 85610 PROTHROMBIN TIME: CPT

## 2019-10-17 ENCOUNTER — CLINICAL SUPPORT (OUTPATIENT)
Dept: FAMILY MEDICINE CLINIC | Facility: CLINIC | Age: 84
End: 2019-10-17

## 2019-10-17 DIAGNOSIS — Z11.1 ENCOUNTER FOR PPD SKIN TEST READING: Primary | ICD-10-CM

## 2019-10-17 LAB
INDURATION: NORMAL MM
TB SKIN TEST: NEGATIVE

## 2019-10-18 ENCOUNTER — REMOTE DEVICE CLINIC VISIT (OUTPATIENT)
Dept: CARDIOLOGY CLINIC | Facility: CLINIC | Age: 84
End: 2019-10-18
Payer: MEDICARE

## 2019-10-18 DIAGNOSIS — Z95.810 PRESENCE OF AUTOMATIC CARDIOVERTER/DEFIBRILLATOR (AICD): Primary | ICD-10-CM

## 2019-10-18 PROCEDURE — 93297 REM INTERROG DEV EVAL ICPMS: CPT | Performed by: INTERNAL MEDICINE

## 2019-10-18 PROCEDURE — 93299 PR REM INTERROG ICPMS/SCRMS <30 D TECH REVIEW: CPT | Performed by: INTERNAL MEDICINE

## 2019-10-18 NOTE — PROGRESS NOTES
Results for orders placed or performed in visit on 10/18/19   Cardiac EP device report    Narrative    MDT CRT-D  CARELINK TRANSMISSION - OPTI-VOL ONLY: OPTI-VOL FLUID THRESHOLD CROSSED  RECHECK IN ONE MONTH  AP 20% BVP 98%  ALL AVAILABLE LEAD PARAMETERS WITHIN NORMAL LIMITS  NO SIGNIFICANT HIGH RATE EPISODES  NORMAL DEVICE FUNCTION  ---DRAPER

## 2019-10-21 ENCOUNTER — TELEPHONE (OUTPATIENT)
Dept: CARDIOLOGY CLINIC | Facility: CLINIC | Age: 84
End: 2019-10-21

## 2019-10-21 NOTE — TELEPHONE ENCOUNTER
----- Message from Monster Swanson DO sent at 10/21/2019  1:39 PM EDT -----  Can you please call patient and asked if he is short of breath at all?

## 2019-10-23 ENCOUNTER — APPOINTMENT (OUTPATIENT)
Dept: LAB | Facility: CLINIC | Age: 84
End: 2019-10-23
Payer: MEDICARE

## 2019-10-23 DIAGNOSIS — I48.0 PAROXYSMAL ATRIAL FIBRILLATION (HCC): ICD-10-CM

## 2019-10-23 LAB
INR PPP: 1.77 (ref 0.84–1.19)
PROTHROMBIN TIME: 20.1 SECONDS (ref 11.6–14.5)

## 2019-10-23 PROCEDURE — 85610 PROTHROMBIN TIME: CPT

## 2019-10-23 PROCEDURE — 36415 COLL VENOUS BLD VENIPUNCTURE: CPT

## 2019-10-24 ENCOUNTER — ANTICOAG VISIT (OUTPATIENT)
Dept: FAMILY MEDICINE CLINIC | Facility: CLINIC | Age: 84
End: 2019-10-24

## 2019-10-24 ENCOUNTER — TELEPHONE (OUTPATIENT)
Dept: FAMILY MEDICINE CLINIC | Facility: CLINIC | Age: 84
End: 2019-10-24

## 2019-10-24 DIAGNOSIS — I48.0 PAROXYSMAL ATRIAL FIBRILLATION (HCC): ICD-10-CM

## 2019-10-24 NOTE — TELEPHONE ENCOUNTER
Called pt, wasn't at home, would like us to call him in 1 hour    Change dose to 3 5 mg from 3 mg and redraw in 2 days  Marcia Melissa

## 2019-10-24 NOTE — TELEPHONE ENCOUNTER
----- Message from Gaby Dunn sent at 10/24/2019 10:20 AM EDT -----      ----- Message -----  From: Samanta Mueller DO  Sent: 10/23/2019   4:05 PM EDT  To: GUSTAVO Randall    Change dose to 3 5 mg from 3 mg and redraw in 2 days

## 2019-10-28 ENCOUNTER — APPOINTMENT (OUTPATIENT)
Dept: LAB | Facility: CLINIC | Age: 84
End: 2019-10-28
Payer: MEDICARE

## 2019-10-28 ENCOUNTER — ANTICOAG VISIT (OUTPATIENT)
Dept: FAMILY MEDICINE CLINIC | Facility: CLINIC | Age: 84
End: 2019-10-28

## 2019-10-28 DIAGNOSIS — I48.0 PAROXYSMAL ATRIAL FIBRILLATION (HCC): ICD-10-CM

## 2019-10-28 LAB
INR PPP: 1.98 (ref 0.84–1.19)
PROTHROMBIN TIME: 22 SECONDS (ref 11.6–14.5)

## 2019-10-28 PROCEDURE — 36415 COLL VENOUS BLD VENIPUNCTURE: CPT

## 2019-10-28 PROCEDURE — 85610 PROTHROMBIN TIME: CPT

## 2019-11-01 ENCOUNTER — APPOINTMENT (OUTPATIENT)
Dept: LAB | Facility: CLINIC | Age: 84
End: 2019-11-01
Payer: MEDICARE

## 2019-11-01 ENCOUNTER — ANTICOAG VISIT (OUTPATIENT)
Dept: FAMILY MEDICINE CLINIC | Facility: CLINIC | Age: 84
End: 2019-11-01

## 2019-11-01 DIAGNOSIS — I48.0 PAROXYSMAL ATRIAL FIBRILLATION (HCC): ICD-10-CM

## 2019-11-01 PROCEDURE — 36415 COLL VENOUS BLD VENIPUNCTURE: CPT

## 2019-11-01 PROCEDURE — 85610 PROTHROMBIN TIME: CPT

## 2019-11-06 ENCOUNTER — APPOINTMENT (OUTPATIENT)
Dept: LAB | Facility: CLINIC | Age: 84
End: 2019-11-06
Payer: MEDICARE

## 2019-11-06 ENCOUNTER — TELEPHONE (OUTPATIENT)
Dept: FAMILY MEDICINE CLINIC | Facility: CLINIC | Age: 84
End: 2019-11-06

## 2019-11-06 DIAGNOSIS — I48.0 PAROXYSMAL ATRIAL FIBRILLATION (HCC): ICD-10-CM

## 2019-11-06 LAB
INR PPP: 1.88 (ref 0.84–1.19)
PROTHROMBIN TIME: 21.1 SECONDS (ref 11.6–14.5)

## 2019-11-06 PROCEDURE — 36415 COLL VENOUS BLD VENIPUNCTURE: CPT

## 2019-11-06 PROCEDURE — 85610 PROTHROMBIN TIME: CPT

## 2019-11-06 NOTE — TELEPHONE ENCOUNTER
----- Message from [facility] sent at 11/6/2019  2:11 PM EST -----  4mg Tu, Thurs, Sat, 3 mg all other days  Recheck in 1 week   [facility]

## 2019-11-07 ENCOUNTER — ANTICOAG VISIT (OUTPATIENT)
Dept: FAMILY MEDICINE CLINIC | Facility: CLINIC | Age: 84
End: 2019-11-07

## 2019-11-07 DIAGNOSIS — I48.0 PAROXYSMAL ATRIAL FIBRILLATION (HCC): ICD-10-CM

## 2019-11-07 NOTE — TELEPHONE ENCOUNTER
Jyoti Hanson he wants me to call him back at his home number within 1 to 2 hrs [phone]      Guero Partida MA

## 2019-11-07 NOTE — TELEPHONE ENCOUNTER
Calling pt, no answer on his cell phone number, he updated his house number, but seems to have given [facility] number instead       Bhvaesh Terrazas MA

## 2019-11-08 ENCOUNTER — OFFICE VISIT (OUTPATIENT)
Dept: RHEUMATOLOGY | Facility: CLINIC | Age: 84
End: 2019-11-08
Payer: MEDICARE

## 2019-11-08 VITALS
SYSTOLIC BLOOD PRESSURE: 154 MMHG | HEART RATE: 64 BPM | HEIGHT: 69 IN | DIASTOLIC BLOOD PRESSURE: 80 MMHG | BODY MASS INDEX: 21.33 KG/M2 | WEIGHT: 144 LBS

## 2019-11-08 DIAGNOSIS — M70.22 OLECRANON BURSITIS OF LEFT ELBOW: ICD-10-CM

## 2019-11-08 DIAGNOSIS — M15.0 PRIMARY GENERALIZED (OSTEO)ARTHRITIS: ICD-10-CM

## 2019-11-08 DIAGNOSIS — Z79.899 LONG-TERM USE OF PLAQUENIL: ICD-10-CM

## 2019-11-08 DIAGNOSIS — Z79.899 HIGH RISK MEDICATION USE: ICD-10-CM

## 2019-11-08 DIAGNOSIS — M81.0 AGE-RELATED OSTEOPOROSIS WITHOUT CURRENT PATHOLOGICAL FRACTURE: ICD-10-CM

## 2019-11-08 DIAGNOSIS — M05.9 SEROPOSITIVE RHEUMATOID ARTHRITIS (HCC): Primary | ICD-10-CM

## 2019-11-08 PROCEDURE — 99214 OFFICE O/P EST MOD 30 MIN: CPT | Performed by: INTERNAL MEDICINE

## 2019-11-08 RX ORDER — HYDROXYCHLOROQUINE SULFATE 200 MG/1
200 TABLET, FILM COATED ORAL 2 TIMES DAILY WITH MEALS
Qty: 60 TABLET | Refills: 5 | Status: SHIPPED | OUTPATIENT
Start: 2019-11-08 | End: 2019-12-30 | Stop reason: SDUPTHER

## 2019-11-08 NOTE — PROGRESS NOTES
Assessment and Plan:   Mr Micah Stringer an 51-year-old  male with history significant for seropositive rheumatoid arthritis (positive rheumatoid factor and CCP antibody), generalized osteoarthritis and osteoporosis who presents for follow-up  He is currently on hydroxychloroquine 200 mg twice daily      # Seropositive rheumatoid arthritis, currently with severe activity, with evidence of chronic synovial hypertrophy and joint deformities  -Dickson Quintero is seen today in follow-up for the seropositive rheumatoid arthritis which is currently managed with hydroxychloroquine 200 mg twice daily  We had started oral methotrexate at 15 mg weekly at the last office visit in August, but unfortunately patient accidentally discontinued the medication after a period of 6 weeks  He has continued to experience arthralgias, with significant evidence of synovitis at multiple joints, and in view of this I do not think that the hydroxychloroquine is sufficient in controlling the rheumatoid arthritis  In view of this I recommend we restart the methotrexate at 15 mg weekly and continue folic acid 1 mg daily  I requested he repeat a CBC and CMP at this time as well  - I requested he also follow up for annual eye exams for the hydroxychloroquine monitoring      # Osteoporosis  - He will be due for the next Prolia injection at his follow-up visit in 3 months    - I advised him to continue the calcium and vitamin-D supplements daily      # Left knee osteoarthritis  - He is most symptomatic at his left knee, which appears to be secondary to advanced degenerative arthritis   There is a large left knee effusion present, and I offered an arthrocentesis as well as intra-articular cortisone injection for symptomatic relief but he declines at this time   I advised him to call the office if he is interested in pursuing this option  Luis Carlos Galo would like to hold off on a referral to Orthopedics as well for consideration of a total knee replacement surgery  Plan:  Diagnoses and all orders for this visit:    Seropositive rheumatoid arthritis ([address] )  -     methotrexate (RHEUMATREX) 2 5 MG tablet; Take 6 tablets (15 mg total) by mouth once a week  -     hydroxychloroquine (PLAQUENIL) 200 mg tablet; Take 1 tablet (200 mg total) by mouth 2 (two) times a day with meals    Primary generalized (osteo)arthritis    Long-term use of Plaquenil    Age-related osteoporosis without current pathological fracture    High risk medication use  -     CBC and differential; Future  -     Comprehensive metabolic panel; Future    Olecranon bursitis of left elbow    Other orders  -     Cancel: CBC and differential; Future  -     Cancel: Comprehensive metabolic panel; Future      Activities as tolerated    Diet: low carb/low fat, more greens/vegetables, adequate hydration  Exercise: try to maintain a low impact exercise regimen as much as possible  Walk for 30 minutes a day for at least 3 days a week    Encouraged to maintain good sleep hygiene  Continue other medications as prescribed by PCP and other specialists        RTC in 3 months  HPI    INITIAL VISIT NOTE:  Mr Taylor Esteban is an 66-year-old male with history significant for seropositive rheumatoid arthritis (positive RF and CCP) who presents for initial evaluation and establishing with Rheumatology       Patient and his wife are present at today's visit and although they are both very coherent, there appears to be a slight lapse in recalling patients prior history  History today is obtained from patient, his wife, and also from review of his prior rheumatology records      He was diagnosed with seropositive rheumatoid arthritis about 15 years ago when he developed diffuse joint pains   He was first seen by Dr Christen Meneses (private practice rheumatologist) and was started on Hydroxychloroquine 400 mg daily along with low dose steroids, and it appears he was maintained on that regimen till about 2016, when he discontinued the medications as his symptoms had resolved  He remained mostly asymptomatic until earlier this year when he again experienced diffuse joint pains and swelling, but predominantly in his hands, wrists, shoulders and knees  Per the notes from his most recent rheumatologist, Dr Joanna Headley from [facility], patient was restarted on Plaquenil and low dose prednisone, but patient is unaware of this and says he was not taking the medications  There was also conversation regarding step-up therapy to TNF-inhibitors, but unfortunately due to the cost Humira or Enbrel were never started  Overall, as per patient he is currently not on any medications for the rheumatoid arthritis      Currently, he states continued aching pain in his joints, mostly affecting his bilateral shoulders, left wrist, bilateral hands and left knee  He has swelling of his fingers, left wrist and knee as well  Morning stiffness lasts a few hours and slightly eases up but does not resolve completely  He denies fevers, sicca symptoms, mouth/nose ulcers, swollen glands, skin rash, abdominal pain, N/V/D or blood in stools  He does not get recurrent infections   A quantiferon checked in 7/2018 was negative          11/13/2018:  Patient is seen for follow-up today in view of seropositive rheumatoid arthritis with chronic deformities   He is currently on methotrexate 4 tablets weekly with folic acid 1 mg daily, hydroxychloroquine 200 mg b i d  and prednisone 5 mg daily      This combination of medications was started following his initial visit with me on 09/11, and he states there has been a significant improvement in his overall joint pains since starting this regimen   He noticed the improvement in the first few days, and states he only experiences occasional pain in his left hand 3rd and 4th digits, but otherwise denies any joint pains at this time   A few days ago he did have a twisting injury of his left knee which resulted in an occasional pain, but that is also improving   He does have chronic numbness down his left lower extremity   He denies any acutely swollen joints and states morning stiffness has also improved   He is otherwise tolerating the medications well without any noticeable side effects such as fevers, chills, recurrent infections, mouth or nose ulcers or GI upset   Of note he does give a history of a chronic dry cough for the past 1 year which is associated with a runny nose, and this has previously been attributed to allergies   He has not had any recent chest x-rays done  [place] has chronic shortness of breath on exertion which is unchanged   No chest pain   No other complaints at this time         12/11/2018:  Patient presents for follow-up today to discuss results of his bone density exam      He had a recent DEXA scan done on the 29th of November which showed osteoporosis and a T-score of -2 6 in the left hip  Ross Hutton is a 10 year risk of hip fracture being 11%, with a 10 year risk of major osteoporotic fracture being 20%   Patient denies any history of recent obvious fractures   He is willing to start medications for the osteoporosis  [place] is currently not taking calcium or vitamin-D supplements      In terms of the rheumatoid arthritis he has been doing well on a combination of methotrexate 4 tablets weekly, with hydroxychloroquine 200 mg b i d  and prednisone 5 mg daily   He denies any significant joint pains or swelling at today's visit  Judyth Dance did also review his labs following the prior visit which revealed improvement of his inflammatory markers, as well as an unremarkable CBC and CMP       He does report a head injury he sustained earlier today, and has a bruise at that site  [place] denies any headaches, dizziness or changes in his vision   No other acute complaints at today's visit         3/12/2019:  Patient presents for follow-up of seropositive rheumatoid arthritis  [place] is currently on hydroxychloroquine 200 mg twice daily  [place] states since the prior office visit he has discontinued the methotrexate and prednisone, but cannot recall when this was done   We had not discussed discontinuing these medications      He states overall his joint pains have remained stable, but unfortunately 1 and half weeks ago he accidentally temporarily discontinued the Plaquenil and did notice a slight flare-up in joint pains   He has since restarted the hydroxychloroquine and except for ongoing pain in his left knee, denies any significant flare-up in joint pains   He has not noticed any swollen joints   He does continue to experience morning stiffness which lasts less than 1 hour, but does report gelling phenomenon   In view of the left knee pain he was seen by Orthopedics (Dr Rich Early offered intra-articular cortisone injections or Euflexxa, but patient would like to hold off for now  Liborio Reyes may also consider a knee replacement in the future, but would like to hold off on seeing Orthopedic surgery for now      No other complaints noted at this time         5/7/2019:  Patient presents for follow-up of seropositive rheumatoid arthritis  Liborio Reyes is currently not on any medications   Unfortunately his wife did pass away a few days ago, and he discontinued all of his medications at that time, excluding the Coumadin  Liborio Reyes states he has been coping well and does have the support of family nearby  Liborio Reyes has had a loss of appetite and has lost a significant amount of weight      In terms of the joint pains, he has noticed a recurrence of pain affecting his hands, and has chronic pain affecting his left knee   He is not interested in pursuing an Orthopedics referral for consideration of a total knee replacement at this time  Liborio Reyes does have occasional pain affecting his elbows and shoulders as well  Liborio Reyes has noticed swelling of the left knee   He experiences morning stiffness which affects him diffusely and lasts about 30 minutes   He is not taking any over-the-counter pain medications   No other complaints noted at this time          8/8/2019:  Patient presents for follow-up of seropositive rheumatoid arthritis  He is currently on hydroxychloroquine 200 mg twice daily      There has been no significant change in his joint pains noted with the hydroxychloroquine, although he admits to intermittently being noncompliant and forgetful in taking his medications  He continues to experience pain most prominently at his right shoulder and left knee  He states his hands and wrists do not really bother him in terms of pain, but he does have difficulty with gripping items and with strength  He has noticed swelling affecting his hands and left knee  He experiences morning stiffness which affects him diffusely and lasts a few minutes  He would like to continue holding off on seeing Orthopedics for the left knee osteoarthritis      He reports overall he has been doing well after the passing of his wife, and has been busy with his family and friends      Of note, he is due for his 2nd Prolia injection today, but unfortunately as we do not have any in stock he will reschedule an appointment for this  He tolerated the 1st injection well without any concerns for side effects  11/8/2019:  Patient presents for a follow-up of seropositive rheumatoid arthritis  He is currently on hydroxychloroquine 200 mg twice daily  He mentions he took the methotrexate at 6 tablets weekly for a total of 6 weeks, but then discontinued it as he thought he was out of refills  He mentions currently he only experiences pain in his hands when he makes a tight fist   He will also occasionally experience pain in his wrists, left elbow, left shoulder and bilateral knees  He has noticed swelling affecting his hands and a golf ball sized swelling at his left elbow  He does experience morning stiffness which affects him diffusely but improves with activities      The following portions of the patient's history were reviewed and updated as appropriate: allergies, current medications, past family history, past medical history, past social history, past surgical history and problem list       Review of Systems  Constitutional: Negative for weight change, fevers, chills, night sweats, fatigue  ENT/Mouth: Negative for hearing changes, ear pain, nasal congestion, sinus pain, hoarseness, sore throat, rhinorrhea, swallowing difficulty  Eyes: Negative for pain, redness, discharge  Positive for vision changes  Cardiovascular: Negative for chest pain, SOB, palpitations  Respiratory: Negative for sputum, wheezing, dyspnea  Positive for cough  Gastrointestinal: Negative for nausea, vomiting, diarrhea, constipation, pain, heartburn  Genitourinary: Negative for dysuria, urinary frequency, hematuria  Musculoskeletal: As per HPI  Skin: Negative for skin rash, color changes  Neuro: Negative for weakness, numbness, tingling, loss of consciousness  Psych: Negative for anxiety, depression  Heme/Lymph: Negative for easy bruising, bleeding, lymphadenopathy          Past Medical History:   Diagnosis Date    CAD (coronary artery disease)     Cardiomyopathy ([facility] )     Dupuytren contracture     Dyslipidemia     Essential hypertension     ICD (implantable cardioverter-defibrillator) in place     Lumbosacral radiculopathy at S1     Osteoarthritis     Rheumatoid arthritis (HCC)     SNHL (sensorineural hearing loss)     Spinal stenosis     Type 2 diabetes mellitus ([facility] )        Past Surgical History:   Procedure Laterality Date    AORTIC VALVE REPLACEMENT      CHOLECYSTECTOMY         Social History     Socioeconomic History    Marital status: /Civil Union     Spouse name: Not on file    Number of children: Not on file    Years of education: Not on file    Highest education level: Not on file   Occupational History    Not on file   Social Needs    Financial resource strain: Not on file    Food insecurity:     Worry: Not on file Inability: Not on file    Transportation needs:     Medical: Not on file     Non-medical: Not on file   Tobacco Use    Smoking status: Former Smoker     Last attempt to quit:      Years since quittin 8    Smokeless tobacco: Never Used   Substance and Sexual Activity    Alcohol use: No    Drug use: No    Sexual activity: Not on file   Lifestyle    Physical activity:     Days per week: Not on file     Minutes per session: Not on file    Stress: Not on file   Relationships    Social connections:     Talks on phone: Not on file     Gets together: Not on file     Attends Jewish service: Not on file     Active member of club or organization: Not on file     Attends meetings of clubs or organizations: Not on file     Relationship status: Not on file    Intimate partner violence:     Fear of current or ex partner: Not on file     Emotionally abused: Not on file     Physically abused: Not on file     Forced sexual activity: Not on file   Other Topics Concern    Not on file   Social History Narrative    Not on file       Family History   Problem Relation Age of Onset    Heart disease Brother     Cancer Brother     COPD Father     Diabetes Mother        No Known Allergies      Current Outpatient Medications:     amLODIPine (NORVASC) 10 mg tablet, Take 0 5 tablets (5 mg total) by mouth daily (Patient not taking: Reported on 2019), Disp: 45 tablet, Rfl: 1    aspirin 81 MG tablet, Take 81 mg by mouth every morning, Disp: , Rfl:     calcium carbonate-vitamin D (OSCAL-D) 500 mg-200 units per tablet, Take 1 tablet by mouth 2 (two) times a day with meals (Patient not taking: Reported on 2019), Disp: 60 tablet, Rfl: 11    fluticasone (FLONASE) 50 mcg/act nasal spray, 2 sprays into each nostril daily (Patient not taking: Reported on 2019), Disp: 16 g, Rfl: 1    folic acid (FOLVITE) 1 mg tablet, Take 1 tablet (1 mg total) by mouth daily, Disp: 30 tablet, Rfl: 11    gabapentin (NEURONTIN) 100 mg capsule, Take 1 capsule (100 mg total) by mouth 2 (two) times a day, Disp: 180 capsule, Rfl: 1    hydroxychloroquine (PLAQUENIL) 200 mg tablet, Take 1 tablet (200 mg total) by mouth 2 (two) times a day with meals, Disp: 60 tablet, Rfl: 5    methotrexate (RHEUMATREX) 2 5 MG tablet, Take 6 tablets (15 mg total) by mouth once a week, Disp: 24 tablet, Rfl: 2    methotrexate 2 5 mg tablet, , Disp: , Rfl: 0    metoprolol succinate (TOPROL XL) 50 mg 24 hr tablet, Take 1 tablet (50 mg total) by mouth 2 (two) times a day, Disp: 180 tablet, Rfl: 1    OS-ROD CALCIUM + D3 500-200 MG-UNIT TABS, , Disp: , Rfl: 1    warfarin (COUMADIN) 1 mg tablet, 1/2 tab daily with other coumadin pill, and or as directed, Disp: 90 tablet, Rfl: 1    warfarin (COUMADIN) 2 mg tablet, 2 mg alternating with 3 mg daily or as directed, Disp: 90 tablet, Rfl: 1    warfarin (COUMADIN) 3 mg tablet, Take 1 tablet (3 mg total) by mouth daily OR AS DIRECTED, Disp: 90 tablet, Rfl: 3    warfarin (COUMADIN) 4 mg tablet, Take 1 tablet (4 mg total) by mouth daily, Disp: 90 tablet, Rfl: 3      Objective:    Vitals:    11/08/19 0950   BP: 154/80   Pulse: 64   Weight: 65 3 kg (144 lb)   Height: 5' 9" (1 753 m)       Physical Exam  General: Well appearing, well nourished, in no distress  Oriented x 3, normal mood and affect  Ambulating with aid of a cane  Skin: Good turgor, no rash, unusual bruising or prominent lesions  Nails: Normal color, no deformities  HEENT:  Head: Normocephalic, atraumatic  Eyes: Conjunctiva clear, sclera non-icteric, EOM intact  Nose: No external lesions, mucosa non-inflamed  Mouth: Mucous membranes moist, no mucosal lesions  Extremities: No amputations or deformities, cyanosis, edema  Musculoskeletal:   Hands - he has significant soft tissue swelling present at his left hand 4th PIP joint, and at his right hand 5th MCP joint    He also has additional soft tissue swelling present throughout his bilateral PIP joints, and to a lesser degree of his left hand MCPs  He is tender to palpation of various PIP and MCP joints bilaterally  There are also osteoarthritic changes present at his DIP joints bilaterally  Right 5th digit has a fixed boutonniere deformity  There is slight ulnar deviation noted of his right hand  Wrists - there is no significant soft tissue swelling or tenderness noted  Elbows - chronic synovial hypertrophy noted more significantly at his right elbow  He has limitation in full extension of his right elbow  There is no tenderness noted  He has left elbow bursal swelling present which is soft, nontender and without warmth or erythema  Shoulders - nontender without any soft tissue swelling, but he has limited active abduction till 90° of his right shoulder  Knees - there is a large left knee effusion present without warmth, erythema or significant tenderness  He has crepitus present bilaterally  There is also bony enlargement present bilaterally  Ankles and feet - nontender with negative MTP squeeze test bilaterally  Neurologic: Alert and oriented  No focal neurological deficits appreciated  Psychiatric: Normal mood and affect  NAVYA Kim    Rheumatology

## 2019-11-13 ENCOUNTER — ANTICOAG VISIT (OUTPATIENT)
Dept: FAMILY MEDICINE CLINIC | Facility: CLINIC | Age: 84
End: 2019-11-13

## 2019-11-13 ENCOUNTER — APPOINTMENT (OUTPATIENT)
Dept: LAB | Facility: CLINIC | Age: 84
End: 2019-11-13
Payer: MEDICARE

## 2019-11-13 ENCOUNTER — TELEPHONE (OUTPATIENT)
Dept: FAMILY MEDICINE CLINIC | Facility: CLINIC | Age: 84
End: 2019-11-13

## 2019-11-13 DIAGNOSIS — I48.0 PAROXYSMAL ATRIAL FIBRILLATION (HCC): ICD-10-CM

## 2019-11-13 DIAGNOSIS — Z79.899 HIGH RISK MEDICATION USE: ICD-10-CM

## 2019-11-13 LAB
ALBUMIN SERPL BCP-MCNC: 2.8 G/DL (ref 3.5–5)
ALP SERPL-CCNC: 133 U/L (ref 46–116)
ALT SERPL W P-5'-P-CCNC: 12 U/L (ref 12–78)
ANION GAP SERPL CALCULATED.3IONS-SCNC: 5 MMOL/L (ref 4–13)
AST SERPL W P-5'-P-CCNC: 20 U/L (ref 5–45)
BASOPHILS # BLD AUTO: 0.12 THOUSANDS/ΜL (ref 0–0.1)
BASOPHILS NFR BLD AUTO: 1 % (ref 0–1)
BILIRUB SERPL-MCNC: 1.11 MG/DL (ref 0.2–1)
BUN SERPL-MCNC: 12 MG/DL (ref 5–25)
CALCIUM SERPL-MCNC: 8.5 MG/DL (ref 8.3–10.1)
CHLORIDE SERPL-SCNC: 105 MMOL/L (ref 100–108)
CO2 SERPL-SCNC: 28 MMOL/L (ref 21–32)
CREAT SERPL-MCNC: 0.64 MG/DL (ref 0.6–1.3)
EOSINOPHIL # BLD AUTO: 0.14 THOUSAND/ΜL (ref 0–0.61)
EOSINOPHIL NFR BLD AUTO: 2 % (ref 0–6)
ERYTHROCYTE [DISTWIDTH] IN BLOOD BY AUTOMATED COUNT: 17.9 % (ref 11.6–15.1)
GFR SERPL CREATININE-BSD FRML MDRD: 90 ML/MIN/1.73SQ M
GLUCOSE SERPL-MCNC: 94 MG/DL (ref 65–140)
HCT VFR BLD AUTO: 34.7 % (ref 36.5–49.3)
HGB BLD-MCNC: 10.5 G/DL (ref 12–17)
IMM GRANULOCYTES # BLD AUTO: 0.02 THOUSAND/UL (ref 0–0.2)
IMM GRANULOCYTES NFR BLD AUTO: 0 % (ref 0–2)
INR PPP: 1.78 (ref 0.84–1.19)
INR PPP: 1.78 (ref 0.84–1.19)
LYMPHOCYTES # BLD AUTO: 1.43 THOUSANDS/ΜL (ref 0.6–4.47)
LYMPHOCYTES NFR BLD AUTO: 15 % (ref 14–44)
MCH RBC QN AUTO: 23.8 PG (ref 26.8–34.3)
MCHC RBC AUTO-ENTMCNC: 30.3 G/DL (ref 31.4–37.4)
MCV RBC AUTO: 79 FL (ref 82–98)
MONOCYTES # BLD AUTO: 0.57 THOUSAND/ΜL (ref 0.17–1.22)
MONOCYTES NFR BLD AUTO: 6 % (ref 4–12)
NEUTROPHILS # BLD AUTO: 7.06 THOUSANDS/ΜL (ref 1.85–7.62)
NEUTS SEG NFR BLD AUTO: 76 % (ref 43–75)
NRBC BLD AUTO-RTO: 0 /100 WBCS
PLATELET # BLD AUTO: 270 THOUSANDS/UL (ref 149–390)
PMV BLD AUTO: 8.9 FL (ref 8.9–12.7)
POTASSIUM SERPL-SCNC: 3.9 MMOL/L (ref 3.5–5.3)
PROT SERPL-MCNC: 7.2 G/DL (ref 6.4–8.2)
PROTHROMBIN TIME: 20.2 SECONDS (ref 11.6–14.5)
RBC # BLD AUTO: 4.41 MILLION/UL (ref 3.88–5.62)
SODIUM SERPL-SCNC: 138 MMOL/L (ref 136–145)
WBC # BLD AUTO: 9.34 THOUSAND/UL (ref 4.31–10.16)

## 2019-11-13 PROCEDURE — 36415 COLL VENOUS BLD VENIPUNCTURE: CPT

## 2019-11-13 PROCEDURE — 85025 COMPLETE CBC W/AUTO DIFF WBC: CPT

## 2019-11-13 PROCEDURE — 80053 COMPREHEN METABOLIC PANEL: CPT

## 2019-11-13 PROCEDURE — 85610 PROTHROMBIN TIME: CPT

## 2019-11-13 NOTE — TELEPHONE ENCOUNTER
----- Message from Deonna Craig sent at 11/13/2019  2:21 PM EST -----  Increase to 4mg Mon, Tues, Thurs, Fri, and Sat, 3 mg on Sun and Wed  Recheck 1 week   Deonna Craig

## 2019-11-13 NOTE — TELEPHONE ENCOUNTER
Spoke with Janine Montiel , pt's daughter, gave INR instructions  Also made her aware that pt's cell phone is not working and his home number that he had given us as an updated number is the [employer]  Janine Montiel stated she will call us back with a correct contact number         Hans Rater, MA

## 2019-11-18 DIAGNOSIS — M79.672 PAIN IN BOTH FEET: ICD-10-CM

## 2019-11-18 DIAGNOSIS — M79.671 PAIN IN BOTH FEET: ICD-10-CM

## 2019-11-18 DIAGNOSIS — M05.9 SEROPOSITIVE RHEUMATOID ARTHRITIS (HCC): ICD-10-CM

## 2019-11-18 DIAGNOSIS — I10 ESSENTIAL HYPERTENSION: ICD-10-CM

## 2019-11-18 RX ORDER — GABAPENTIN 100 MG/1
100 CAPSULE ORAL 2 TIMES DAILY
Qty: 180 CAPSULE | Refills: 1 | Status: SHIPPED | OUTPATIENT
Start: 2019-11-18 | End: 2019-11-25 | Stop reason: SDUPTHER

## 2019-11-18 RX ORDER — AMLODIPINE BESYLATE 10 MG/1
5 TABLET ORAL DAILY
Qty: 45 TABLET | Refills: 1 | Status: SHIPPED | OUTPATIENT
Start: 2019-11-18 | End: 2019-11-25 | Stop reason: SDUPTHER

## 2019-11-18 RX ORDER — HYDROXYCHLOROQUINE SULFATE 200 MG/1
200 TABLET, FILM COATED ORAL 2 TIMES DAILY WITH MEALS
Qty: 60 TABLET | Refills: 5 | Status: CANCELLED | OUTPATIENT
Start: 2019-11-18 | End: 2020-05-16

## 2019-11-18 NOTE — TELEPHONE ENCOUNTER
Pt is req refills of gabapentin 100 mg, amlodipine beslate 10 mg  He thinks he needs a refill of plaquenil 200 mg  He dropped the bottle under his car and couldn't read the name  He said it started with a "p" this is the only one I see

## 2019-11-20 ENCOUNTER — ANTICOAG VISIT (OUTPATIENT)
Dept: FAMILY MEDICINE CLINIC | Facility: CLINIC | Age: 84
End: 2019-11-20

## 2019-11-20 ENCOUNTER — APPOINTMENT (OUTPATIENT)
Dept: LAB | Facility: CLINIC | Age: 84
End: 2019-11-20
Payer: MEDICARE

## 2019-11-20 DIAGNOSIS — I48.0 PAROXYSMAL ATRIAL FIBRILLATION (HCC): ICD-10-CM

## 2019-11-20 LAB
INR PPP: 2.06 (ref 0.84–1.19)
PROTHROMBIN TIME: 22.7 SECONDS (ref 11.6–14.5)

## 2019-11-20 PROCEDURE — 85610 PROTHROMBIN TIME: CPT

## 2019-11-20 PROCEDURE — 36415 COLL VENOUS BLD VENIPUNCTURE: CPT

## 2019-11-21 ENCOUNTER — OFFICE VISIT (OUTPATIENT)
Dept: PODIATRY | Facility: CLINIC | Age: 84
End: 2019-11-21
Payer: MEDICARE

## 2019-11-21 VITALS
HEIGHT: 69 IN | WEIGHT: 144 LBS | RESPIRATION RATE: 16 BRPM | BODY MASS INDEX: 21.33 KG/M2 | DIASTOLIC BLOOD PRESSURE: 68 MMHG | HEART RATE: 66 BPM | SYSTOLIC BLOOD PRESSURE: 126 MMHG

## 2019-11-21 DIAGNOSIS — E11.42 DIABETIC POLYNEUROPATHY ASSOCIATED WITH TYPE 2 DIABETES MELLITUS (HCC): Primary | ICD-10-CM

## 2019-11-21 DIAGNOSIS — I70.209 PERIPHERAL ARTERIOSCLEROSIS (HCC): ICD-10-CM

## 2019-11-21 DIAGNOSIS — M79.671 PAIN IN BOTH FEET: ICD-10-CM

## 2019-11-21 DIAGNOSIS — M79.672 PAIN IN BOTH FEET: ICD-10-CM

## 2019-11-21 DIAGNOSIS — B35.1 ONYCHOMYCOSIS: ICD-10-CM

## 2019-11-21 PROCEDURE — 11721 DEBRIDE NAIL 6 OR MORE: CPT | Performed by: PODIATRIST

## 2019-11-21 NOTE — PROGRESS NOTES
Assessment/Plan:  Pain upon ambulation   Peripheral artery disease   Diabetic neuropathy   Mycosis of nail      Plan   Diabetic foot exam performed   All mycotic nails debrided without pain or complication               There are no diagnoses linked to this encounter        Subjective:  patient complains of pain in his toes and feet with ambulation   No history of trauma             Past Medical History:   Diagnosis Date    CAD (coronary artery disease)      [facility]      Dupuytren contracture      Dyslipidemia      Essential hypertension      ICD (implantable cardioverter-defibrillator) in place      Lumbosacral radiculopathy at S1      Osteoarthritis      Rheumatoid [facility]      SNHL (sensorineural hearing loss)      Spinal stenosis      Type 2 diabetes mellitus ([facility] )                     Past Surgical History:   Procedure Laterality Date    AORTIC VALVE REPLACEMENT        CHOLECYSTECTOMY             No Known Allergies        Current Outpatient Prescriptions:     albuterol (PROVENTIL HFA,VENTOLIN HFA) 90 mcg/act inhaler, Inhale 2 puffs, Disp: , Rfl:     amLODIPine (NORVASC) 10 mg tablet, take 1/2  tablet daily, Disp: , Rfl:     benazepril (LOTENSIN) 5 mg tablet, Take 5 mg by mouth, Disp: , Rfl:     fluticasone (FLONASE) 50 mcg/act nasal spray, 1 spray into each nostril daily, Disp: , Rfl:     folic acid (FOLVITE) 1 mg tablet, Take 1 mg by mouth, Disp: , Rfl:     gabapentin (NEURONTIN) 100 mg capsule, Take 100 mg by mouth, Disp: , Rfl:     glucose blood (ONE TOUCH ULTRA TEST) test strip, Check sugar daily and as needed  Dx: E11 9, Disp: , Rfl:     glucose blood test strip, Check sugar daily and as needed  Dx: E11 9, Disp: , Rfl:     hydroxychloroquine (PLAQUENIL) 200 mg tablet, Take 1 tablet (200 mg total) by mouth 2 (two) times a day with meals for 30 days, Disp: 60 tablet, Rfl: 5    Lancet Devices (ONE TOUCH DELICA LANCING DEV) MISC, Check sugar daily and as needed  Dx: E11 9, Disp: , Rfl:     methotrexate (RHEUMATREX) 2 5 MG tablet, Take 4 tablets (10 mg total) by mouth once a week, Disp: 16 tablet, Rfl: 2    metoprolol succinate (TOPROL XL) 50 mg 24 hr tablet, Take 50 mg by mouth, Disp: , Rfl:     ONETOUCH DELICA LANCETS 81X MISC, Check sugar daily and as needed  Dx: E11 9, Disp: , Rfl:     potassium chloride (K-DUR,KLOR-CON) 10 mEq tablet, Take 10 mEq by mouth, Disp: , Rfl:     PredniSONE 5 MG TBEC, Take 1 tablet (5 mg total) by mouth daily, Disp: 30 tablet, Rfl: 5    rosuvastatin (CRESTOR) 10 MG tablet, 1 by mouth daily, Disp: , Rfl:     warfarin (COUMADIN) 2 mg tablet, Take 2 mg on Mondays and Fridays and 4 mg all other days of the week or as directed, Disp: , Rfl:            Patient Active Problem List   Diagnosis    Patient refuses to disclose advance directive information    Bilateral sensorineural hearing loss    Biventricular ICD (implantable cardioverter-defibrillator) in place    Cardi[facility]    CHB (complete heart block) ([facility]    Coronary artery disease involving native coronary artery of native heart without angina pectoris    Dupuytren contracture    Dyslipidemia, goal LDL below 70    Hyperplasia of prostate with lower urinary tract symptoms (LUTS)    Former smoker    Essential hypertension    Lymphedema of left leg    Osteoarthritis of multiple joints    Pacemaker    Paroxysmal atrial fibrillation (HCC)    RA (rheumatoid arthritis) ([facility]    Restrictive lung disease    RBBB    S/P AVR (aortic valve replacement)    S/P primary angioplasty with coronary stent    Spinal stenosis of lumbar region without neurogenic claudication    Type 2 diabetes mellitus with hemoglobin A1c goal of less than 7 0% ([facility])    Abnormal TSH             Patient ID: Nitin Burgos is a 80 y  o  male      HPI     The following portions of the patient's history were reviewed and updated as appropriate: allergies, current medications, past family history, past medical history, past social history, past surgical history and problem list      Review of Systems       Objective:  Patient's shoes and socks removed    Foot Exam     Right Foot/Ankle      Inspection and Palpation  Skin Exam: dry skin;      Neurovascular  Dorsalis pedis: 1+  Posterior tibial: 1+        Left Foot/Ankle       Inspection and Palpation  Skin Exam: dry skin;      Neurovascular  Dorsalis pedis: 1+  Posterior tibial: 1+        Physical Exam   Cardiovascular: Pulses are weak pulses  Pulses:       Dorsalis pedis pulses are 1+ on the right side, and 1+ on the left side         Posterior tibial pulses are 1+ on the right side, and 1+ on the left side  Feet:   Right Foot:   Skin Integrity: Positive for warmth and dry skin  Left Foot:   Skin Integrity: Positive for warmth and dry skin  Patient's shoes and socks removed  Right Foot/Ankle   Right Foot Inspection  Skin Exam: dry skin and warmth               Toe Exam: swelling and erythema  Sensory   Vibration: diminished  Proprioception: diminished   Monofilament testing: intact  Vascular  Capillary refills: elevated  The right DP pulse is 1+  The right PT pulse is 1+       Left Foot/Ankle  Left Foot Inspection  Skin Exam: dry skin and warmth                 Sensory   Vibration: diminished  Proprioception: diminished  Monofilament: intact  Vascular  Capillary refills: elevated  The left DP pulse is 1+  The left PT pulse is 1+       Patient's shoes and socks removed  Right Foot/Ankle   Right Foot Inspection  Skin Exam: dry skin                           Left Foot/Ankle  Left Foot Inspection  Skin Exam: dry skin     Patient's shoes and socks removed  Assign Risk Category:  Deformity present; Loss of protective sensation; Weak pulses       Risk: 2

## 2019-11-25 ENCOUNTER — TELEPHONE (OUTPATIENT)
Dept: FAMILY MEDICINE CLINIC | Facility: CLINIC | Age: 84
End: 2019-11-25

## 2019-11-25 DIAGNOSIS — M79.671 PAIN IN BOTH FEET: ICD-10-CM

## 2019-11-25 DIAGNOSIS — M79.672 PAIN IN BOTH FEET: ICD-10-CM

## 2019-11-25 DIAGNOSIS — I10 ESSENTIAL HYPERTENSION: ICD-10-CM

## 2019-11-25 RX ORDER — GABAPENTIN 100 MG/1
100 CAPSULE ORAL 2 TIMES DAILY
Qty: 180 CAPSULE | Refills: 1 | Status: SHIPPED | OUTPATIENT
Start: 2019-11-25

## 2019-11-25 RX ORDER — AMLODIPINE BESYLATE 10 MG/1
5 TABLET ORAL DAILY
Qty: 45 TABLET | Refills: 1 | Status: SHIPPED | OUTPATIENT
Start: 2019-11-25 | End: 2019-12-30 | Stop reason: SDUPTHER

## 2019-11-27 ENCOUNTER — ANTICOAG VISIT (OUTPATIENT)
Dept: FAMILY MEDICINE CLINIC | Facility: CLINIC | Age: 84
End: 2019-11-27

## 2019-11-27 ENCOUNTER — APPOINTMENT (OUTPATIENT)
Dept: LAB | Facility: CLINIC | Age: 84
End: 2019-11-27
Payer: MEDICARE

## 2019-11-27 DIAGNOSIS — I10 ESSENTIAL HYPERTENSION: ICD-10-CM

## 2019-11-27 DIAGNOSIS — M79.671 PAIN IN BOTH FEET: ICD-10-CM

## 2019-11-27 DIAGNOSIS — I48.0 PAROXYSMAL ATRIAL FIBRILLATION (HCC): ICD-10-CM

## 2019-11-27 DIAGNOSIS — M79.672 PAIN IN BOTH FEET: ICD-10-CM

## 2019-11-27 LAB
INR PPP: 2.17 (ref 0.84–1.19)
INR PPP: 2.17 (ref 0.84–1.19)
PROTHROMBIN TIME: 23.7 SECONDS (ref 11.6–14.5)

## 2019-11-27 PROCEDURE — 36415 COLL VENOUS BLD VENIPUNCTURE: CPT

## 2019-11-27 PROCEDURE — 85610 PROTHROMBIN TIME: CPT

## 2019-11-27 RX ORDER — AMLODIPINE BESYLATE 10 MG/1
5 TABLET ORAL DAILY
Qty: 45 TABLET | Refills: 1 | Status: CANCELLED | OUTPATIENT
Start: 2019-11-27

## 2019-11-27 RX ORDER — GABAPENTIN 100 MG/1
100 CAPSULE ORAL 2 TIMES DAILY
Qty: 180 CAPSULE | Refills: 1 | Status: CANCELLED | OUTPATIENT
Start: 2019-11-27

## 2019-11-27 NOTE — TELEPHONE ENCOUNTER
----- Message from Deonna Craig sent at 11/27/2019  1:51 PM EST -----  Continue same dose, recheck 1 week   Deonna Craig

## 2019-12-04 ENCOUNTER — TELEPHONE (OUTPATIENT)
Dept: FAMILY MEDICINE CLINIC | Facility: CLINIC | Age: 84
End: 2019-12-04

## 2019-12-04 ENCOUNTER — ANTICOAG VISIT (OUTPATIENT)
Dept: FAMILY MEDICINE CLINIC | Facility: CLINIC | Age: 84
End: 2019-12-04

## 2019-12-04 ENCOUNTER — APPOINTMENT (OUTPATIENT)
Dept: LAB | Facility: CLINIC | Age: 84
End: 2019-12-04
Payer: MEDICARE

## 2019-12-04 DIAGNOSIS — I48.0 PAROXYSMAL ATRIAL FIBRILLATION (HCC): ICD-10-CM

## 2019-12-04 LAB
INR PPP: 2.6 (ref 0.84–1.19)
INR PPP: 2.6 (ref 0.84–1.19)
PROTHROMBIN TIME: 27.3 SECONDS (ref 11.6–14.5)

## 2019-12-04 PROCEDURE — 36415 COLL VENOUS BLD VENIPUNCTURE: CPT

## 2019-12-04 PROCEDURE — 85610 PROTHROMBIN TIME: CPT

## 2019-12-04 NOTE — TELEPHONE ENCOUNTER
----- Message from Karmen Vasquez sent at 12/4/2019  4:36 PM EST -----  Continue same dose, recheck 1 week   Karmen Vasquez

## 2019-12-11 ENCOUNTER — ANTICOAG VISIT (OUTPATIENT)
Dept: FAMILY MEDICINE CLINIC | Facility: CLINIC | Age: 84
End: 2019-12-11

## 2019-12-11 ENCOUNTER — APPOINTMENT (OUTPATIENT)
Dept: LAB | Facility: CLINIC | Age: 84
End: 2019-12-11
Payer: MEDICARE

## 2019-12-11 DIAGNOSIS — I48.0 PAROXYSMAL ATRIAL FIBRILLATION (HCC): ICD-10-CM

## 2019-12-11 LAB
INR PPP: 3.46 (ref 0.84–1.19)
PROTHROMBIN TIME: 34.3 SECONDS (ref 11.6–14.5)

## 2019-12-11 PROCEDURE — 85610 PROTHROMBIN TIME: CPT

## 2019-12-11 PROCEDURE — 36415 COLL VENOUS BLD VENIPUNCTURE: CPT

## 2019-12-18 ENCOUNTER — OFFICE VISIT (OUTPATIENT)
Dept: URGENT CARE | Facility: CLINIC | Age: 84
End: 2019-12-18
Payer: MEDICARE

## 2019-12-18 ENCOUNTER — APPOINTMENT (OUTPATIENT)
Dept: LAB | Facility: CLINIC | Age: 84
End: 2019-12-18
Payer: MEDICARE

## 2019-12-18 ENCOUNTER — ANTICOAG VISIT (OUTPATIENT)
Dept: FAMILY MEDICINE CLINIC | Facility: CLINIC | Age: 84
End: 2019-12-18

## 2019-12-18 VITALS
BODY MASS INDEX: 21.33 KG/M2 | SYSTOLIC BLOOD PRESSURE: 144 MMHG | OXYGEN SATURATION: 95 % | DIASTOLIC BLOOD PRESSURE: 63 MMHG | RESPIRATION RATE: 20 BRPM | HEART RATE: 70 BPM | TEMPERATURE: 96.2 F | WEIGHT: 144 LBS | HEIGHT: 69 IN

## 2019-12-18 DIAGNOSIS — H92.01 RIGHT EAR PAIN: Primary | ICD-10-CM

## 2019-12-18 DIAGNOSIS — I48.0 PAROXYSMAL ATRIAL FIBRILLATION (HCC): ICD-10-CM

## 2019-12-18 LAB
INR PPP: 2.72 (ref 0.84–1.19)
INR PPP: 2.72 (ref 0.84–1.19)
PROTHROMBIN TIME: 28.3 SECONDS (ref 11.6–14.5)

## 2019-12-18 PROCEDURE — 99213 OFFICE O/P EST LOW 20 MIN: CPT | Performed by: PHYSICIAN ASSISTANT

## 2019-12-18 PROCEDURE — 85610 PROTHROMBIN TIME: CPT

## 2019-12-18 PROCEDURE — 36415 COLL VENOUS BLD VENIPUNCTURE: CPT

## 2019-12-18 NOTE — PATIENT INSTRUCTIONS
No infection no cerumen impaction  Likely from congestion of eustation tube  Recommend f/u with ENT for hearing test and evaluation  Recommend flonase since cannot take sudafed for decongestion  Follow up with PCP in 3-5 days  Proceed to  ER if symptoms worsen

## 2019-12-18 NOTE — PROGRESS NOTES
[facility]        NAME: Adelaida Winter is a 80 y o  male  : 1934    MRN: [id number]  DATE: 2019  TIME: 4:54 PM    Assessment and Plan   Right ear pain [H92 01]  1  Right ear pain           Patient Instructions   No infection no cerumen impaction  Likely from congestion of eustation tube  Recommend f/u with ENT for hearing test and evaluation  Recommend flonase since cannot take sudafed for decongestion  Follow up with PCP in 3-5 days  Proceed to  ER if symptoms worsen  Chief Complaint     Chief Complaint   Patient presents with    Earache     right sided ear pain and fullness, c/o sharp pain that comes and goes, radiates to his right eye         History of Present Illness       Lester Milan is an 81 y/o male who presents to the clinic c/o intermittent right ear pain x 1 week  He states that the pain is sharp when it happens and shoots around to the right side of his face  He also notes a right clogged sensation and decreased hearing in that ear  He states that voices are muffled  He denies any fever, chills, ear discharge, nasal congestion, or sinus pain  He does admit to q-tip use  Review of Systems   Review of Systems   Constitutional: Negative for chills and fever  HENT: Positive for ear pain and hearing loss  Negative for congestion, ear discharge, sinus pressure and sinus pain  Neurological: Negative for dizziness, light-headedness and headaches       Current Medications       Current Outpatient Medications:     amLODIPine (NORVASC) 10 mg tablet, Take 0 5 tablets (5 mg total) by mouth daily, Disp: 45 tablet, Rfl: 1    aspirin 81 MG tablet, Take 81 mg by mouth every morning, Disp: , Rfl:     folic acid (FOLVITE) 1 mg tablet, Take 1 tablet (1 mg total) by mouth daily, Disp: 30 tablet, Rfl: 11    gabapentin (NEURONTIN) 100 mg capsule, Take 1 capsule (100 mg total) by mouth 2 (two) times a day, Disp: 180 capsule, Rfl: 1    hydroxychloroquine (PLAQUENIL) 200 mg tablet, Take 1 tablet (200 mg total) by mouth 2 (two) times a day with meals, Disp: 60 tablet, Rfl: 5    methotrexate (RHEUMATREX) 2 5 MG tablet, Take 6 tablets (15 mg total) by mouth once a week, Disp: 24 tablet, Rfl: 2    methotrexate 2 5 mg tablet, , Disp: , Rfl: 0    metoprolol succinate (TOPROL XL) 50 mg 24 hr tablet, Take 1 tablet (50 mg total) by mouth 2 (two) times a day, Disp: 180 tablet, Rfl: 1    OS-ROD CALCIUM + D3 500-200 MG-UNIT TABS, , Disp: , Rfl: 1    warfarin (COUMADIN) 1 mg tablet, 1/2 tab daily with other coumadin pill, and or as directed, Disp: 90 tablet, Rfl: 1    warfarin (COUMADIN) 2 mg tablet, 2 mg alternating with 3 mg daily or as directed, Disp: 90 tablet, Rfl: 1    warfarin (COUMADIN) 3 mg tablet, Take 1 tablet (3 mg total) by mouth daily OR AS DIRECTED, Disp: 90 tablet, Rfl: 3    warfarin (COUMADIN) 4 mg tablet, Take 1 tablet (4 mg total) by mouth daily, Disp: 90 tablet, Rfl: 3    calcium carbonate-vitamin D (OSCAL-D) 500 mg-200 units per tablet, Take 1 tablet by mouth 2 (two) times a day with meals (Patient not taking: Reported on 9/30/2019), Disp: 60 tablet, Rfl: 11    fluticasone (FLONASE) 50 mcg/act nasal spray, 2 sprays into each nostril daily (Patient not taking: Reported on 9/12/2019), Disp: 16 g, Rfl: 1    Current Allergies     Allergies as of 12/18/2019    (No Known Allergies)            The following portions of the patient's history were reviewed and updated as appropriate: allergies, current medications, past family history, past medical history, past social history, past surgical history and problem list      Past Medical History:   Diagnosis Date    CAD (coronary artery disease)     Cardiomyopathy ([address] )     Dupuytren contracture     Dyslipidemia     Essential hypertension     ICD (implantable cardioverter-defibrillator) in place     Lumbosacral radiculopathy at S1     Osteoarthritis     Rheumatoid arthritis (HCC)     SNHL (sensorineural hearing loss)     Spinal stenosis     Type 2 diabetes mellitus (HCC)        Past Surgical History:   Procedure Laterality Date    AORTIC VALVE REPLACEMENT      CHOLECYSTECTOMY         Family History   Problem Relation Age of Onset    Heart disease Brother     Cancer Brother     COPD Father     Diabetes Mother          Medications have been verified  Objective   /63   Pulse 70   Temp (!) 96 2 °F (35 7 °C)   Resp 20   Ht 5' 9" (1 753 m)   Wt 65 3 kg (144 lb)   SpO2 95%   BMI 21 27 kg/m²        Physical Exam     Physical Exam   Constitutional: He appears well-developed and well-nourished  No distress  HENT:   Right Ear: Tympanic membrane, external ear and ear canal normal  No drainage  No foreign bodies  Tympanic membrane is not perforated and not erythematous  Left Ear: Tympanic membrane, external ear and ear canal normal  No drainage  No foreign bodies  Tympanic membrane is not perforated and not erythematous  Decreased hearing but is consistent bilaterally   Cardiovascular: Normal rate, regular rhythm and normal heart sounds  Pulmonary/Chest: Effort normal and breath sounds normal    Nursing note and vitals reviewed

## 2019-12-19 ENCOUNTER — REMOTE DEVICE CLINIC VISIT (OUTPATIENT)
Dept: CARDIOLOGY CLINIC | Facility: CLINIC | Age: 84
End: 2019-12-19
Payer: MEDICARE

## 2019-12-19 DIAGNOSIS — Z95.810 PRESENCE OF AUTOMATIC CARDIOVERTER/DEFIBRILLATOR (AICD): Primary | ICD-10-CM

## 2019-12-19 PROCEDURE — 93296 REM INTERROG EVL PM/IDS: CPT | Performed by: INTERNAL MEDICINE

## 2019-12-19 PROCEDURE — 93295 DEV INTERROG REMOTE 1/2/MLT: CPT | Performed by: INTERNAL MEDICINE

## 2019-12-19 PROCEDURE — 93297 REM INTERROG DEV EVAL ICPMS: CPT | Performed by: INTERNAL MEDICINE

## 2019-12-19 NOTE — PROGRESS NOTES
Results for orders placed or performed in visit on 12/19/19   Cardiac EP device report    Narrative    MDT CRT-D  CARELINK TRANSMISSION: BATTERY VOLTAGE ADEQUATE  (4 4 YRS)  AP 20% BVP 98%  ALL AVAILABLE LEAD PARAMETERS WITHIN NORMAL LIMITS  1 NSVT EPISODES DETECTED  133 AT/AF EPISODES LONGEST 23 MIN  (0 8% OF TIME)  OPTI-VOL WITHIN NORMAL LIMITS  VENTRICULAR SENSE EPISODES NOTED  NORMAL DEVICE FUNCTION  ----DRAPER

## 2019-12-30 ENCOUNTER — OFFICE VISIT (OUTPATIENT)
Dept: FAMILY MEDICINE CLINIC | Facility: CLINIC | Age: 84
End: 2019-12-30
Payer: MEDICARE

## 2019-12-30 VITALS
BODY MASS INDEX: 19.93 KG/M2 | WEIGHT: 134.6 LBS | SYSTOLIC BLOOD PRESSURE: 122 MMHG | OXYGEN SATURATION: 94 % | RESPIRATION RATE: 18 BRPM | DIASTOLIC BLOOD PRESSURE: 64 MMHG | HEART RATE: 64 BPM | HEIGHT: 69 IN | TEMPERATURE: 95.8 F

## 2019-12-30 DIAGNOSIS — R05.3 CHRONIC COUGH: ICD-10-CM

## 2019-12-30 DIAGNOSIS — D64.9 ANEMIA, UNSPECIFIED TYPE: Primary | ICD-10-CM

## 2019-12-30 DIAGNOSIS — M06.9 RHEUMATOID ARTHRITIS, INVOLVING UNSPECIFIED SITE, UNSPECIFIED RHEUMATOID FACTOR PRESENCE: ICD-10-CM

## 2019-12-30 DIAGNOSIS — M05.9 SEROPOSITIVE RHEUMATOID ARTHRITIS (HCC): ICD-10-CM

## 2019-12-30 DIAGNOSIS — I48.0 PAROXYSMAL ATRIAL FIBRILLATION (HCC): ICD-10-CM

## 2019-12-30 DIAGNOSIS — F32.A DEPRESSION, UNSPECIFIED DEPRESSION TYPE: ICD-10-CM

## 2019-12-30 DIAGNOSIS — I10 ESSENTIAL HYPERTENSION: ICD-10-CM

## 2019-12-30 DIAGNOSIS — R63.4 WEIGHT LOSS: ICD-10-CM

## 2019-12-30 PROCEDURE — 99214 OFFICE O/P EST MOD 30 MIN: CPT | Performed by: NURSE PRACTITIONER

## 2019-12-30 RX ORDER — ESCITALOPRAM OXALATE 10 MG/1
10 TABLET ORAL DAILY
Qty: 90 TABLET | Refills: 1 | Status: SHIPPED | OUTPATIENT
Start: 2019-12-30

## 2019-12-30 RX ORDER — FOLIC ACID 1 MG/1
1 TABLET ORAL DAILY
Qty: 90 TABLET | Refills: 1 | Status: SHIPPED | OUTPATIENT
Start: 2019-12-30

## 2019-12-30 RX ORDER — METOPROLOL SUCCINATE 50 MG/1
50 TABLET, EXTENDED RELEASE ORAL 2 TIMES DAILY
Qty: 180 TABLET | Refills: 1 | Status: SHIPPED | OUTPATIENT
Start: 2019-12-30

## 2019-12-30 RX ORDER — AMLODIPINE BESYLATE 10 MG/1
5 TABLET ORAL DAILY
Qty: 45 TABLET | Refills: 1 | Status: SHIPPED | OUTPATIENT
Start: 2019-12-30

## 2019-12-30 RX ORDER — HYDROXYCHLOROQUINE SULFATE 200 MG/1
200 TABLET, FILM COATED ORAL 2 TIMES DAILY WITH MEALS
Qty: 180 TABLET | Refills: 1 | Status: SHIPPED | OUTPATIENT
Start: 2019-12-30 | End: 2020-02-10 | Stop reason: SDUPTHER

## 2019-12-30 NOTE — PROGRESS NOTES
Assessment/Plan:     CXR done in October  Will chest CT chest given smoking history, weight loss, and anemia  Start on Lexapro for depression  He will return here in 1 month for med check unless he is able to see a new PCP    1  Anemia, unspecified type  -     CBC and differential; Future    2  Chronic cough  -     CT chest w contrast; Future; Expected date: 12/30/2019    3  Essential hypertension  Assessment & Plan:  Stable on current medications  Orders:  -     metoprolol succinate (TOPROL XL) 50 mg 24 hr tablet; Take 1 tablet (50 mg total) by mouth 2 (two) times a day  -     amLODIPine (NORVASC) 10 mg tablet; Take 0 5 tablets (5 mg total) by mouth daily    4  Seropositive rheumatoid arthritis (HCC)  -     hydroxychloroquine (PLAQUENIL) 200 mg tablet; Take 1 tablet (200 mg total) by mouth 2 (two) times a day with meals  -     folic acid (FOLVITE) 1 mg tablet; Take 1 tablet (1 mg total) by mouth daily    5  Depression, unspecified depression type  -     escitalopram (LEXAPRO) 10 mg tablet; Take 1 tablet (10 mg total) by mouth daily    6  Weight loss    7  Paroxysmal atrial fibrillation [facility])  Assessment & Plan:  He is interested in switching to Eliquis or Xarelto  Will discuss with cardiology  8  Rheumatoid arthritis, involving unspecified site, unspecified rheumatoid factor presence ([facility] )          There are no Patient Instructions on file for this visit  Return if symptoms worsen or fail to improve  Subjective:      Patient ID: Areli Mckeon is a 80 y o  male  Chief Complaint   Patient presents with    Follow-up     [facility]       Here today for med check and follow up  He has lost 10 pounds since his last visit  He does eat dinner and drinks Muscle Milk daily, but does not have much of an appetite  Seeing ENT in January for persistent right discomfort  With complaints of persistent cough  Reports this is worse at nighttime  No significant SOB or wheezing      Has been feeling depressed since his wife passed away  He plans on looking for a new PCP closer to his senior home  The following portions of the patient's history were reviewed and updated as appropriate: allergies, current medications, past family history, past medical history, past social history, past surgical history and problem list     Review of Systems   Constitutional: Positive for fatigue and unexpected weight change  Negative for chills and fever  Respiratory: Negative for cough, shortness of breath and wheezing  Cardiovascular: Negative for chest pain, palpitations and leg swelling  Gastrointestinal: Negative for abdominal pain, diarrhea, nausea and vomiting  Skin: Negative for rash  Neurological: Negative for dizziness and headaches     Psychiatric/Behavioral:        See HPI         Current Outpatient Medications   Medication Sig Dispense Refill    amLODIPine (NORVASC) 10 mg tablet Take 0 5 tablets (5 mg total) by mouth daily 45 tablet 1    aspirin 81 MG tablet Take 81 mg by mouth every morning      fluticasone (FLONASE) 50 mcg/act nasal spray 2 sprays into each nostril daily 16 g 1    folic acid (FOLVITE) 1 mg tablet Take 1 tablet (1 mg total) by mouth daily 90 tablet 1    gabapentin (NEURONTIN) 100 mg capsule Take 1 capsule (100 mg total) by mouth 2 (two) times a day 180 capsule 1    hydroxychloroquine (PLAQUENIL) 200 mg tablet Take 1 tablet (200 mg total) by mouth 2 (two) times a day with meals 180 tablet 1    methotrexate (RHEUMATREX) 2 5 MG tablet Take 6 tablets (15 mg total) by mouth once a week 24 tablet 2    methotrexate 2 5 mg tablet   0    metoprolol succinate (TOPROL XL) 50 mg 24 hr tablet Take 1 tablet (50 mg total) by mouth 2 (two) times a day 180 tablet 1    OS-ROD CALCIUM + D3 500-200 MG-UNIT TABS   1    warfarin (COUMADIN) 1 mg tablet 1/2 tab daily with other coumadin pill, and or as directed 90 tablet 1    warfarin (COUMADIN) 2 mg tablet 2 mg alternating with 3 mg daily or as directed 90 tablet 1    warfarin (COUMADIN) 3 mg tablet Take 1 tablet (3 mg total) by mouth daily OR AS DIRECTED 90 tablet 3    warfarin (COUMADIN) 4 mg tablet Take 1 tablet (4 mg total) by mouth daily 90 tablet 3    calcium carbonate-vitamin D (OSCAL-D) 500 mg-200 units per tablet Take 1 tablet by mouth 2 (two) times a day with meals (Patient not taking: Reported on 9/30/2019) 60 tablet 11    escitalopram (LEXAPRO) 10 mg tablet Take 1 tablet (10 mg total) by mouth daily 90 tablet 1     No current facility-administered medications for this visit  Objective:    /64   Pulse 64   Temp (!) 95 8 °F (35 4 °C)   Resp 18   Ht 5' 9" (1 753 m)   Wt 61 1 kg (134 lb 9 6 oz)   SpO2 94%   BMI 19 88 kg/m²        Physical Exam   Constitutional: He appears well-developed and well-nourished  HENT:   Head: Normocephalic and atraumatic  Right Ear: Tympanic membrane, external ear and ear canal normal    Left Ear: Tympanic membrane, external ear and ear canal normal    Nose: No mucosal edema or rhinorrhea  Mouth/Throat: Uvula is midline, oropharynx is clear and moist and mucous membranes are normal    Eyes: Conjunctivae are normal    Neck: Neck supple  No edema present  No thyromegaly present  Cardiovascular: Normal rate, regular rhythm, normal heart sounds and intact distal pulses  No murmur heard  Pulmonary/Chest: Effort normal and breath sounds normal    Abdominal: Bowel sounds are normal  He exhibits no distension  There is no splenomegaly or hepatomegaly  There is no tenderness  Lymphadenopathy:        Right cervical: No superficial cervical adenopathy present  Left cervical: No superficial cervical adenopathy present  Skin: Skin is warm, dry and intact  No rash noted  Psychiatric: He has a normal mood and affect  Nursing note and vitals reviewed               Scarlett Diaz

## 2019-12-31 ENCOUNTER — TELEPHONE (OUTPATIENT)
Dept: FAMILY MEDICINE CLINIC | Facility: CLINIC | Age: 84
End: 2019-12-31

## 2019-12-31 ENCOUNTER — ANTICOAG VISIT (OUTPATIENT)
Dept: FAMILY MEDICINE CLINIC | Facility: CLINIC | Age: 84
End: 2019-12-31

## 2019-12-31 ENCOUNTER — APPOINTMENT (OUTPATIENT)
Dept: LAB | Facility: CLINIC | Age: 84
End: 2019-12-31
Payer: MEDICARE

## 2019-12-31 DIAGNOSIS — D64.9 ANEMIA, UNSPECIFIED TYPE: ICD-10-CM

## 2019-12-31 DIAGNOSIS — I10 ESSENTIAL HYPERTENSION: Primary | ICD-10-CM

## 2019-12-31 DIAGNOSIS — F32.A DEPRESSION, UNSPECIFIED DEPRESSION TYPE: ICD-10-CM

## 2019-12-31 DIAGNOSIS — I48.0 PAROXYSMAL ATRIAL FIBRILLATION (HCC): ICD-10-CM

## 2019-12-31 LAB
BASOPHILS # BLD AUTO: 0.07 THOUSANDS/ΜL (ref 0–0.1)
BASOPHILS NFR BLD AUTO: 1 % (ref 0–1)
EOSINOPHIL # BLD AUTO: 0.07 THOUSAND/ΜL (ref 0–0.61)
EOSINOPHIL NFR BLD AUTO: 1 % (ref 0–6)
ERYTHROCYTE [DISTWIDTH] IN BLOOD BY AUTOMATED COUNT: 20.5 % (ref 11.6–15.1)
HCT VFR BLD AUTO: 37.4 % (ref 36.5–49.3)
HGB BLD-MCNC: 11.2 G/DL (ref 12–17)
IMM GRANULOCYTES # BLD AUTO: 0.04 THOUSAND/UL (ref 0–0.2)
IMM GRANULOCYTES NFR BLD AUTO: 0 % (ref 0–2)
INR PPP: 1.21 (ref 0.84–1.19)
LYMPHOCYTES # BLD AUTO: 1.7 THOUSANDS/ΜL (ref 0.6–4.47)
LYMPHOCYTES NFR BLD AUTO: 16 % (ref 14–44)
MCH RBC QN AUTO: 24.1 PG (ref 26.8–34.3)
MCHC RBC AUTO-ENTMCNC: 29.9 G/DL (ref 31.4–37.4)
MCV RBC AUTO: 80 FL (ref 82–98)
MONOCYTES # BLD AUTO: 0.91 THOUSAND/ΜL (ref 0.17–1.22)
MONOCYTES NFR BLD AUTO: 8 % (ref 4–12)
NEUTROPHILS # BLD AUTO: 7.98 THOUSANDS/ΜL (ref 1.85–7.62)
NEUTS SEG NFR BLD AUTO: 74 % (ref 43–75)
NRBC BLD AUTO-RTO: 0 /100 WBCS
PLATELET # BLD AUTO: 219 THOUSANDS/UL (ref 149–390)
PMV BLD AUTO: 9 FL (ref 8.9–12.7)
PROTHROMBIN TIME: 14.9 SECONDS (ref 11.6–14.5)
RBC # BLD AUTO: 4.65 MILLION/UL (ref 3.88–5.62)
WBC # BLD AUTO: 10.77 THOUSAND/UL (ref 4.31–10.16)

## 2019-12-31 PROCEDURE — 85610 PROTHROMBIN TIME: CPT

## 2019-12-31 PROCEDURE — 85025 COMPLETE CBC W/AUTO DIFF WBC: CPT

## 2019-12-31 PROCEDURE — 36415 COLL VENOUS BLD VENIPUNCTURE: CPT

## 2019-12-31 NOTE — TELEPHONE ENCOUNTER
[facility] called, they are saying the patient was prescribed Lexapro 10mg yesterday 12/30/2019  Patient is also taking Planequil and the pharmicist says they interact  Interaction is QT intreval prolongation , leads to arrythmia   Please advise Jac Baldwin MA

## 2019-12-31 NOTE — TELEPHONE ENCOUNTER
Spoke w/ pt's daughter in law regarding Coumadin dosage  Instructions given as per anticoag calendar  Repeat CBC with mild anemia, improved from previous  CT is scheduled for later this week  Advised he is not a candidate for Eliquis or Xarelto as he has a mechanical valve    Kathrin Nazario

## 2019-12-31 NOTE — TELEPHONE ENCOUNTER
msg left on test result hotline  Devaughn Smith from [facility] radiology requesting an order for : BUN, CREATNIN and GFR   She wants it faxed over to [phone]  he has to have this done before his scheduled CT scan of chest with contrast on 01/02/20 at 7:30 am   Blayne López MA

## 2020-01-03 ENCOUNTER — TELEPHONE (OUTPATIENT)
Dept: FAMILY MEDICINE CLINIC | Facility: CLINIC | Age: 85
End: 2020-01-03

## 2020-01-03 DIAGNOSIS — J18.9 PNEUMONIA OF RIGHT LOWER LOBE DUE TO INFECTIOUS ORGANISM: Primary | ICD-10-CM

## 2020-01-03 RX ORDER — AMOXICILLIN AND CLAVULANATE POTASSIUM 875; 125 MG/1; MG/1
1 TABLET, FILM COATED ORAL EVERY 12 HOURS SCHEDULED
Qty: 20 TABLET | Refills: 0 | Status: SHIPPED | OUTPATIENT
Start: 2020-01-03 | End: 2020-01-13

## 2020-01-03 NOTE — TELEPHONE ENCOUNTER
Please mail order for CT chest to pt's daughter in law, [facility]  Address is [address], [place], [place] [address]  Thanks!

## 2020-01-03 NOTE — TELEPHONE ENCOUNTER
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

## 2020-01-06 LAB — INR PPP: 1.7 (ref 0.84–1.19)

## 2020-01-09 ENCOUNTER — TELEPHONE (OUTPATIENT)
Dept: FAMILY MEDICINE CLINIC | Facility: CLINIC | Age: 85
End: 2020-01-09

## 2020-01-09 DIAGNOSIS — I10 ESSENTIAL HYPERTENSION: Primary | ICD-10-CM

## 2020-01-09 NOTE — TELEPHONE ENCOUNTER
[facility] requesting a call back from [address] [facility] [facility], [place]

## 2020-01-09 NOTE — TELEPHONE ENCOUNTER
JUANY    Patient needs lab work done prior to his cat scan  She doesn't know what its called, but she said its for the contrast and him being diabetic?

## 2020-01-09 NOTE — TELEPHONE ENCOUNTER
Ordered BMP  Please let Bonny Leal know that this shouldn't be done until the week of his CT scan   Wolfgang Sweeney

## 2020-01-10 ENCOUNTER — ANTICOAG VISIT (OUTPATIENT)
Dept: FAMILY MEDICINE CLINIC | Facility: CLINIC | Age: 85
End: 2020-01-10

## 2020-01-10 DIAGNOSIS — I48.0 PAROXYSMAL ATRIAL FIBRILLATION (HCC): ICD-10-CM

## 2020-01-10 NOTE — TELEPHONE ENCOUNTER
Unfortunately, I can't see what they did with his Coumadin while he was in the hospital   Please ask Kiran Velazquez what dose he has been taking and document that in this telephone encounter  I would like him to have his INR checked Monday or Tuesday   Magaly Jacob

## 2020-01-10 NOTE — TELEPHONE ENCOUNTER
Collette Gonzalez,   Pt has been discharged from [facility], is home now       Please advise, so I can give correct information to URBANO pt's daughter-in-law  Thank you    Pdamini Downing MA

## 2020-01-10 NOTE — PROGRESS NOTES
also was told  To Ariel Lott that if he (patient) cannot make it to draw blood on Monday take 4 mg  that he can recheck on Tuesday    Alice Levine MA

## 2020-01-10 NOTE — TELEPHONE ENCOUNTER
Marisa was Updated  Spoke to angela and she stated that patient INR was 1 7 on Monday and the hospital had the patient take 4 mg on Mon & tues, 3 mg on weds, skipped Thursday  was informed to tell patient to take 4 mg on Friday 3 mg on Saturday, 4mg on Sunday and recheck on Monday  If patient cannot make it on Monday then ok to take 4mg and recheck Tuesday    Per Andrzej Urbina MA   sending to Savanna Lennon to ok message

## 2020-01-10 NOTE — TELEPHONE ENCOUNTER
Beck García aware to get bw done week of ct  She would like to know    Chasity Reed has pnuemonia and Beck García wants to know what he needs to do regarding his INR    Please leave detail message on her phone

## 2020-01-13 ENCOUNTER — APPOINTMENT (OUTPATIENT)
Dept: LAB | Facility: CLINIC | Age: 85
End: 2020-01-13
Payer: MEDICARE

## 2020-01-13 ENCOUNTER — ANTICOAG VISIT (OUTPATIENT)
Dept: FAMILY MEDICINE CLINIC | Facility: CLINIC | Age: 85
End: 2020-01-13

## 2020-01-13 DIAGNOSIS — I48.0 PAROXYSMAL ATRIAL FIBRILLATION (HCC): ICD-10-CM

## 2020-01-13 LAB
INR PPP: 2.9 (ref 0.84–1.19)
INR PPP: 2.9 (ref 0.84–1.19)
PROTHROMBIN TIME: 29.8 SECONDS (ref 11.6–14.5)

## 2020-01-13 PROCEDURE — 36415 COLL VENOUS BLD VENIPUNCTURE: CPT

## 2020-01-13 PROCEDURE — 85610 PROTHROMBIN TIME: CPT

## 2020-01-15 ENCOUNTER — TELEPHONE (OUTPATIENT)
Dept: FAMILY MEDICINE CLINIC | Facility: CLINIC | Age: 85
End: 2020-01-15

## 2020-01-15 NOTE — TELEPHONE ENCOUNTER
Spoke w/ Raphael Collins, he has not moved his bowels since he left the hospital   He denies abdominal pain, bloating, or n/v  Appetite has been decreased, but this has been more normal for him  Suggested Miralax and prune juice as well as pushing fluids  She is also concerned about his general state of health  She feels he is declining  He makes no effort to walk or get out of the chair  She feels like he is becoming weaker  Would like an order for physical therapy  She will check with the facility where he resides if they have PT on staff or if we can get PT services through [facility] and will then call me back so we can order as appropriate   Michael Valenzuela

## 2020-01-15 NOTE — TELEPHONE ENCOUNTER
Mikki Espinosa    Patients daughter called and stated her father is constipated  She said she would like to speak to you when you come in

## 2020-01-21 ENCOUNTER — TELEPHONE (OUTPATIENT)
Dept: FAMILY MEDICINE CLINIC | Facility: CLINIC | Age: 85
End: 2020-01-21

## 2020-01-21 NOTE — TELEPHONE ENCOUNTER
Can you order please    Marina Kemp, Arianna [facility] Patricia Caldwell

## 2020-01-22 ENCOUNTER — ANTICOAG VISIT (OUTPATIENT)
Dept: FAMILY MEDICINE CLINIC | Facility: CLINIC | Age: 85
End: 2020-01-22

## 2020-01-22 ENCOUNTER — APPOINTMENT (OUTPATIENT)
Dept: LAB | Facility: CLINIC | Age: 85
End: 2020-01-22
Payer: MEDICARE

## 2020-01-22 DIAGNOSIS — I10 ESSENTIAL HYPERTENSION: ICD-10-CM

## 2020-01-22 DIAGNOSIS — I48.0 PAROXYSMAL ATRIAL FIBRILLATION (HCC): ICD-10-CM

## 2020-01-22 LAB
ANION GAP SERPL CALCULATED.3IONS-SCNC: 3 MMOL/L (ref 4–13)
BUN SERPL-MCNC: 13 MG/DL (ref 5–25)
CALCIUM SERPL-MCNC: 8.8 MG/DL (ref 8.3–10.1)
CHLORIDE SERPL-SCNC: 104 MMOL/L (ref 100–108)
CO2 SERPL-SCNC: 33 MMOL/L (ref 21–32)
CREAT SERPL-MCNC: 0.5 MG/DL (ref 0.6–1.3)
GFR SERPL CREATININE-BSD FRML MDRD: 99 ML/MIN/1.73SQ M
GLUCOSE SERPL-MCNC: 78 MG/DL (ref 65–140)
INR PPP: 2.65 (ref 0.84–1.19)
POTASSIUM SERPL-SCNC: 3.8 MMOL/L (ref 3.5–5.3)
PROTHROMBIN TIME: 27.7 SECONDS (ref 11.6–14.5)
SODIUM SERPL-SCNC: 140 MMOL/L (ref 136–145)

## 2020-01-22 PROCEDURE — 36415 COLL VENOUS BLD VENIPUNCTURE: CPT

## 2020-01-22 PROCEDURE — 85610 PROTHROMBIN TIME: CPT

## 2020-01-22 PROCEDURE — 80048 BASIC METABOLIC PNL TOTAL CA: CPT

## 2020-01-22 NOTE — TELEPHONE ENCOUNTER
Spoke with Fay from [facility], made her aware that a BMP was ordered which includes a BUN/CREATININE  Lorie Franco stated it had already been drawn  Lorie Franco stated it was our mistake for not orderly properly  I spoke with Etha Lundborg , stated was ordered properly         Jeannie Ortiz MA

## 2020-01-22 NOTE — TELEPHONE ENCOUNTER
Fay from [facility] calling stating there is no BUN/CREATINE  I was under the impression BMP is same for testing? Can call them back  Mr Whitley Garcia was there this morning for INR and was told lab slip was not in chart for upcoming CT scan    Thank you     Also, call patient when he can go back to lab

## 2020-01-30 ENCOUNTER — OFFICE VISIT (OUTPATIENT)
Dept: PODIATRY | Facility: CLINIC | Age: 85
End: 2020-01-30
Payer: MEDICARE

## 2020-01-30 VITALS
WEIGHT: 134 LBS | HEART RATE: 78 BPM | DIASTOLIC BLOOD PRESSURE: 69 MMHG | SYSTOLIC BLOOD PRESSURE: 135 MMHG | RESPIRATION RATE: 17 BRPM | HEIGHT: 69 IN | BODY MASS INDEX: 19.85 KG/M2

## 2020-01-30 DIAGNOSIS — M79.672 PAIN IN BOTH FEET: ICD-10-CM

## 2020-01-30 DIAGNOSIS — R60.9 CHRONIC EDEMA: Primary | ICD-10-CM

## 2020-01-30 DIAGNOSIS — I70.209 PERIPHERAL ARTERIOSCLEROSIS (HCC): ICD-10-CM

## 2020-01-30 DIAGNOSIS — E11.42 DIABETIC POLYNEUROPATHY ASSOCIATED WITH TYPE 2 DIABETES MELLITUS (HCC): ICD-10-CM

## 2020-01-30 DIAGNOSIS — B35.1 ONYCHOMYCOSIS: ICD-10-CM

## 2020-01-30 DIAGNOSIS — M79.671 PAIN IN BOTH FEET: ICD-10-CM

## 2020-01-30 PROCEDURE — 11721 DEBRIDE NAIL 6 OR MORE: CPT | Performed by: PODIATRIST

## 2020-01-30 PROCEDURE — 99212 OFFICE O/P EST SF 10 MIN: CPT | Performed by: PODIATRIST

## 2020-01-30 NOTE — PROGRESS NOTES
Assessment/Plan:  Pain upon ambulation   Peripheral artery disease   Diabetic neuropathy   Mycosis of nail  Chronic edema  Rule out deep venous insufficiency      Plan   Diabetic foot exam performed   All mycotic nails debrided without pain or complication      Venous Doppler testing to be ordered                There are no diagnoses linked to this encounter        Subjective:  patient complains of pain in his toes and feet with ambulation   No history of trauma             Past Medical History:   Diagnosis Date    CAD (coronary artery disease)      [facility]      Dupuytren contracture      Dyslipidemia      Essential hypertension      ICD (implantable cardioverter-defibrillator) in place      Lumbosacral radiculopathy at S1      Osteoarthritis      Rheumatoid arthritis (HCC)      SNHL (sensorineural hearing loss)      Spinal stenosis      Type 2 diabetes mellitus ([facility] )                     Past Surgical History:   Procedure Laterality Date    AORTIC VALVE REPLACEMENT        CHOLECYSTECTOMY             No Known Allergies        Current Outpatient Prescriptions:     albuterol (PROVENTIL HFA,VENTOLIN HFA) 90 mcg/act inhaler, Inhale 2 puffs, Disp: , Rfl:     amLODIPine (NORVASC) 10 mg tablet, take 1/2  tablet daily, Disp: , Rfl:     benazepril (LOTENSIN) 5 mg tablet, Take 5 mg by mouth, Disp: , Rfl:     fluticasone (FLONASE) 50 mcg/act nasal spray, 1 spray into each nostril daily, Disp: , Rfl:     folic acid (FOLVITE) 1 mg tablet, Take 1 mg by mouth, Disp: , Rfl:     gabapentin (NEURONTIN) 100 mg capsule, Take 100 mg by mouth, Disp: , Rfl:     glucose blood (ONE TOUCH ULTRA TEST) test strip, Check sugar daily and as needed  Dx: E11 9, Disp: , Rfl:     glucose blood test strip, Check sugar daily and as needed  Dx: E11 9, Disp: , Rfl:     hydroxychloroquine (PLAQUENIL) 200 mg tablet, Take 1 tablet (200 mg total) by mouth 2 (two) times a day with meals for 30 days, Disp: 60 tablet, Rfl: 5    Lancet Devices (ONE TOUCH DELICA LANCING DEV) MISC, Check sugar daily and as needed  Dx: E11 9, Disp: , Rfl:     methotrexate (RHEUMATREX) 2 5 MG tablet, Take 4 tablets (10 mg total) by mouth once a week, Disp: 16 tablet, Rfl: 2    metoprolol succinate (TOPROL XL) 50 mg 24 hr tablet, Take 50 mg by mouth, Disp: , Rfl:     ONETOUCH DELICA LANCETS 75T MISC, Check sugar daily and as needed  Dx: E11 9, Disp: , Rfl:     potassium chloride (K-DUR,KLOR-CON) 10 mEq tablet, Take 10 mEq by mouth, Disp: , Rfl:     PredniSONE 5 MG TBEC, Take 1 tablet (5 mg total) by mouth daily, Disp: 30 tablet, Rfl: 5    rosuvastatin (CRESTOR) 10 MG tablet, 1 by mouth daily, Disp: , Rfl:     warfarin (COUMADIN) 2 mg tablet, Take 2 mg on Mondays and Fridays and 4 mg all other days of the week or as directed, Disp: , Rfl:            Patient Active Problem List   Diagnosis    Patient refuses to disclose advance directive information    Bilateral sensorineural hearing loss    Biventricular ICD (implantable cardioverter-defibrillator) in place    [facility]    CHB (complete heart block) ([facility]    Coronary artery disease involving native coronary artery of native heart without angina pectoris    Dupuytren contracture    Dyslipidemia, goal LDL below 70    Hyperplasia of prostate with lower urinary tract symptoms (LUTS)    Former smoker    Essential hypertension    Lymphedema of left leg    Osteoarthritis of multiple joints    Pacemaker    Paroxysmal atrial fibrillation (HCC)    RA (rheumatoid arthritis) ([address] )    Restrictive lung disease    RBBB    S/P AVR (aortic valve replacement)    S/P primary angioplasty with coronary stent    Spinal stenosis of lumbar region without neurogenic claudication    Type 2 diabetes mellitus with hemoglobin A1c goal of less than 7 0% (HCC)    Abnormal TSH             Patient ID: Nitin Burgos is a 80 y  o  male      HPI     The following portions of the patient's history were reviewed and updated as appropriate: allergies, current medications, past family history, past medical history, past social history, past surgical history and problem list      Review of Systems       Objective:  Patient's shoes and socks removed  Patient demonstrates 3/4 pitting edema bilateral   Negative Homans sign        Foot Exam     Right Foot/Ankle      Inspection and Palpation  Skin Exam: dry skin;      Neurovascular  Dorsalis pedis: 1+  Posterior tibial: 1+        Left Foot/Ankle       Inspection and Palpation  Skin Exam: dry skin;      Neurovascular  Dorsalis pedis: 1+  Posterior tibial: 1+        Physical Exam   Cardiovascular: Pulses are weak pulses  Pulses:       Dorsalis pedis pulses are 1+ on the right side, and 1+ on the left side         Posterior tibial pulses are 1+ on the right side, and 1+ on the left side  Feet:   Right Foot:   Skin Integrity: Positive for warmth and dry skin  Left Foot:   Skin Integrity: Positive for warmth and dry skin  Patient's shoes and socks removed  Right Foot/Ankle   Right Foot Inspection  Skin Exam: dry skin and warmth               Toe Exam: swelling and erythema  Sensory   Vibration: diminished  Proprioception: diminished   Monofilament testing: intact  Vascular  Capillary refills: elevated  The right DP pulse is 1+  The right PT pulse is 1+       Left Foot/Ankle  Left Foot Inspection  Skin Exam: dry skin and warmth                 Sensory   Vibration: diminished  Proprioception: diminished  Monofilament: intact  Vascular  Capillary refills: elevated  The left DP pulse is 1+  The left PT pulse is 1+       Patient's shoes and socks removed  Right Foot/Ankle   Right Foot Inspection  Skin Exam: dry skin                           Left Foot/Ankle  Left Foot Inspection  Skin Exam: dry skin    Patient's shoes and socks removed  Assign Risk Category:  Deformity present;  Loss of protective sensation; Weak pulses       Risk: 2

## 2020-02-05 ENCOUNTER — ANTICOAG VISIT (OUTPATIENT)
Dept: FAMILY MEDICINE CLINIC | Facility: CLINIC | Age: 85
End: 2020-02-05

## 2020-02-05 ENCOUNTER — APPOINTMENT (OUTPATIENT)
Dept: LAB | Facility: CLINIC | Age: 85
End: 2020-02-05
Payer: MEDICARE

## 2020-02-05 ENCOUNTER — TELEPHONE (OUTPATIENT)
Dept: FAMILY MEDICINE CLINIC | Facility: CLINIC | Age: 85
End: 2020-02-05

## 2020-02-05 DIAGNOSIS — I48.0 PAROXYSMAL ATRIAL FIBRILLATION (HCC): ICD-10-CM

## 2020-02-05 LAB
INR PPP: 2.22 (ref 0.84–1.19)
PROTHROMBIN TIME: 24.1 SECONDS (ref 11.6–14.5)

## 2020-02-05 PROCEDURE — 36415 COLL VENOUS BLD VENIPUNCTURE: CPT

## 2020-02-05 PROCEDURE — 85610 PROTHROMBIN TIME: CPT

## 2020-02-05 NOTE — TELEPHONE ENCOUNTER
----- Message from Cullen Jha sent at 2/5/2020  1:25 PM EST -----  Continue same dose, recheck 2 weeks   Cullen Jha

## 2020-02-07 ENCOUNTER — TELEPHONE (OUTPATIENT)
Dept: FAMILY MEDICINE CLINIC | Facility: CLINIC | Age: 85
End: 2020-02-07

## 2020-02-07 NOTE — TELEPHONE ENCOUNTER
Panchito Snider, pt's daughter looking for results from imaging  Spoke with [facility]  Imaging [phone]    Will be faxing over pt's f/u chest x-ray   Please let Savannah Pinzon know when comes through         Luzmaria Perry MA

## 2020-02-07 NOTE — PROGRESS NOTES
Assessment and Plan:   Mr Tanis Cockayne an 80-year-old  male with history significant for seropositive rheumatoid arthritis (positive rheumatoid factor and CCP antibody), left knee osteoarthritis, generalized osteoarthritis and osteoporosis who presents for follow-up  He is currently on hydroxychloroquine 200 mg twice daily and has not been compliant with the methotrexate 15 mg once weekly      # Seropositive rheumatoid arthritis, currently with severe activity, with evidence of chronic synovial hypertrophy and joint deformities  -Dany Franco is seen today in follow-up for the seropositive rheumatoid arthritis which is currently managed with hydroxychloroquine 200 mg twice daily  I have counseled him on the importance of remaining compliant with the methotrexate, as this has previously benefitted the arthritis significantly  He is willing to restart the methotrexate at 6 tablets once weekly and continue the folic acid 1 mg daily  He is up-to-date with high risk medication lab monitoring and this will be repeated at the follow-up visit  At the follow-up visit if it is determined that he is still having difficulty with taking the methotrexate I may discuss initiating an infusion regimen for the rheumatoid arthritis as this would prompt him to remain more compliant and we can monitor him more closely  - To help with the significant inflammation he is presenting with, I have prescribed him prednisone 20 mg once daily for 7 days  - I requested he also follow up for annual eye exams for the hydroxychloroquine monitoring and provided him with a reminder in the after visit summary      # Osteoporosis  - He is due for the Prolia injection today, but as this is not exactly at the six-month oseas, we will postpone it to next week    He will schedule an appointment to come in for this next Monday   - I advised him to continue the calcium and vitamin-D supplements daily      # Left knee osteoarthritis  - He is most symptomatic at his left knee, which appears to be secondary to advanced degenerative arthritis   There is a large left knee effusion present, and I offered an arthrocentesis as well as intra-articular cortisone injection for symptomatic relief but he declines at this time   I advised him to call the office if he is interested in pursuing this option  Florencia Durham would like to hold off on a referral to Orthopedics as well for consideration of a total knee replacement surgery  Plan:  Diagnoses and all orders for this visit:    Seropositive rheumatoid arthritis ([address] )  -     methotrexate 2 5 mg tablet; Take 6 tablets (15 mg total) by mouth once a week  -     hydroxychloroquine (PLAQUENIL) 200 mg tablet; Take 1 tablet (200 mg total) by mouth 2 (two) times a day with meals  -     predniSONE 20 mg tablet; Take 1 tablet (20 mg total) by mouth daily    Primary generalized (osteo)arthritis    Long-term use of Plaquenil    Age-related osteoporosis without current pathological fracture    High risk medication use    Primary osteoarthritis of left knee      Activities as tolerated    Diet: low carb/low fat, more greens/vegetables, adequate hydration  Exercise: try to maintain a low impact exercise regimen as much as possible  Walk for 30 minutes a day for at least 3 days a week    Encouraged to maintain good sleep hygiene  Continue other medications as prescribed by PCP and other specialists        RTC in 1 week for Prolia and routine follow up in 3 months  HPI    INITIAL VISIT NOTE:  Mr Ciro Gomez is an 70-year-old male with history significant for seropositive rheumatoid arthritis (positive RF and CCP) who presents for initial evaluation and establishing with Rheumatology       Patient and his wife are present at today's visit and although they are both very coherent, there appears to be a slight lapse in recalling patients prior history   History today is obtained from patient, his wife, and also from review of his prior rheumatology records      He was diagnosed with seropositive rheumatoid arthritis about 15 years ago when he developed diffuse joint pains  He was first seen by Dr Jennifer Parson (private practice rheumatologist) and was started on Hydroxychloroquine 400 mg daily along with low dose steroids, and it appears he was maintained on that regimen till about 2016, when he discontinued the medications as his symptoms had resolved  He remained mostly asymptomatic until earlier this year when he again experienced diffuse joint pains and swelling, but predominantly in his hands, wrists, shoulders and knees  Per the notes from his most recent rheumatologist, Dr Le Harris from [facility], patient was restarted on Plaquenil and low dose prednisone, but patient is unaware of this and says he was not taking the medications  There was also conversation regarding step-up therapy to TNF-inhibitors, but unfortunately due to the cost Humira or Enbrel were never started  Overall, as per patient he is currently not on any medications for the rheumatoid arthritis      Currently, he states continued aching pain in his joints, mostly affecting his bilateral shoulders, left wrist, bilateral hands and left knee  He has swelling of his fingers, left wrist and knee as well  Morning stiffness lasts a few hours and slightly eases up but does not resolve completely  He denies fevers, sicca symptoms, mouth/nose ulcers, swollen glands, skin rash, abdominal pain, N/V/D or blood in stools  He does not get recurrent infections   A quantiferon checked in 7/2018 was negative          11/13/2018:  Patient is seen for follow-up today in view of seropositive rheumatoid arthritis with chronic deformities   He is currently on methotrexate 4 tablets weekly with folic acid 1 mg daily, hydroxychloroquine 200 mg b i d  and prednisone 5 mg daily      This combination of medications was started following his initial visit with me on 09/11, and he states there has been a significant improvement in his overall joint pains since starting this regimen   He noticed the improvement in the first few days, and states he only experiences occasional pain in his left hand 3rd and 4th digits, but otherwise denies any joint pains at this time   A few days ago he did have a twisting injury of his left knee which resulted in an occasional pain, but that is also improving   He does have chronic numbness down his left lower extremity   He denies any acutely swollen joints and states morning stiffness has also improved   He is otherwise tolerating the medications well without any noticeable side effects such as fevers, chills, recurrent infections, mouth or nose ulcers or GI upset   Of note he does give a history of a chronic dry cough for the past 1 year which is associated with a runny nose, and this has previously been attributed to allergies   He has not had any recent chest x-rays done  Ann Randhawa has chronic shortness of breath on exertion which is unchanged   No chest pain   No other complaints at this time         12/11/2018:  Patient presents for follow-up today to discuss results of his bone density exam      He had a recent DEXA scan done on the 29th of November which showed osteoporosis and a T-score of -2 6 in the left hip  Valeria Lorenzo is a 10 year risk of hip fracture being 11%, with a 10 year risk of major osteoporotic fracture being 20%   Patient denies any history of recent obvious fractures   He is willing to start medications for the osteoporosis   He is currently not taking calcium or vitamin-D supplements      In terms of the rheumatoid arthritis he has been doing well on a combination of methotrexate 4 tablets weekly, with hydroxychloroquine 200 mg b i d  and prednisone 5 mg daily   He denies any significant joint pains or swelling at today's visit  Leah Fry did also review his labs following the prior visit which revealed improvement of his inflammatory markers, as well as an unremarkable CBC and CMP       He does report a head injury he sustained earlier today, and has a bruise at that site  Leopoldo Badillo denies any headaches, dizziness or changes in his vision   No other acute complaints at today's visit         3/12/2019:  Patient presents for follow-up of seropositive rheumatoid arthritis  Leopoldo Badillo is currently on hydroxychloroquine 200 mg twice daily  Leopoldo Badillo states since the prior office visit he has discontinued the methotrexate and prednisone, but cannot recall when this was done   We had not discussed discontinuing these medications      He states overall his joint pains have remained stable, but unfortunately 1 and half weeks ago he accidentally temporarily discontinued the Plaquenil and did notice a slight flare-up in joint pains  Leopoldo Badillo has since restarted the hydroxychloroquine and except for ongoing pain in his left knee, denies any significant flare-up in joint pains   He has not noticed any swollen joints   He does continue to experience morning stiffness which lasts less than 1 hour, but does report gelling phenomenon   In view of the left knee pain he was seen by Orthopedics (Dr Jiang Samples offered intra-articular cortisone injections or Euflexxa, but patient would like to hold off for now  Leopoldo Badillo may also consider a knee replacement in the future, but would like to hold off on seeing Orthopedic surgery for now      No other complaints noted at this time         5/7/2019:  Patient presents for follow-up of seropositive rheumatoid arthritis  Leopoldo Badillo is currently not on any medications   Unfortunately his wife did pass away a few days ago, and he discontinued all of his medications at that time, excluding the Coumadin  Leopoldo Badillo states he has been coping well and does have the support of family nearby  Leopoldo Badillo has had a loss of appetite and has lost a significant amount of weight      In terms of the joint pains, he has noticed a recurrence of pain affecting his hands, and has chronic pain affecting his left knee   He is not interested in pursuing an Orthopedics referral for consideration of a total knee replacement at this time  Love Lan does have occasional pain affecting his elbows and shoulders as well  Love Lan has noticed swelling of the left knee   He experiences morning stiffness which affects him diffusely and lasts about 30 minutes  Love Lan is not taking any over-the-counter pain medications   No other complaints noted at this time          8/8/2019:  Patient presents for follow-up of seropositive rheumatoid arthritis  Love Lan is currently on hydroxychloroquine 200 mg twice daily      There has been no significant change in his joint pains noted with the hydroxychloroquine, although he admits to intermittently being noncompliant and forgetful in taking his medications  Love Lan continues to experience pain most prominently at his right shoulder and left knee   He states his hands and wrists do not really bother him in terms of pain, but he does have difficulty with gripping items and with strength   He has noticed swelling affecting his hands and left knee   He experiences morning stiffness which affects him diffusely and lasts a few minutes   He would like to continue holding off on seeing Orthopedics for the left knee osteoarthritis      He reports overall he has been doing well after the passing of his wife, and has been busy with his family and friends      Of note, he is due for his 2nd Prolia injection today, but unfortunately as we do not have any in stock he will reschedule an appointment for this   He tolerated the 1st injection well without any concerns for side effects         11/8/2019:  Patient presents for a follow-up of seropositive rheumatoid arthritis  He is currently on hydroxychloroquine 200 mg twice daily    He mentions he took the methotrexate at 6 tablets weekly for a total of 6 weeks, but then discontinued it as he thought he was out of refills      He mentions currently he only experiences pain in his hands when he makes a tight fist   He will also occasionally experience pain in his wrists, left elbow, left shoulder and bilateral knees  He has noticed swelling affecting his hands and a golf ball sized swelling at his left elbow  He does experience morning stiffness which affects him diffusely but improves with activities        2/10/2020:  Patient presents for a follow-up of seropositive rheumatoid arthritis  He is currently on hydroxychloroquine 200 mg twice daily  He has not been compliant with the methotrexate, and only restarted this last week  I reviewed his recently done CBC and CMP which were unremarkable  He reports since the last office visit he has continued to have increasing joint pains especially affecting his hands, shoulders and knees  He has noticed swelling affecting his hands, wrists and left knee  He does experience morning stiffness which affects him diffusely but improves with activities  He is currently not taking any over-the-counter pain medications to help with his joint pains  He is unsure if he is up-to-date with annual eye exams for the hydroxychloroquine monitoring  The following portions of the patient's history were reviewed and updated as appropriate: allergies, current medications, past family history, past medical history, past social history, past surgical history and problem list       Review of Systems  Constitutional: Negative for weight change, fevers, chills, night sweats  Positive for fatigue  ENT/Mouth: Negative for hearing changes, ear pain, sinus pain, hoarseness, sore throat, rhinorrhea, swallowing difficulty  Positive for nasal congestion  Eyes: Negative for pain, redness, discharge, vision changes  Cardiovascular: Negative for chest pain, palpitations  Respiratory: Negative for sputum, wheezing  Positive for cough and shortness of breath  Gastrointestinal: Negative for nausea, vomiting, diarrhea, constipation, pain, heartburn    Genitourinary: Negative for dysuria, urinary frequency, hematuria  Musculoskeletal: As per HPI  Skin: Negative for skin rash, color changes  Neuro: Negative for numbness, tingling, loss of consciousness  Positive for weakness  Psych: Negative for anxiety, depression  Heme/Lymph: Negative for easy bruising, bleeding, lymphadenopathy          Past Medical History:   Diagnosis Date    CAD (coronary artery disease)     Cardiomyopathy ([facility] )     Dupuytren contracture     Dyslipidemia     Essential hypertension     ICD (implantable cardioverter-defibrillator) in place     Lumbosacral radiculopathy at S1     Osteoarthritis     Rheumatoid arthritis (HCC)     SNHL (sensorineural hearing loss)     Spinal stenosis     Type 2 diabetes mellitus ([facility] )        Past Surgical History:   Procedure Laterality Date    AORTIC VALVE REPLACEMENT      CHOLECYSTECTOMY         Social History     Socioeconomic History    Marital status: /Civil Union     Spouse name: Not on file    Number of children: Not on file    Years of education: Not on file    Highest education level: Not on file   Occupational History    Not on file   Social Needs    Financial resource strain: Not on file    Food insecurity:     Worry: Not on file     Inability: Not on file    Transportation needs:     Medical: Not on file     Non-medical: Not on file   Tobacco Use    Smoking status: Former Smoker     Last attempt to quit:      Years since quittin 1    Smokeless tobacco: Never Used   Substance and Sexual Activity    Alcohol use: No    Drug use: No    Sexual activity: Not on file   Lifestyle    Physical activity:     Days per week: Not on file     Minutes per session: Not on file    Stress: Not on file   Relationships    Social connections:     Talks on phone: Not on file     Gets together: Not on file     Attends [employer] service: Not on file     Active member of club or organization: Not on file     Attends meetings of clubs or organizations: Not on file     Relationship status: Not on file    Intimate partner violence:     Fear of current or ex partner: Not on file     Emotionally abused: Not on file     Physically abused: Not on file     Forced sexual activity: Not on file   Other Topics Concern    Not on file   Social History Narrative    Not on file       Family History   Problem Relation Age of Onset    Heart disease Brother     Cancer Brother     COPD Father     Diabetes Mother        No Known Allergies      Current Outpatient Medications:     albuterol (PROVENTIL HFA,VENTOLIN HFA) 90 mcg/act inhaler, Inhale 2 puffs, Disp: , Rfl:     methotrexate 2 5 mg tablet, Take 6 tablets (15 mg total) by mouth once a week, Disp: 24 tablet, Rfl: 2    potassium chloride (K-DUR,KLOR-CON) 10 mEq tablet, Take 10 mEq by mouth, Disp: , Rfl:     amLODIPine (NORVASC) 10 mg tablet, Take 0 5 tablets (5 mg total) by mouth daily, Disp: 45 tablet, Rfl: 1    aspirin 81 MG tablet, Take 81 mg by mouth every morning, Disp: , Rfl:     benazepril-hydrochlorthiazide (LOTENSIN HCT) 20-12 5 MG per tablet, Take by mouth, Disp: , Rfl:     calcium carbonate-vitamin D (OSCAL-D) 500 mg-200 units per tablet, Take 1 tablet by mouth 2 (two) times a day with meals (Patient not taking: Reported on 9/30/2019), Disp: 60 tablet, Rfl: 11    cefuroxime (CEFTIN) 500 mg tablet, , Disp: , Rfl:     escitalopram (LEXAPRO) 10 mg tablet, Take 1 tablet (10 mg total) by mouth daily, Disp: 90 tablet, Rfl: 1    fluticasone (FLONASE) 50 mcg/act nasal spray, 2 sprays into each nostril daily, Disp: 16 g, Rfl: 1    folic acid (FOLVITE) 1 mg tablet, Take 1 tablet (1 mg total) by mouth daily, Disp: 90 tablet, Rfl: 1    gabapentin (NEURONTIN) 100 mg capsule, Take 1 capsule (100 mg total) by mouth 2 (two) times a day, Disp: 180 capsule, Rfl: 1    hydroxychloroquine (PLAQUENIL) 200 mg tablet, Take 1 tablet (200 mg total) by mouth 2 (two) times a day with meals, Disp: 180 tablet, Rfl: 1   methotrexate (RHEUMATREX) 2 5 MG tablet, Take 6 tablets (15 mg total) by mouth once a week, Disp: 24 tablet, Rfl: 2    metoprolol succinate (TOPROL XL) 50 mg 24 hr tablet, Take 1 tablet (50 mg total) by mouth 2 (two) times a day, Disp: 180 tablet, Rfl: 1    nitroglycerin (NITROSTAT) 0 4 mg SL tablet, Place under the tongue, Disp: , Rfl:     OS-ROD CALCIUM + D3 500-200 MG-UNIT TABS, , Disp: , Rfl: 1    predniSONE 20 mg tablet, Take 1 tablet (20 mg total) by mouth daily, Disp: 7 tablet, Rfl: 0    warfarin (COUMADIN) 1 mg tablet, 1/2 tab daily with other coumadin pill, and or as directed, Disp: 90 tablet, Rfl: 1    warfarin (COUMADIN) 2 mg tablet, 2 mg alternating with 3 mg daily or as directed, Disp: 90 tablet, Rfl: 1    warfarin (COUMADIN) 3 mg tablet, Take 1 tablet (3 mg total) by mouth daily OR AS DIRECTED, Disp: 90 tablet, Rfl: 3    warfarin (COUMADIN) 4 mg tablet, Take 1 tablet (4 mg total) by mouth daily, Disp: 90 tablet, Rfl: 3      Objective:    Vitals:    02/10/20 1140   BP: 116/64   BP Location: Right arm   Patient Position: Sitting   Cuff Size: Adult   Pulse: 68   Weight: 62 9 kg (138 lb 9 6 oz)   Height: 5' 9" (1 753 m)       Physical Exam  General: Well appearing, well nourished, in no distress  Oriented x 3, normal mood and affect  Ambulating with aid of a cane  Skin: Good turgor, no rash, unusual bruising or prominent lesions  Nails: Normal color, no deformities  HEENT:  Head: Normocephalic, atraumatic  Eyes: Conjunctiva clear, sclera non-icteric, EOM intact  Nose: No external lesions, mucosa non-inflamed  Mouth: Mucous membranes moist, no mucosal lesions  Neck: Supple  Extremities: No amputations or deformities, cyanosis, edema  Musculoskeletal:   Hands - he has significant soft tissue swelling present at his left hand 4th PIP joint, as well as additional soft tissue swelling present throughout his bilateral PIP joints, and to a lesser degree of his left hand MCPs    He is tender to palpation of various PIP and MCP joints bilaterally  There are also osteoarthritic changes present at his DIP joints bilaterally  The right 5th digit has a fixed boutonniere deformity  There is slight ulnar deviation noted of his right hand  Wrists - he has chronic synovial hypertrophy changes of his bilateral wrists, would likely soft tissue swelling noted but without significant tenderness  Elbows - chronic synovial hypertrophy noted more significantly at his right elbow  He has limitation in full extension of his right elbow  There is no tenderness noted  The left elbow bursal swelling has resolved  Shoulders - nontender without any soft tissue swelling, but he has limited active abduction till 90° of his right shoulder  Knees - there is a large left knee effusion present without warmth, erythema or significant tenderness  He has crepitus present bilaterally with bony enlargement noted  Ankles and feet - nontender with negative MTP squeeze test bilaterally  Neurologic: Alert and oriented  No focal neurological deficits appreciated  Psychiatric: Normal mood and affect  NAVYA Salguero    Rheumatology

## 2020-02-10 ENCOUNTER — OFFICE VISIT (OUTPATIENT)
Dept: RHEUMATOLOGY | Facility: CLINIC | Age: 85
End: 2020-02-10
Payer: MEDICARE

## 2020-02-10 VITALS
HEIGHT: 69 IN | WEIGHT: 138.6 LBS | SYSTOLIC BLOOD PRESSURE: 116 MMHG | HEART RATE: 68 BPM | DIASTOLIC BLOOD PRESSURE: 64 MMHG | BODY MASS INDEX: 20.53 KG/M2

## 2020-02-10 DIAGNOSIS — M81.0 AGE-RELATED OSTEOPOROSIS WITHOUT CURRENT PATHOLOGICAL FRACTURE: ICD-10-CM

## 2020-02-10 DIAGNOSIS — Z79.899 HIGH RISK MEDICATION USE: ICD-10-CM

## 2020-02-10 DIAGNOSIS — M15.0 PRIMARY GENERALIZED (OSTEO)ARTHRITIS: ICD-10-CM

## 2020-02-10 DIAGNOSIS — Z79.899 LONG-TERM USE OF PLAQUENIL: ICD-10-CM

## 2020-02-10 DIAGNOSIS — M17.12 PRIMARY OSTEOARTHRITIS OF LEFT KNEE: ICD-10-CM

## 2020-02-10 DIAGNOSIS — M05.9 SEROPOSITIVE RHEUMATOID ARTHRITIS (HCC): Primary | ICD-10-CM

## 2020-02-10 PROCEDURE — 99214 OFFICE O/P EST MOD 30 MIN: CPT | Performed by: INTERNAL MEDICINE

## 2020-02-10 PROCEDURE — 1160F RVW MEDS BY RX/DR IN RCRD: CPT | Performed by: INTERNAL MEDICINE

## 2020-02-10 PROCEDURE — 1036F TOBACCO NON-USER: CPT | Performed by: INTERNAL MEDICINE

## 2020-02-10 PROCEDURE — 4010F ACE/ARB THERAPY RXD/TAKEN: CPT | Performed by: INTERNAL MEDICINE

## 2020-02-10 PROCEDURE — 3008F BODY MASS INDEX DOCD: CPT | Performed by: INTERNAL MEDICINE

## 2020-02-10 PROCEDURE — 3078F DIAST BP <80 MM HG: CPT | Performed by: INTERNAL MEDICINE

## 2020-02-10 PROCEDURE — 4040F PNEUMOC VAC/ADMIN/RCVD: CPT | Performed by: INTERNAL MEDICINE

## 2020-02-10 PROCEDURE — 3074F SYST BP LT 130 MM HG: CPT | Performed by: INTERNAL MEDICINE

## 2020-02-10 RX ORDER — BENAZEPRIL HYDROCHLORIDE AND HYDROCHLOROTHIAZIDE 20; 12.5 MG/1; MG/1
TABLET ORAL
COMMUNITY

## 2020-02-10 RX ORDER — HYDROXYCHLOROQUINE SULFATE 200 MG/1
200 TABLET, FILM COATED ORAL 2 TIMES DAILY WITH MEALS
Qty: 180 TABLET | Refills: 1 | Status: SHIPPED | OUTPATIENT
Start: 2020-02-10 | End: 2020-08-08

## 2020-02-10 RX ORDER — POTASSIUM CHLORIDE 750 MG/1
10 TABLET, EXTENDED RELEASE ORAL
COMMUNITY
Start: 2018-03-01

## 2020-02-10 RX ORDER — NITROGLYCERIN 0.4 MG/1
TABLET SUBLINGUAL
COMMUNITY

## 2020-02-10 RX ORDER — CEFUROXIME AXETIL 500 MG/1
TABLET ORAL
COMMUNITY
Start: 2020-01-06

## 2020-02-10 RX ORDER — PREDNISONE 20 MG/1
20 TABLET ORAL DAILY
Qty: 7 TABLET | Refills: 0 | Status: SHIPPED | OUTPATIENT
Start: 2020-02-10

## 2020-02-10 RX ORDER — ALBUTEROL SULFATE 90 UG/1
2 AEROSOL, METERED RESPIRATORY (INHALATION)
COMMUNITY
Start: 2015-12-18

## 2020-02-17 ENCOUNTER — OFFICE VISIT (OUTPATIENT)
Dept: RHEUMATOLOGY | Facility: CLINIC | Age: 85
End: 2020-02-17
Payer: MEDICARE

## 2020-02-17 DIAGNOSIS — M81.0 AGE-RELATED OSTEOPOROSIS WITHOUT CURRENT PATHOLOGICAL FRACTURE: Primary | ICD-10-CM

## 2020-02-17 PROCEDURE — 4040F PNEUMOC VAC/ADMIN/RCVD: CPT

## 2020-02-17 PROCEDURE — 1160F RVW MEDS BY RX/DR IN RCRD: CPT

## 2020-02-17 PROCEDURE — 3078F DIAST BP <80 MM HG: CPT

## 2020-02-17 PROCEDURE — 3074F SYST BP LT 130 MM HG: CPT

## 2020-02-17 PROCEDURE — 96372 THER/PROPH/DIAG INJ SC/IM: CPT

## 2020-02-17 NOTE — PROGRESS NOTES
Pt received his Prolia 60mg injection today  I gave it to him in his left arm SQ and he tolerated it well   He will return in 6 months for his next one

## 2020-02-21 ENCOUNTER — TELEPHONE (OUTPATIENT)
Dept: FAMILY MEDICINE CLINIC | Facility: CLINIC | Age: 85
End: 2020-02-21

## 2020-02-21 NOTE — TELEPHONE ENCOUNTER
Reviewed report and Dr Truong Bunn note  I would recommend he use compression stockings, that he can  at any pharmacy   Pamalee Greenhouse

## 2020-02-21 NOTE — TELEPHONE ENCOUNTER
Kyra Luque, daughter in law of patient called and stated that patient went for Ultrasound  And Dr Nix  office told Andrew Abraham to call us and let us know "he is retaining water"    [address] [facility] result is in Epic    Please call Andrew Abraham    [phone]

## 2020-02-24 DIAGNOSIS — I48.0 PAROXYSMAL ATRIAL FIBRILLATION (HCC): ICD-10-CM

## 2020-02-24 RX ORDER — WARFARIN SODIUM 4 MG/1
4 TABLET ORAL
Qty: 90 TABLET | Refills: 3 | Status: SHIPPED | OUTPATIENT
Start: 2020-02-24 | End: 2020-02-26 | Stop reason: SDUPTHER

## 2020-02-24 NOTE — TELEPHONE ENCOUNTER
I would need to see him to evaluate the swelling before prescribing a water pill  If it is mild, compression stockings are fine  She can certainly bring him in at any time    Magaly Jacob

## 2020-02-24 NOTE — TELEPHONE ENCOUNTER
Salas Clinton was informed she stated she will call back to schedule   She stated that she will need to ask him first   Flaquita Grimaldo MA  No further action

## 2020-02-24 NOTE — TELEPHONE ENCOUNTER
Spoke to [place] and informed her of Marcellusgusjose roberto Herrera note she asked if he should be on a water pill I stated that she should ask his cardiologist about that and told her that he has an appt with them in march so she should reach out if she wants patient to be seen sooner  I stated to angela to have patient  definitely try the compression stockings  Sending to [facility]    What are your suggestion on the water pill?    Andressa Cunha MA

## 2020-02-26 ENCOUNTER — APPOINTMENT (OUTPATIENT)
Dept: LAB | Facility: CLINIC | Age: 85
End: 2020-02-26
Payer: MEDICARE

## 2020-02-26 ENCOUNTER — ANTICOAG VISIT (OUTPATIENT)
Dept: FAMILY MEDICINE CLINIC | Facility: CLINIC | Age: 85
End: 2020-02-26

## 2020-02-26 DIAGNOSIS — I48.0 PAROXYSMAL ATRIAL FIBRILLATION (HCC): ICD-10-CM

## 2020-02-26 DIAGNOSIS — J30.1 SEASONAL ALLERGIC RHINITIS DUE TO POLLEN: ICD-10-CM

## 2020-02-26 LAB
INR PPP: 1.88 (ref 0.84–1.19)
PROTHROMBIN TIME: 21.1 SECONDS (ref 11.6–14.5)

## 2020-02-26 PROCEDURE — 85610 PROTHROMBIN TIME: CPT

## 2020-02-26 PROCEDURE — 36415 COLL VENOUS BLD VENIPUNCTURE: CPT

## 2020-02-26 RX ORDER — FLUTICASONE PROPIONATE 50 MCG
2 SPRAY, SUSPENSION (ML) NASAL DAILY
Qty: 16 G | Refills: 1 | Status: SHIPPED | OUTPATIENT
Start: 2020-02-26

## 2020-02-26 RX ORDER — WARFARIN SODIUM 4 MG/1
4 TABLET ORAL
Qty: 90 TABLET | Refills: 3 | Status: SHIPPED | OUTPATIENT
Start: 2020-02-26

## 2020-02-26 RX ORDER — WARFARIN SODIUM 4 MG/1
4 TABLET ORAL
Qty: 90 TABLET | Refills: 3 | Status: SHIPPED | OUTPATIENT
Start: 2020-02-26 | End: 2020-02-26 | Stop reason: SDUPTHER

## 2020-02-26 NOTE — TELEPHONE ENCOUNTER
Requested medication(s) are due for refill today: Yes  Patient has already received a courtesy refill: No  Other reason request has been forwarded to provider:failure

## 2020-03-04 ENCOUNTER — APPOINTMENT (OUTPATIENT)
Dept: LAB | Facility: CLINIC | Age: 85
End: 2020-03-04
Payer: MEDICARE

## 2020-03-04 ENCOUNTER — TELEPHONE (OUTPATIENT)
Dept: FAMILY MEDICINE CLINIC | Facility: CLINIC | Age: 85
End: 2020-03-04

## 2020-03-04 ENCOUNTER — ANTICOAG VISIT (OUTPATIENT)
Dept: FAMILY MEDICINE CLINIC | Facility: CLINIC | Age: 85
End: 2020-03-04

## 2020-03-04 DIAGNOSIS — I48.0 PAROXYSMAL ATRIAL FIBRILLATION (HCC): ICD-10-CM

## 2020-03-04 LAB
INR PPP: 2.29 (ref 0.84–1.19)
INR PPP: 2.29 (ref 0.84–1.19)
PROTHROMBIN TIME: 24.7 SECONDS (ref 11.6–14.5)

## 2020-03-04 PROCEDURE — 36415 COLL VENOUS BLD VENIPUNCTURE: CPT

## 2020-03-04 PROCEDURE — 85610 PROTHROMBIN TIME: CPT

## 2020-03-04 NOTE — TELEPHONE ENCOUNTER
----- Message from Enrique Elizalde sent at 3/4/2020  1:50 PM EST -----  Continue same dose, recheck 1 week   Enrique Elizalde

## 2020-03-26 ENCOUNTER — TELEPHONE (OUTPATIENT)
Dept: FAMILY MEDICINE CLINIC | Facility: CLINIC | Age: 85
End: 2020-03-26

## 2020-03-26 NOTE — TELEPHONE ENCOUNTER
Emailed  [employer] to confirm and she stated that she did receive it    No further action  Marcia Lobo

## 2020-05-05 ENCOUNTER — TELEPHONE (OUTPATIENT)
Dept: FAMILY MEDICINE CLINIC | Facility: CLINIC | Age: 85
End: 2020-05-05

## 2020-05-06 ENCOUNTER — ANTICOAG VISIT (OUTPATIENT)
Dept: FAMILY MEDICINE CLINIC | Facility: CLINIC | Age: 85
End: 2020-05-06

## 2020-05-06 DIAGNOSIS — I48.0 PAROXYSMAL ATRIAL FIBRILLATION (HCC): ICD-10-CM

## 2020-05-20 DIAGNOSIS — M79.671 PAIN IN BOTH FEET: ICD-10-CM

## 2020-05-20 DIAGNOSIS — M79.672 PAIN IN BOTH FEET: ICD-10-CM

## 2020-05-20 RX ORDER — GABAPENTIN 100 MG/1
CAPSULE ORAL
Qty: 180 CAPSULE | Refills: 1 | OUTPATIENT
Start: 2020-05-20

## 2020-09-23 ENCOUNTER — TELEPHONE (OUTPATIENT)
Dept: RHEUMATOLOGY | Facility: CLINIC | Age: 85
End: 2020-09-23

## 2020-09-24 NOTE — TELEPHONE ENCOUNTER
Please call patient's daughter, Jody Almaguer (number in chart), to see if patient is going to continue follow up with me  It looks like he is in an assisted living facility and maybe they are managing his care there?

## 2021-03-05 NOTE — TELEPHONE ENCOUNTER
Same dose redraw in a month Keystone Flap Text: The defect edges were debeveled with a #15 scalpel blade.  Given the location of the defect, shape of the defect a keystone flap was deemed most appropriate.  Using a sterile surgical marker, an appropriate keystone flap was drawn incorporating the defect, outlining the appropriate donor tissue and placing the expected incisions within the relaxed skin tension lines where possible. The area thus outlined was incised deep to adipose tissue with a #15 scalpel blade.  The skin margins were undermined to an appropriate distance in all directions around the primary defect and laterally outward around the flap utilizing iris scissors.